# Patient Record
Sex: FEMALE | Race: ASIAN | Employment: FULL TIME
[De-identification: names, ages, dates, MRNs, and addresses within clinical notes are randomized per-mention and may not be internally consistent; named-entity substitution may affect disease eponyms.]

---

## 2017-06-01 ENCOUNTER — RECORDS - HEALTHEAST (OUTPATIENT)
Dept: ADMINISTRATIVE | Facility: OTHER | Age: 27
End: 2017-06-01

## 2017-06-03 ENCOUNTER — HOSPITAL ENCOUNTER (OUTPATIENT)
Dept: ULTRASOUND IMAGING | Facility: CLINIC | Age: 27
Discharge: HOME | End: 2017-06-03

## 2017-06-03 DIAGNOSIS — N97.0 FEMALE INFERTILITY ASSOCIATED WITH ANOVULATION: ICD-10-CM

## 2017-06-19 ENCOUNTER — AMBULATORY - HEALTHEAST (OUTPATIENT)
Dept: LAB | Facility: CLINIC | Age: 27
End: 2017-06-19

## 2017-06-19 DIAGNOSIS — O09.01 SUPERVISION OF PREGNANCY WITH HISTORY OF INFERTILITY IN FIRST TRIMESTER: ICD-10-CM

## 2017-06-21 ENCOUNTER — AMBULATORY - HEALTHEAST (OUTPATIENT)
Dept: LAB | Facility: CLINIC | Age: 27
End: 2017-06-21

## 2017-06-21 DIAGNOSIS — O09.01 SUPERVISION OF PREGNANCY WITH HISTORY OF INFERTILITY IN FIRST TRIMESTER: ICD-10-CM

## 2017-07-08 ENCOUNTER — HEALTH MAINTENANCE LETTER (OUTPATIENT)
Age: 27
End: 2017-07-08

## 2018-02-18 ENCOUNTER — HOSPITAL ENCOUNTER (OUTPATIENT)
Dept: MEDSURG UNIT | Facility: CLINIC | Age: 28
Discharge: HOME | End: 2018-02-18
Attending: OBSTETRICS & GYNECOLOGY | Admitting: OBSTETRICS & GYNECOLOGY

## 2018-02-18 ENCOUNTER — RECORDS - HEALTHEAST (OUTPATIENT)
Dept: ADMINISTRATIVE | Facility: OTHER | Age: 28
End: 2018-02-18

## 2018-02-18 ASSESSMENT — MIFFLIN-ST. JEOR: SCORE: 1305.56

## 2018-02-26 ENCOUNTER — ANESTHESIA - HEALTHEAST (OUTPATIENT)
Dept: OBGYN | Facility: CLINIC | Age: 28
End: 2018-02-26

## 2018-02-28 ENCOUNTER — HOME CARE/HOSPICE - HEALTHEAST (OUTPATIENT)
Dept: HOME HEALTH SERVICES | Facility: HOME HEALTH | Age: 28
End: 2018-02-28

## 2018-03-01 ENCOUNTER — COMMUNICATION - HEALTHEAST (OUTPATIENT)
Dept: OBGYN | Facility: CLINIC | Age: 28
End: 2018-03-01

## 2019-08-25 ENCOUNTER — COMMUNICATION - HEALTHEAST (OUTPATIENT)
Dept: SCHEDULING | Facility: CLINIC | Age: 29
End: 2019-08-25

## 2019-08-25 ENCOUNTER — OFFICE VISIT - HEALTHEAST (OUTPATIENT)
Dept: FAMILY MEDICINE | Facility: CLINIC | Age: 29
End: 2019-08-25

## 2019-08-25 DIAGNOSIS — R22.1 THROAT SWELLING: ICD-10-CM

## 2019-08-25 DIAGNOSIS — J06.9 VIRAL URI WITH COUGH: ICD-10-CM

## 2019-08-25 DIAGNOSIS — J02.9 SORE THROAT: ICD-10-CM

## 2019-08-25 LAB — DEPRECATED S PYO AG THROAT QL EIA: NORMAL

## 2019-08-26 LAB — GROUP A STREP BY PCR: NORMAL

## 2019-11-18 ENCOUNTER — RECORDS - HEALTHEAST (OUTPATIENT)
Dept: ADMINISTRATIVE | Facility: OTHER | Age: 29
End: 2019-11-18

## 2019-11-30 ENCOUNTER — HOSPITAL ENCOUNTER (OUTPATIENT)
Dept: ULTRASOUND IMAGING | Facility: HOSPITAL | Age: 29
Discharge: HOME | End: 2019-11-30

## 2019-11-30 DIAGNOSIS — Z31.41 ENCOUNTER FOR FERTILITY TESTING: ICD-10-CM

## 2019-12-06 ENCOUNTER — TRANSFERRED RECORDS (OUTPATIENT)
Dept: HEALTH INFORMATION MANAGEMENT | Facility: CLINIC | Age: 29
End: 2019-12-06

## 2020-02-27 ENCOUNTER — OFFICE VISIT - HEALTHEAST (OUTPATIENT)
Dept: FAMILY MEDICINE | Facility: CLINIC | Age: 30
End: 2020-02-27

## 2020-02-27 ENCOUNTER — COMMUNICATION - HEALTHEAST (OUTPATIENT)
Dept: FAMILY MEDICINE | Facility: CLINIC | Age: 30
End: 2020-02-27

## 2020-02-27 DIAGNOSIS — N89.8 VAGINAL ODOR: ICD-10-CM

## 2020-02-27 DIAGNOSIS — N89.8 VAGINAL DISCHARGE: ICD-10-CM

## 2020-02-27 DIAGNOSIS — N39.0 URINARY TRACT INFECTION WITHOUT HEMATURIA, SITE UNSPECIFIED: ICD-10-CM

## 2020-02-27 DIAGNOSIS — R30.0 DYSURIA: ICD-10-CM

## 2020-02-27 LAB
ALBUMIN UR-MCNC: ABNORMAL MG/DL
APPEARANCE UR: CLEAR
BACTERIA #/AREA URNS HPF: ABNORMAL HPF
BILIRUB UR QL STRIP: NEGATIVE
CLUE CELLS: NORMAL
COLOR UR AUTO: YELLOW
GLUCOSE UR STRIP-MCNC: ABNORMAL MG/DL
HGB UR QL STRIP: ABNORMAL
KETONES UR STRIP-MCNC: NEGATIVE MG/DL
LEUKOCYTE ESTERASE UR QL STRIP: NEGATIVE
NITRATE UR QL: NEGATIVE
PH UR STRIP: 5.5 [PH] (ref 5–8)
RBC #/AREA URNS AUTO: ABNORMAL HPF
SP GR UR STRIP: >=1.03 (ref 1–1.03)
SQUAMOUS #/AREA URNS AUTO: ABNORMAL LPF
TRICHOMONAS, WET PREP: NORMAL
UROBILINOGEN UR STRIP-ACNC: ABNORMAL
WBC #/AREA URNS AUTO: ABNORMAL HPF
YEAST, WET PREP: NORMAL

## 2020-02-28 LAB
C TRACH DNA SPEC QL PROBE+SIG AMP: NEGATIVE
N GONORRHOEA DNA SPEC QL NAA+PROBE: NEGATIVE

## 2020-02-29 LAB — BACTERIA SPEC CULT: ABNORMAL

## 2020-03-03 ENCOUNTER — COMMUNICATION - HEALTHEAST (OUTPATIENT)
Dept: FAMILY MEDICINE | Facility: CLINIC | Age: 30
End: 2020-03-03

## 2020-05-18 ENCOUNTER — OFFICE VISIT - HEALTHEAST (OUTPATIENT)
Dept: FAMILY MEDICINE | Facility: CLINIC | Age: 30
End: 2020-05-18

## 2020-05-18 DIAGNOSIS — N30.00 ACUTE CYSTITIS WITHOUT HEMATURIA: ICD-10-CM

## 2020-05-18 DIAGNOSIS — R35.0 URINARY FREQUENCY: ICD-10-CM

## 2020-05-18 DIAGNOSIS — Z87.440 HISTORY OF BLADDER INFECTIONS: ICD-10-CM

## 2020-05-18 LAB
ALBUMIN UR-MCNC: NEGATIVE MG/DL
APPEARANCE UR: CLEAR
BACTERIA #/AREA URNS HPF: ABNORMAL HPF
BILIRUB UR QL STRIP: NEGATIVE
COLOR UR AUTO: YELLOW
GLUCOSE UR STRIP-MCNC: NEGATIVE MG/DL
HGB UR QL STRIP: ABNORMAL
KETONES UR STRIP-MCNC: NEGATIVE MG/DL
LEUKOCYTE ESTERASE UR QL STRIP: NEGATIVE
NITRATE UR QL: NEGATIVE
PH UR STRIP: 5.5 [PH] (ref 5–8)
RBC #/AREA URNS AUTO: ABNORMAL HPF
SP GR UR STRIP: 1.02 (ref 1–1.03)
SQUAMOUS #/AREA URNS AUTO: ABNORMAL LPF
UROBILINOGEN UR STRIP-ACNC: ABNORMAL
WBC #/AREA URNS AUTO: ABNORMAL HPF

## 2020-05-19 LAB — BACTERIA SPEC CULT: NORMAL

## 2020-06-19 ENCOUNTER — OFFICE VISIT (OUTPATIENT)
Dept: FAMILY MEDICINE | Facility: CLINIC | Age: 30
End: 2020-06-19
Payer: COMMERCIAL

## 2020-06-19 VITALS
RESPIRATION RATE: 16 BRPM | HEIGHT: 58 IN | DIASTOLIC BLOOD PRESSURE: 89 MMHG | HEART RATE: 77 BPM | TEMPERATURE: 98.5 F | SYSTOLIC BLOOD PRESSURE: 128 MMHG | BODY MASS INDEX: 31.49 KG/M2 | WEIGHT: 150 LBS | OXYGEN SATURATION: 99 %

## 2020-06-19 DIAGNOSIS — E55.9 VITAMIN D DEFICIENCY: ICD-10-CM

## 2020-06-19 DIAGNOSIS — K21.9 GASTROESOPHAGEAL REFLUX DISEASE, ESOPHAGITIS PRESENCE NOT SPECIFIED: ICD-10-CM

## 2020-06-19 DIAGNOSIS — E03.9 HYPOTHYROIDISM, UNSPECIFIED TYPE: Primary | ICD-10-CM

## 2020-06-19 RX ORDER — ERGOCALCIFEROL 1.25 MG/1
CAPSULE, LIQUID FILLED ORAL
COMMUNITY
Start: 2020-06-11 | End: 2020-06-19

## 2020-06-19 RX ORDER — CHOLECALCIFEROL (VITAMIN D3) 50 MCG
1 TABLET ORAL DAILY
Qty: 90 TABLET | Refills: 3 | Status: SHIPPED | OUTPATIENT
Start: 2020-06-19 | End: 2021-07-02

## 2020-06-19 RX ORDER — LEVOTHYROXINE SODIUM 112 MCG
112 TABLET ORAL DAILY
Qty: 90 TABLET | Refills: 3 | Status: SHIPPED | OUTPATIENT
Start: 2020-06-19 | End: 2021-02-12

## 2020-06-19 RX ORDER — ONDANSETRON 4 MG/1
4 TABLET, ORALLY DISINTEGRATING ORAL EVERY 8 HOURS PRN
Qty: 20 TABLET | Refills: 0 | Status: SHIPPED | OUTPATIENT
Start: 2020-06-19 | End: 2020-06-29

## 2020-06-19 RX ORDER — LEVOTHYROXINE SODIUM 112 MCG
TABLET ORAL
COMMUNITY
Start: 2020-06-12 | End: 2020-06-19

## 2020-06-19 ASSESSMENT — MIFFLIN-ST. JEOR: SCORE: 1291.28

## 2020-06-19 NOTE — PROGRESS NOTES
HPI:       Laila Powlel is a 29 year old  female with past medical history significant for hypothyroidism, GERD who presents to establish care.     1. Hypothyroidism: Unsure what type of hypothyroidism she has. Has never had her thyroid ultrasounded or biopsied. Her OBGYN was managing her thyroid replacement medications. She states that her TSH was last checked in December 2019 and was normal.     2. Gastirc reflux: She has had issues with reflux symptoms for the last 7 years. She describes burning upper abdomen/mid-chest pain that is partially relieved with Tums. Notes that her symptoms started after her cholecystectomy in 2013. Negative H pylori test in 2015. She has tried diet modifications (cutting down on spicy foods) with some improvement. Currently taking omeprazole 20 mg once daily.     3. Planned plastic surgery: She is planning on having plastic surgery this fall (breast augmentation, liposuction, tummy tuck). Wants to make sure that she has her health issues controlled (hypothyroidism, hypovitaminosis D) prior to her surgery.     4. Contraception: not currently using birth control however states that she is completely done having children. Concerned about weight gain related to birth control. Not interested in committing to a method of contraception today.            History:     Patient Active Problem List   Diagnosis     Health Care Home     Microscopic hematuria     Hypothyroidism     Gastroesophageal reflux disease, esophagitis presence not specified     Vitamin D deficiency     Current Outpatient Medications   Medication Sig Dispense Refill     omeprazole (PRILOSEC) 20 MG DR capsule Take 1 capsule (20 mg) by mouth 2 times daily 180 capsule 3     ondansetron (ZOFRAN-ODT) 4 MG ODT tab Take 1 tablet (4 mg) by mouth every 8 hours as needed for nausea 20 tablet 0     SYNTHROID 112 MCG tablet Take 1 tablet (112 mcg) by mouth daily 90 tablet 3     vitamin D3 (CHOLECALCIFEROL) 50 mcg (2000 units)  "tablet Take 1 tablet (50 mcg) by mouth daily 90 tablet 3     oxyCODONE-acetaminophen (PERCOCET) 5-325 MG per tablet Take 1 tablet by mouth every 4 hours as needed       Social History     Social History Narrative    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       Allergies   Allergen Reactions     Levothyroxine Swelling     Morphine      Nausea          Review of Systems:     10 point ROS neg other than the symptoms noted above in the HPI.          Physical Exam:     Vitals:    06/19/20 0816   BP: 128/89   Pulse: 77   Resp: 16   Temp: 98.5  F (36.9  C)   TempSrc: Oral   SpO2: 99%   Weight: 68 kg (150 lb)   Height: 1.475 m (4' 10.07\")     Body mass index is 31.27 kg/m .    GENERAL APPEARANCE: healthy, alert and no distress,  EYES: Eyes grossly normal to inspection  HENT: nose and mouth without ulcers or lesions  NECK: no adenopathy, no asymmetry, masses, or scars and thyroid normal to palpation without enlargement or palpable nodules  RESP: lungs clear to auscultation - no rales, rhonchi or wheezes  CV: regular rate and rhythm,  and no murmur, click,  rub or gallop  ABDOMEN: soft, nontender, without hepatosplenomegaly or masses  MS: extremities normal- no gross deformities noted  SKIN: no suspicious lesions or rashes  NEURO: Normal strength and tone, sensory exam grossly normal, mentation appears intact and speech normal  PSYCH: mood and affect normal/bright         Assessment and Plan:     Patient is a 29 year old female seen for:    1. Hypothyroidism, unspecified type  Will refill her prescription today. Patient signed NATALIYA for her previous clinic -- I'd like to review the studies they have previously done related to her hypothyroidism and then have patient return for necessary monitoring labs.  - SYNTHROID 112 MCG tablet; Take 1 tablet (112 mcg) by mouth daily  Dispense: 90 tablet; Refill: 3    2. Gastroesophageal reflux disease, esophagitis presence not " specified  Will increase omeprazole to BID to see if that helps better manage her symptoms. Should patient not see improvement, would consider referral to GI for endoscopy. Refill of PRN Zofran requested.   - omeprazole (PRILOSEC) 20 MG DR capsule; Take 1 capsule (20 mg) by mouth 2 times daily  Dispense: 180 capsule; Refill: 3  - ondansetron (ZOFRAN-ODT) 4 MG ODT tab; Take 1 tablet (4 mg) by mouth every 8 hours as needed for nausea  Dispense: 20 tablet; Refill: 0    3. Vitamin D deficiency  Refill requested.   - vitamin D3 (CHOLECALCIFEROL) 50 mcg (2000 units) tablet; Take 1 tablet (50 mcg) by mouth daily  Dispense: 90 tablet; Refill: 3    Options for treatment and follow-up care were reviewed with the patient and/or guardian. Lailavladislav Powell and/or guardian engaged in the decision making process and verbalized understanding of the options discussed and agreed with the final plan.      Noemi Adorno MD  [facility] Resident      Precepted with: Rod Bui MD

## 2020-06-19 NOTE — NURSING NOTE
"Chief Complaint   Patient presents with     Establish Care     re establish care. No concerns today. Will be having surgery in October.      Medication Reconciliation     completed. Omeprazole 20 mg caspule daily-  would like a refill.        /89   Pulse 77   Temp 98.5  F (36.9  C) (Oral)   Resp 16   Ht 1.475 m (4' 10.07\")   Wt 68 kg (150 lb)   LMP 04/01/2020   SpO2 99%   Breastfeeding Unknown   BMI 31.27 kg/m        ~ Rush Monzon CMA ([place])  [facility]  Phone: [phone]    "

## 2020-06-22 NOTE — PROGRESS NOTES
Preceptor Attestation:   Patient seen, evaluated and discussed with the resident. I have verified the content of the note, which accurately reflects my assessment of the patient and the plan of care.    Supervising Physician:Rod Bui MD    [facility]

## 2020-06-23 ENCOUNTER — TELEPHONE (OUTPATIENT)
Dept: FAMILY MEDICINE | Facility: CLINIC | Age: 30
End: 2020-06-23

## 2020-06-23 NOTE — TELEPHONE ENCOUNTER
Prior Authorization needed on:  6/23/20    Medication:  OMEPRAZOLE Dose:  20MG CAPSULES    Pharmacy confirmed as   [id number] - [place], [place] - [address] [place] [address]  Phone: [phone] Fax: [phone]  : Yes    Insurance Name:  [employer]  Insurance Phone: [phone]  Insurance Patient ID: [id number]    Alternatives Suggested:  MAX OF 1 CAP DAILY    SUSAN IMX CMA June 23, 2020 at 9:11 AM

## 2020-06-24 NOTE — TELEPHONE ENCOUNTER
Prior Authorization Approval    Authorization Effective Date: 3/24/2020  Authorization Expiration Date: 10/22/2020  Medication: OMEPRAZOLE 20MG CAP- APPROVED   Approved Dose/Quantity:   Reference #:     Insurance Company: [employer] - Phone [phone] Fax [phone]  Expected CoPay:       CoPay Card Available:      Foundation Assistance Needed:    Which Pharmacy is filling the prescription (Not needed for infusion/clinic administered): [id number] - [place], [place] - [address] AT [facility]  Pharmacy Notified: Yes  Patient Notified: Comment:  **Instructed pharmacy to notify patient when script is ready to /ship.**

## 2020-06-24 NOTE — TELEPHONE ENCOUNTER
Patient with GERD that is refractory to once daily PPI. Part of the standard of care would be a two month trial of twice daily PPI. Will pursue prior authorization for twice daily PPI.    Noemi Adorno MD

## 2020-06-25 NOTE — PROGRESS NOTES
"       HPI:       Liala Powell is a 29 year old  female with a significant past medical history of GERD, hypothyroidism who presents for an administrative encounter to fill out [facility] paperwork. Patient is heading to [place] on 10/4/2020 for her \"mommy makeover\" (diastatsis recti repair, liposuction, tummy tuck, breast augmentation). The surgeon's name is Dr. Mcnair. She is planning on taking 6 weeks for recovery.          History:     Patient Active Problem List   Diagnosis     Health Care Home     Microscopic hematuria     Hypothyroidism     Gastroesophageal reflux disease, esophagitis presence not specified     Vitamin D deficiency     Current Outpatient Medications   Medication Sig Dispense Refill     omeprazole (PRILOSEC) 20 MG DR capsule Take 1 capsule (20 mg) by mouth 2 times daily 180 capsule 3     ondansetron (ZOFRAN-ODT) 4 MG ODT tab Take 1 tablet (4 mg) by mouth every 8 hours as needed for nausea 20 tablet 0     SYNTHROID 112 MCG tablet Take 1 tablet (112 mcg) by mouth daily 90 tablet 3     vitamin D3 (CHOLECALCIFEROL) 50 mcg (2000 units) tablet Take 1 tablet (50 mcg) by mouth daily 90 tablet 3     oxyCODONE-acetaminophen (PERCOCET) 5-325 MG per tablet Take 1 tablet by mouth every 4 hours as needed         Social History     Social History Narrative    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       Allergies   Allergen Reactions     Levothyroxine Swelling     Morphine      Nausea          Review of Systems:     Constitutional, Resp, CV, GI, Psych, Neuro ROS neg other than the symptoms noted above in the HPI.          Physical Exam:     Vitals:    06/26/20 1013   BP: 115/79   Pulse: 77   Resp: 20   Temp: 98.3  F (36.8  C)   TempSrc: Oral   SpO2: 98%   Weight: 68.2 kg (150 lb 6.4 oz)   Height: 1.48 m (4' 10.27\")     Body mass index is 31.15 kg/m .     GENERAL APPEARANCE: healthy, alert and no distress,  EYES: Eyes grossly normal to inspection  RESP: " normal work of breathing on room air, no use of accessory muscles  MS: extremities normal- no gross deformities noted  SKIN: no suspicious lesions or rashes on exposed skin  NEURO: mentation appears intact and speech normal  PSYCH: mood and affect normal/bright         Assessment and Plan:     Patient is a 29 year old female seen for:    1. Administrative encounter  FMLA paperwork completed and work note provided. Will scan a copy into the chart. Discussed that patient will need a pre-op visit within 30 days prior to her procedure and that her employer may require an additional visit to evaluate her workability before returning after leave.     Options for treatment and follow-up care were reviewed with the patient and/or guardian. Laila Powell and/or guardian engaged in the decision making process and verbalized understanding of the options discussed and agreed with the final plan.      Noemi Adorno MD  [facility] Medicine Resident      Precepted with: Brian Lundberg MD

## 2020-06-26 ENCOUNTER — OFFICE VISIT (OUTPATIENT)
Dept: FAMILY MEDICINE | Facility: CLINIC | Age: 30
End: 2020-06-26
Payer: COMMERCIAL

## 2020-06-26 VITALS
DIASTOLIC BLOOD PRESSURE: 79 MMHG | RESPIRATION RATE: 20 BRPM | OXYGEN SATURATION: 98 % | HEIGHT: 58 IN | SYSTOLIC BLOOD PRESSURE: 115 MMHG | TEMPERATURE: 98.3 F | WEIGHT: 150.4 LBS | HEART RATE: 77 BPM | BODY MASS INDEX: 31.57 KG/M2

## 2020-06-26 DIAGNOSIS — Z02.9 ADMINISTRATIVE ENCOUNTER: Primary | ICD-10-CM

## 2020-06-26 ASSESSMENT — MIFFLIN-ST. JEOR: SCORE: 1296.21

## 2020-06-26 NOTE — LETTER
6/26/2020    Re: Laila Powell  1990      To Whom It May Concern:     Laila Powell was seen in clinic today.  She has a planned elective surgery on 10/6/2020. She is requesting 6 weeks of time for post-surgical recovery. She would be able to return to work on 11/16/2020.             Noemi Adorno MD  6/26/2020 10:58 AM

## 2020-06-29 PROBLEM — E66.811 CLASS 1 OBESITY DUE TO EXCESS CALORIES WITHOUT SERIOUS COMORBIDITY WITH BODY MASS INDEX (BMI) OF 31.0 TO 31.9 IN ADULT: Status: ACTIVE | Noted: 2020-06-29

## 2020-06-29 PROBLEM — E66.09 CLASS 1 OBESITY DUE TO EXCESS CALORIES WITHOUT SERIOUS COMORBIDITY WITH BODY MASS INDEX (BMI) OF 31.0 TO 31.9 IN ADULT: Status: ACTIVE | Noted: 2020-06-29

## 2020-06-29 NOTE — PROGRESS NOTES
I have personally reviewed the history and examination as documented by Dr. Prescott.  I was present during key portions of the visit and agree with the assessment and plan as documented for 29 yr old female here for [facility] paperwork for time off after elective surgery. Form filled.    Brian Lunbderg MD  June 29, 2020  4:14 PM

## 2020-09-10 ENCOUNTER — OFFICE VISIT (OUTPATIENT)
Dept: FAMILY MEDICINE | Facility: CLINIC | Age: 30
End: 2020-09-10
Payer: COMMERCIAL

## 2020-09-10 ENCOUNTER — MYC REFILL (OUTPATIENT)
Dept: FAMILY MEDICINE | Facility: CLINIC | Age: 30
End: 2020-09-10

## 2020-09-10 VITALS
TEMPERATURE: 98.5 F | HEART RATE: 88 BPM | DIASTOLIC BLOOD PRESSURE: 77 MMHG | HEIGHT: 58 IN | SYSTOLIC BLOOD PRESSURE: 110 MMHG | WEIGHT: 147.8 LBS | BODY MASS INDEX: 31.02 KG/M2 | OXYGEN SATURATION: 99 %

## 2020-09-10 DIAGNOSIS — E03.9 HYPOTHYROIDISM, UNSPECIFIED TYPE: ICD-10-CM

## 2020-09-10 DIAGNOSIS — K21.9 GASTROESOPHAGEAL REFLUX DISEASE, ESOPHAGITIS PRESENCE NOT SPECIFIED: ICD-10-CM

## 2020-09-10 DIAGNOSIS — Z01.818 PREOP GENERAL PHYSICAL EXAM: Primary | ICD-10-CM

## 2020-09-10 LAB
% GRANULOCYTES: 58.7 %G (ref 40–75)
ALBUMIN SERPL BCP-MCNC: 4.3 G/DL (ref 3.5–5)
ALP SERPL-CCNC: 55 U/L (ref 45–120)
ALT SERPL W/O P-5'-P-CCNC: 44 U/L (ref 0–45)
ANION GAP SERPL CALCULATED.3IONS-SCNC: 11 MMOL/L (ref 5–18)
APTT PPP: 29 SECONDS (ref 24–37)
AST SERPL-CCNC: 40 U/L (ref 0–40)
B-HCG SERPL-ACNC: <2 MLU/ML (ref 0–4)
BILIRUB SERPL-MCNC: 0.4 MG/DL (ref 0–1)
BUN SERPL-MCNC: 13 MG/DL (ref 8–22)
CALCIUM SERPL-MCNC: 9.7 MG/DL (ref 8.5–10.5)
CHLORIDE SERPL-SCNC: 105 MMOL/L (ref 98–107)
CO2 SERPL-SCNC: 23 MMOL/L (ref 22–31)
CREAT SERPL-MCNC: 0.63 MG/DL (ref 0.6–1.1)
GLUCOSE SERPL-MCNC: 79 MG/DL (ref 70–125)
GRANULOCYTES #: 3 K/UL (ref 1.6–8.3)
HCT VFR BLD AUTO: 45.9 % (ref 35–47)
HEMOGLOBIN: 14 G/DL (ref 11.7–15.7)
INR PPP: 0.93 (ref 0.9–1.1)
LYMPHOCYTES # BLD AUTO: 1.8 K/UL (ref 0.8–5.3)
LYMPHOCYTES NFR BLD AUTO: 34.6 %L (ref 20–48)
MCH RBC QN AUTO: 29 PG (ref 26.5–35)
MCHC RBC AUTO-ENTMCNC: 30.5 G/DL (ref 32–36)
MCV RBC AUTO: 95 FL (ref 78–100)
MID #: 0.3 K/UL (ref 0–2.2)
MID %: 6.7 %M (ref 0–20)
PLATELET # BLD AUTO: 343 K/UL (ref 150–450)
POTASSIUM SERPL-SCNC: 4 MMOL/L (ref 3.5–5)
PROT SERPL-MCNC: 7.9 G/DL (ref 6–8)
RBC # BLD AUTO: 4.83 M/UL (ref 3.8–5.2)
SODIUM SERPL-SCNC: 139 MMOL/L (ref 136–145)
TSH SERPL DL<=0.05 MIU/L-ACNC: 0.64 UIU/ML (ref 0.3–5)
WBC # BLD AUTO: 5.1 K/UL (ref 4–11)

## 2020-09-10 RX ORDER — ONDANSETRON 4 MG/1
4 TABLET, ORALLY DISINTEGRATING ORAL EVERY 12 HOURS PRN
Qty: 30 TABLET | Refills: 0 | OUTPATIENT
Start: 2020-09-10

## 2020-09-10 RX ORDER — ONDANSETRON 4 MG/1
4 TABLET, ORALLY DISINTEGRATING ORAL EVERY 8 HOURS PRN
Qty: 30 TABLET | Refills: 3 | Status: SHIPPED | OUTPATIENT
Start: 2020-09-10

## 2020-09-10 RX ORDER — MEDROXYPROGESTERONE ACETATE 10 MG
TABLET ORAL
COMMUNITY
Start: 2020-06-25 | End: 2021-02-12

## 2020-09-10 ASSESSMENT — MIFFLIN-ST. JEOR: SCORE: 1284.42

## 2020-09-10 NOTE — TELEPHONE ENCOUNTER
Yes, noted at 1132am Dr Velez had filled rx. Will call [facility] to ensure they have received rx. Alla RN

## 2020-09-10 NOTE — PROGRESS NOTES
[facility] [address]  [place] [address]  Phone: [phone]  Fax: [phone]  Primary Provider: Noemi Adorno  Pre-op Performing Provider: TRINIDAD COOK    PREOPERATIVE EVALUATION:  Today's date: 9/10/2020    Laila Powell is a 29 year old female who presents for a preoperative evaluation.    Surgical Information:  Surgery Details 9/10/2020   Surgery/Procedure: Tummy tuck, breast   Surgery Location:  Cosmetic   Surgeon: Dr. Mcnair   Surgery Date: 10/6/20   Time of Surgery: unknown   Where patient plans to recover: At home with family   Additional recovery plan details: N/A     Fax number for surgical facility: [phone]  Type of Anesthesia Anticipated: General    Subjective     HPI related to upcoming procedure: 10/6/2020. Breast augmentation and tummy tuck. Plan to have a drain in place after the procedure.   Preop Questions 9/10/2020   1. Have you ever had a heart attack or stroke? No   2. Have you ever had surgery on your heart or blood vessels, such as a stent placement, a coronary artery bypass, or surgery on an artery in your head, neck, heart, or legs? No   3. Do you have chest pain with activity? No   4. Do you have a history of  heart failure? No   5. Do you currently have a cold, bronchitis or symptoms of other infection? No   6. Do you have a cough, shortness of breath, or wheezing? No   7. Do you or anyone in your family have previous history of blood clots? No   8. Do you or does anyone in your family have a serious bleeding problem such as prolonged bleeding following surgeries or cuts? No   9. Have you ever had problems with anemia or been told to take iron pills? YES - After pregnancy   10. Have you had any abnormal blood loss such as black, tarry or bloody stools, or abnormal vaginal bleeding? No   11. Have you ever had a blood transfusion? YES - After  in    11a. Have you ever had a transfusion reaction? No   Are you willing to have a blood transfusion  if it is medically needed before, during, or after your surgery? Yes   13. Have you or any of your relatives ever had problems with anesthesia? No   14. Do you have sleep apnea, excessive snoring or daytime drowsiness? No   15. Do you have any artifical heart valves or other implanted medical devices like a pacemaker, defibrillator, or continuous glucose monitor? No   16. Do you have artificial joints? No   17. Are you allergic to latex? No   18. Is there any chance that you may be pregnant? No     Patient does not have a Health Care Directive or Living Will: Discussed advance care planning with patient; however, patient declined at this time.        Review of Systems  CONSTITUTIONAL: NEGATIVE for fever, chills, change in weight  INTEGUMENTARY/SKIN: NEGATIVE for worrisome rashes, moles or lesions  EYES: NEGATIVE for vision changes or irritation  ENT/MOUTH: NEGATIVE for ear, mouth and throat problems  RESP: NEGATIVE for significant cough or SOB  BREAST: NEGATIVE for masses, tenderness or discharge  CV: NEGATIVE for chest pain, palpitations or peripheral edema  GI: NEGATIVE for nausea, abdominal pain, heartburn, or change in bowel habits  : NEGATIVE for frequency, dysuria, or hematuria  MUSCULOSKELETAL: NEGATIVE for significant arthralgias or myalgia  NEURO: NEGATIVE for weakness, dizziness or paresthesias  ENDOCRINE: NEGATIVE for temperature intolerance, skin/hair changes  HEME: NEGATIVE for bleeding problems  PSYCHIATRIC: NEGATIVE for changes in mood or affect    Patient Active Problem List    Diagnosis Date Noted     Class 1 obesity due to excess calories without serious comorbidity with body mass index (BMI) of 31.0 to 31.9 in adult 06/29/2020     Priority: Medium     Gastroesophageal reflux disease, esophagitis presence not specified 06/19/2020     Priority: Medium     Vitamin D deficiency 06/19/2020     Priority: Medium     Hypothyroidism 11/04/2013     Priority: Medium     Health Care Home 01/08/2013      "Priority: Medium     Tier Level: 0    DX V65.8 REPLACED WITH 06236 HEALTH CARE HOME (04/08/2013)       Microscopic hematuria 01/08/2013     Priority: Medium      Past Medical History:   Diagnosis Date     Anemia 11/4/2013     Headache      Hypothyroidism 11/4/2013     Reflux gastritis      Past Surgical History:   Procedure Laterality Date     CHOLECYSTECTOMY  2013    [facility]     Current Outpatient Medications   Medication Sig Dispense Refill     ondansetron (ZOFRAN-ODT) 4 MG ODT tab Take 1 tablet (4 mg) by mouth every 8 hours as needed for nausea 30 tablet 3     medroxyPROGESTERone (PROVERA) 10 MG tablet        omeprazole (PRILOSEC) 20 MG DR capsule Take 1 capsule (20 mg) by mouth 2 times daily 180 capsule 3     ondansetron (ZOFRAN-ODT) 4 MG ODT tab Take 1 tablet (4 mg) by mouth every 12 hours as needed for nausea 30 tablet 0     SYNTHROID 112 MCG tablet Take 1 tablet (112 mcg) by mouth daily 90 tablet 3     vitamin D3 (CHOLECALCIFEROL) 50 mcg (2000 units) tablet Take 1 tablet (50 mcg) by mouth daily 90 tablet 3       Allergies   Allergen Reactions     Levothyroxine Swelling     Morphine      Nausea        Social History     Tobacco Use     Smoking status: Passive Smoke Exposure - Never Smoker     Smokeless tobacco: Never Used     Tobacco comment:  smokes   Substance Use Topics     Alcohol use: No     Alcohol/week: 0.0 standard drinks     Family History   Problem Relation Age of Onset     Diabetes No family hx of      Coronary Artery Disease No family hx of      Hypertension No family hx of      Breast Cancer No family hx of      Colon Cancer No family hx of      Prostate Cancer No family hx of      Other Cancer No family hx of      History   Drug Use No            Objective   /77   Pulse 88   Temp 98.5  F (36.9  C) (Oral)   Ht 1.48 m (4' 10.27\")   Wt 67 kg (147 lb 12.8 oz)   SpO2 99%   BMI 30.61 kg/m    Physical Exam  GENERAL APPEARANCE: healthy, alert and no " "distress  HENT: ear canals and TM's normal and nose and mouth without ulcers or lesions  RESP: lungs clear to auscultation - no rales, rhonchi or wheezes  CV: regular rate and rhythm, normal S1 S2, no S3 or S4 and no murmur, click or rub   ABDOMEN: soft, nontender, no HSM or masses and bowel sounds normal  NEURO: Normal strength and tone, sensory exam grossly normal, mentation intact and speech normal      PRE-OP Diagnostics:  Labs pending at this time.  Results will be reviewed when available.  EKG required for surgeon request. and not completed in the last 90 days.        EKG Interpretation:      Interpreted by Lyndon Velez MD  Time reviewed:9/10/2020   Symptoms at time of EKG: None   Rhythm: Normal sinus   Rate: Normal  Axis: Normal  Ectopy: None  Conduction: Normal  ST Segments/ T Waves: Non-specific ST-T wave changes and T wave inversion III and V1  Q Waves: None  Comparison to prior: No old EKG available    Clinical Impression: normal EKG         Assessment & Plan   The proposed surgical procedure is considered INTERMEDIATE risk.    REVISED CARDIAC RISK INDEX  The patient has the following serious cardiovascular risks for perioperative complications:  No serious cardiac risks = 0 points    INTERPRETATION: 0 points: Class I (very low risk - 0.4% complication rate)       1. Preop general physical exam  Patient low risk for proposed procedure. No history of issues with anesthesia. History of anemia with her pregnancy that did require transfusion so will check hemoglobin today.   - CBC with Diff Plt ([facility])  - Thyroid Rice ([facility])  - Beta-HCG Quantitative ([facility])  - Comprehensive Metabolic Panel ([facility]) - results <1hr Protocol  - APTT ([facility])  - EKG 12-lead complete w/read - Clinics  - INR ([facility])    2. Hypothyroidism, unspecified type  Previously well controlled but patient states it has been \"awhile\" since TSH last check. Will check TSH and T4 if TSH abnormal.  -TSH    3. " Gastroesophageal reflux disease, esophagitis presence not specified  -Prilosec PRN  - ondansetron (ZOFRAN-ODT) 4 MG ODT tab; Take 1 tablet (4 mg) by mouth every 8 hours as needed for nausea  Dispense: 30 tablet; Refill: 3      The patient has the following additional risks and recommendations for perioperative complications:     - No identified additional risk factors other than previously addressed     MEDICATION INSTRUCTIONS:  Patient is to take all scheduled medications on the day of surgery    RECOMMENDATION:  APPROVAL GIVEN to proceed with proposed procedure, without further diagnostic evaluation.    No follow-ups on file.    Signed Electronically by: Lyndon Velez MD    Copy of this evaluation report is provided to requesting physician.

## 2020-09-10 NOTE — TELEPHONE ENCOUNTER
Giovanny has received rx and filled it for quantity insurance allow for coverage= 21 tablets in 26 days period. Pharmacy to make note and let patient know this information. Alla FRIAS

## 2020-09-10 NOTE — TELEPHONE ENCOUNTER
Reviewed refill request. Looks like this medication was refilled during an office encounter this morning by my partner, Dr. Velez. Please call and inform pharmacy.    Noemi Adorno MD

## 2020-09-10 NOTE — PROGRESS NOTES
[facility] [address]  [place] [address]  Phone: [phone]  Fax: [phone]  Primary Provider: Noemi Adorno  {FV AMB Performing Provider (Optional):792191}    PREOPERATIVE EVALUATION:  Today's date: 9/10/2020    Laila Powell is a 29 year old female who presents for a preoperative evaluation.    Surgical Information:  Surgery Details 9/10/2020   Surgery/Procedure: Tummy tuck, breast   Surgery Location:  Cosmetic   Surgeon: Dr. Mcnair   Surgery Date: 10/6/20   Time of Surgery: unknown   Where patient plans to recover: At home with family   Additional recovery plan details: N/A     Fax number for surgical facility: {SURGERY FAX NUMBER:[id number]}  Type of Anesthesia Anticipated: {ANESTHESIA:651588}    Subjective     HPI related to upcoming procedure: ***  Preop Questions 9/10/2020   1. Have you ever had a heart attack or stroke? No   2. Have you ever had surgery on your heart or blood vessels, such as a stent placement, a coronary artery bypass, or surgery on an artery in your head, neck, heart, or legs? No   3. Do you have chest pain with activity? No   4. Do you have a history of  heart failure? No   5. Do you currently have a cold, bronchitis or symptoms of other infection? No   6. Do you have a cough, shortness of breath, or wheezing? No   7. Do you or anyone in your family have previous history of blood clots? No   8. Do you or does anyone in your family have a serious bleeding problem such as prolonged bleeding following surgeries or cuts? No   9. Have you ever had problems with anemia or been told to take iron pills? YES - ***   10. Have you had any abnormal blood loss such as black, tarry or bloody stools, or abnormal vaginal bleeding? No   11. Have you ever had a blood transfusion? YES - ***   11a. Have you ever had a transfusion reaction? No   Are you willing to have a blood transfusion if it is medically needed before, during, or after your surgery? Yes   13. Have you or any  of your relatives ever had problems with anesthesia? No   14. Do you have sleep apnea, excessive snoring or daytime drowsiness? No   15. Do you have any artifical heart valves or other implanted medical devices like a pacemaker, defibrillator, or continuous glucose monitor? No   16. Do you have artificial joints? No   17. Are you allergic to latex? No   18. Is there any chance that you may be pregnant? No     Patient does not have a Health Care Directive or Living Will: {ADVANCE_DIRECTIVE_STATUS:316390}    RX monitoring program (MNPMP) reviewed: {Mnpmpreport:697164}  {Review MNPMP for all patients per [employer] [email or web address]}  {Chronic problem details (Optional):559105}    Review of Systems  {ROS Preop Choices:640753}    Patient Active Problem List    Diagnosis Date Noted     Class 1 obesity due to excess calories without serious comorbidity with body mass index (BMI) of 31.0 to 31.9 in adult 06/29/2020     Priority: Medium     Gastroesophageal reflux disease, esophagitis presence not specified 06/19/2020     Priority: Medium     Vitamin D deficiency 06/19/2020     Priority: Medium     Hypothyroidism 11/04/2013     Priority: Medium     Health Care Home 01/08/2013     Priority: Medium     Tier Level: 0    DX V65.8 REPLACED WITH 45264 HEALTH CARE HOME (04/08/2013)       Microscopic hematuria 01/08/2013     Priority: Medium      Past Medical History:   Diagnosis Date     Anemia 11/4/2013     Headache      Hypothyroidism 11/4/2013     Reflux gastritis      Past Surgical History:   Procedure Laterality Date     CHOLECYSTECTOMY  2013    On license of [facility]     Current Outpatient Medications   Medication Sig Dispense Refill     omeprazole (PRILOSEC) 20 MG DR capsule Take 1 capsule (20 mg) by mouth 2 times daily 180 capsule 3     ondansetron (ZOFRAN-ODT) 4 MG ODT tab Take 1 tablet (4 mg) by mouth every 12 hours as needed for nausea 30 tablet 0     oxyCODONE-acetaminophen (PERCOCET) 5-325  "MG per tablet Take 1 tablet by mouth every 4 hours as needed       SYNTHROID 112 MCG tablet Take 1 tablet (112 mcg) by mouth daily 90 tablet 3     vitamin D3 (CHOLECALCIFEROL) 50 mcg (2000 units) tablet Take 1 tablet (50 mcg) by mouth daily 90 tablet 3       Allergies   Allergen Reactions     Levothyroxine Swelling     Morphine      Nausea        Social History     Tobacco Use     Smoking status: Passive Smoke Exposure - Never Smoker     Smokeless tobacco: Never Used     Tobacco comment:  smokes   Substance Use Topics     Alcohol use: No     Alcohol/week: 0.0 standard drinks     {FAMILY HISTORY (Optional):266521307}  History   Drug Use No            Objective   /77   Pulse 88   Temp 98.5  F (36.9  C) (Oral)   Ht 1.48 m (4' 10.27\")   Wt 67 kg (147 lb 12.8 oz)   SpO2 99%   BMI 30.61 kg/m    Physical Exam  {EXAM Preop Choices:435626}    No results for input(s): HGB, PLT, INR, NA, POTASSIUM, CR, A1C in the last 59423 hours.     PRE-OP Diagnostics:  {LABS:159947}  {EK}    {Provider  Link to PREOP SmartSet :070226}     Assessment & Plan   The proposed surgical procedure is considered {HIGH=major cardiovascular or procedures requiring prolonged anesthesia >4 hours or large fluid shifts;    INTERMEDIATE=abdominal, most orthopedic and intrathoracic surgery; LOW= endoscopy, cataract and breast surgery:331765} risk.    REVISED CARDIAC RISK INDEX  The patient has the following serious cardiovascular risks for perioperative complications:  {PREOP REVISED CARDIAC RISK INDEX (RCRI):661960::\"No serious cardiac risks = 0 points\"}    INTERPRETATION: {REVISED CARDIAC RISK INTERPRETATION:805894}    {Diag Picklist :020819}    The patient has the following additional risks and recommendations for perioperative complications:    {IMPORTANT - Conditions - complete carefully!!:450896}     MEDICATION INSTRUCTIONS:  {IMPORTANT - Medications:521558}    RECOMMENDATION:  {IMPORTANT - Approval:697542::\"APPROVAL GIVEN " "to proceed with proposed procedure, without further diagnostic evaluation.\"}    No follow-ups on file.    Signed Electronically by: Lyndon Velez MD    Copy of this evaluation report is provided to requesting physician.    [facility] Guidelines    Revised Cardiac Risk Index  "

## 2020-09-10 NOTE — PATIENT INSTRUCTIONS

## 2020-09-15 ENCOUNTER — MYC MEDICAL ADVICE (OUTPATIENT)
Dept: FAMILY MEDICINE | Facility: CLINIC | Age: 30
End: 2020-09-15

## 2020-09-22 ENCOUNTER — OFFICE VISIT (OUTPATIENT)
Dept: FAMILY MEDICINE | Facility: CLINIC | Age: 30
End: 2020-09-22
Payer: COMMERCIAL

## 2020-09-22 VITALS
BODY MASS INDEX: 30.86 KG/M2 | DIASTOLIC BLOOD PRESSURE: 74 MMHG | TEMPERATURE: 98.5 F | WEIGHT: 147 LBS | OXYGEN SATURATION: 98 % | HEART RATE: 78 BPM | HEIGHT: 58 IN | SYSTOLIC BLOOD PRESSURE: 109 MMHG | RESPIRATION RATE: 20 BRPM

## 2020-09-22 DIAGNOSIS — Z01.818 PRE-OP EXAM: Primary | ICD-10-CM

## 2020-09-22 ASSESSMENT — MIFFLIN-ST. JEOR: SCORE: 1278.29

## 2020-09-22 NOTE — NURSING NOTE
"Chief Complaint   Patient presents with     Follow Up     EKG repeat per surgeon.      Medication Reconciliation     completed.        /74 (BP Location: Right arm, Patient Position: Sitting, Cuff Size: Adult Regular)   Pulse 78   Temp 98.5  F (36.9  C) (Oral)   Resp 20   Ht 1.476 m (4' 10.11\")   Wt 66.7 kg (147 lb)   SpO2 98%   BMI 30.61 kg/m      BP Recheck if applicable: NA      ~ Rush Monzon CMA ([place])  [facility]  Phone: [phone]    "

## 2020-09-22 NOTE — PROGRESS NOTES
Preceptor Attestation:   Patient seen, evaluated and discussed with the resident. I personally viewed the EKG and agree with the interpretation documented by the resident. I have verified the content of the note, which accurately reflects my assessment of the patient and the plan of care.  Supervising Physician:Alma Corcoran MD  [facility]

## 2020-09-22 NOTE — PROGRESS NOTES
"  CHIEF COMPLAINT                                                      Chief Complaint   Patient presents with     Follow Up     EKG repeat per surgeon.      Medication Reconciliation     completed.      SUBJECTIVE:                                                    Laila Powell is a 29 year old year old female who presents to clinic today for the following health issues:    Pre-op follow up:  -Was seen by myself on 9/10/2020 for pre-op  -EKG at that time had \"minor inferior depolarization disturbance, consider ischemia\" from computer read. Otherwise unremarkable. Had a wandering baseline so difficult to interpret. Surgeon is requesting a repeat  -Denies history of exertional chest pain  -No lightheadedness or dizziness with exertion  -No family history of early cardiac disease    Patient is an established patient of our clinic.   ----------------------------------------------------------------------------------------------------------------------  Patient Active Problem List   Diagnosis     Health Care Home     Microscopic hematuria     Hypothyroidism     Gastroesophageal reflux disease, esophagitis presence not specified     Vitamin D deficiency     Class 1 obesity due to excess calories without serious comorbidity with body mass index (BMI) of 31.0 to 31.9 in adult     Past Surgical History:   Procedure Laterality Date     CHOLECYSTECTOMY  2013    [facility]       Social History     Tobacco Use     Smoking status: Passive Smoke Exposure - Never Smoker     Smokeless tobacco: Never Used     Tobacco comment:  smokes   Substance Use Topics     Alcohol use: No     Alcohol/week: 0.0 standard drinks     Family History   Problem Relation Age of Onset     Diabetes No family hx of      Coronary Artery Disease No family hx of      Hypertension No family hx of      Breast Cancer No family hx of      Colon Cancer No family hx of      Prostate Cancer No family hx of      Other Cancer No family hx of " "         Problem list and past medical, surgical, social, and family histories reviewed & adjusted, as indicated.    Current Outpatient Medications   Medication Sig Dispense Refill     omeprazole (PRILOSEC) 20 MG DR capsule Take 1 capsule (20 mg) by mouth 2 times daily 180 capsule 3     ondansetron (ZOFRAN-ODT) 4 MG ODT tab Take 1 tablet (4 mg) by mouth every 8 hours as needed for nausea 30 tablet 3     SYNTHROID 112 MCG tablet Take 1 tablet (112 mcg) by mouth daily 90 tablet 3     vitamin D3 (CHOLECALCIFEROL) 50 mcg (2000 units) tablet Take 1 tablet (50 mcg) by mouth daily 90 tablet 3     medroxyPROGESTERone (PROVERA) 10 MG tablet        ondansetron (ZOFRAN-ODT) 4 MG ODT tab Take 1 tablet (4 mg) by mouth every 12 hours as needed for nausea (Patient not taking: Reported on 9/22/2020) 30 tablet 0     Medication list reviewed and updated as indicated.    Allergies   Allergen Reactions     Levothyroxine Swelling     Morphine      Nausea     Allergies reviewed and updated as indicated.  ----------------------------------------------------------------------------------------------------------------------  ROS:     ROS: 10 point ROS neg other than the symptoms noted above in the HPI.    OBJECTIVE:     /74 (BP Location: Right arm, Patient Position: Sitting, Cuff Size: Adult Regular)   Pulse 78   Temp 98.5  F (36.9  C) (Oral)   Resp 20   Ht 1.476 m (4' 10.11\")   Wt 66.7 kg (147 lb)   SpO2 98%   BMI 30.61 kg/m    Body mass index is 30.61 kg/m .  GENERAL: Appears well and in no acute distress.  CARDIOVASCULAR: Regular rate and rhythm, normal S1 and S2 without murmur. No extra heartsounds or friction rub.   RESPIRATORY: Lungs clear to auscultation bilaterally. No wheezing or crackles. No prolonged expiration. Symmetrical chest rise.  MSK: No gross extremity deformities. No peripheral edema. Normal muscle bulk.    Diagnostic Test Results:  EKG - unremarkable    ASSESSMENT/PLAN:     1. Pre-op exam  Repeat EKG today " per her surgeon's request. EKG is of better quality today and shows flipped T waves in V1 and III which given her lack of cardiac symptoms are a normal variant in a 29 year old healthy female. No concerning symptoms of chest pain, shortness of breath, lightheadedness with exertion. No family history of cardiac disease. EKG read as NSR. Cleared for surgery. EKG results will be faxed to surgeon.   - EKG 12-lead complete w/read - Clinics    Schedule follow-up appointment PRN      There are no discontinued medications.    Lyndon Velez MD  [facility]  Pager# [phone]    Precepted with: Alma Corcoran MD    Options for treatment and follow-up care were reviewed with the patient and/or guardian. Laila Powell and/or guardian engaged in the decision making process and verbalized understanding of the options discussed and agreed with the final plan

## 2020-10-28 ENCOUNTER — VIRTUAL VISIT (OUTPATIENT)
Dept: FAMILY MEDICINE | Facility: CLINIC | Age: 30
End: 2020-10-28
Payer: COMMERCIAL

## 2020-10-28 DIAGNOSIS — L50.9 URTICARIA: Primary | ICD-10-CM

## 2020-10-28 PROCEDURE — 99213 OFFICE O/P EST LOW 20 MIN: CPT | Mod: 95 | Performed by: FAMILY MEDICINE

## 2020-10-28 RX ORDER — HYDROXYZINE HYDROCHLORIDE 25 MG/1
25 TABLET, FILM COATED ORAL EVERY 6 HOURS PRN
Qty: 40 TABLET | Refills: 0 | Status: SHIPPED | OUTPATIENT
Start: 2020-10-28 | End: 2020-11-07

## 2020-10-28 RX ORDER — FAMOTIDINE 20 MG/1
20 TABLET, FILM COATED ORAL 2 TIMES DAILY
Qty: 20 TABLET | Refills: 0 | Status: SHIPPED | OUTPATIENT
Start: 2020-10-28 | End: 2020-11-19

## 2020-10-28 NOTE — PROGRESS NOTES
"Family Medicine Video Visit Note  Laila is being evaluated via a billable video visit.               Video Visit Consent     Patient was verbally read the following and verbal consent was obtained.  \"Video visits are billed at different rates depending on your insurance coverage. During this emergency period, for some insurers they may be billed the same as an in-person visit.  Please reach out to your insurance provider with any questions.  If during the course of the call the physician/provider feels a video visit is not appropriate, you will not be charged for this service.\"     (Name person giving consent:  Patient   Date verbal consent given:  10/28/2020  Time verbal consent given:  10:23 AM)    Patient would like the video invitation sent by: URVASHI       Chief Complaint   Patient presents with     Musculoskeletal Problem     Bilateral leg spots, had a procedure done 10/6/20 in [place] and noticed after. Legs inflammed and itchy benadryl is not helping     Medication Reconciliation     Completed                HPI       Video Start Time: 10:31 AM    Laila presents to clinic today for the following health issues:    Rash    Oct 6 - mommy makeover   Breast augmentation, tummy tuck  In [place] -> stayed 10 days including surgery  Prescribed 5 days of pain meds  Was taking Abx for 5-6 days (cephalexin)   Diazepam prn    2-3 days after surgery, noted drainage from tummy tuck   Then noted rash on legs  Not itchy, just spots  Treated w/ benadryl liquid   Improved but never resolved. Then awoke w/ recurrence of same rash x 5 days (maybe worse)  Now is warm, itchy and more diffuse    Location now on thighs, buttocks, and ankle  No fevers. ?chills - \"I'm always cold\"  No family member w/ rash  No nausea/vomiting  No shortness of breath  No dysphagia  Has sensitivity to morphine -> nausea/ vomiting        Current Outpatient Medications   Medication Sig Dispense Refill     famotidine (PEPCID) 20 MG tablet Take 1 tablet (20 " "mg) by mouth 2 times daily for 10 days 20 tablet 0     hydrOXYzine (ATARAX) 25 MG tablet Take 1 tablet (25 mg) by mouth every 6 hours as needed for itching 40 tablet 0     omeprazole (PRILOSEC) 20 MG DR capsule Take 1 capsule (20 mg) by mouth 2 times daily 180 capsule 3     ondansetron (ZOFRAN-ODT) 4 MG ODT tab Take 1 tablet (4 mg) by mouth every 8 hours as needed for nausea 30 tablet 3     SYNTHROID 112 MCG tablet Take 1 tablet (112 mcg) by mouth daily 90 tablet 3     vitamin D3 (CHOLECALCIFEROL) 50 mcg (2000 units) tablet Take 1 tablet (50 mcg) by mouth daily 90 tablet 3     medroxyPROGESTERone (PROVERA) 10 MG tablet        ondansetron (ZOFRAN-ODT) 4 MG ODT tab Take 1 tablet (4 mg) by mouth every 12 hours as needed for nausea (Patient not taking: Reported on 9/22/2020) 30 tablet 0     Allergies   Allergen Reactions     Levothyroxine Swelling     Morphine      Nausea              Review of Systems:     CONSTITUTIONAL: NEGATIVE for fever, chills, change in weight  ENT/MOUTH: NEGATIVE for ear, mouth and throat problems  RESP: NEGATIVE for significant cough or SOB  CV: NEGATIVE for chest pain, palpitations or peripheral edema         Physical Exam:     There were no vitals taken for this visit.  Estimated body mass index is 30.61 kg/m  as calculated from the following:    Height as of 9/22/20: 1.476 m (4' 10.11\").    Weight as of 9/22/20: 66.7 kg (147 lb).    GENERAL: Healthy, alert and no distress  EYES: Eyes grossly normal to inspection.  No discharge or erythema, or obvious scleral/conjunctival abnormalities.  RESP: No audible wheeze, cough, or visible cyanosis.  No visible retractions or increased work of breathing.    SKIN: +urticrial rash w/ erythema and areas of confluence on lower legs, thighs, and buttocks  NEURO: Cranial nerves grossly intact.  Mentation and speech appropriate for age.  PSYCH: Mentation appears normal, affect normal/bright, judgement and insight intact, normal speech and appearance " well-groomed.          Assessment and Plan     (L50.9) Urticaria  (primary encounter diagnosis)  Comment: trial of H1 and H2 blocker; f/u virtual visit in 1 week; will change to in-office visit if no better  Plan: famotidine (PEPCID) 20 MG tablet, hydrOXYzine (ATARAX) 25 MG tablet          Refilled medications that would be required in the next 3 months.     After Visit Information:  Patient chose to view AVS via [employer] in about 1 week (around 11/4/2020) for using a video visit.      Video-Visit Details    Type of service:  Video Visit    Video End Time (time video stopped): 10:47 AM    Originating Location (pt. Location): Home    Distant Location (provider location):  [facility]     Platform used for Video Visit: Joseph Lundberg MD  October 28, 2020  10:47 AM

## 2020-11-08 ENCOUNTER — HEALTH MAINTENANCE LETTER (OUTPATIENT)
Age: 30
End: 2020-11-08

## 2020-11-18 ENCOUNTER — TELEPHONE (OUTPATIENT)
Dept: FAMILY MEDICINE | Facility: CLINIC | Age: 30
End: 2020-11-18

## 2020-11-18 DIAGNOSIS — L50.9 URTICARIA: ICD-10-CM

## 2020-11-19 RX ORDER — FAMOTIDINE 20 MG/1
20 TABLET, FILM COATED ORAL 2 TIMES DAILY
Qty: 28 TABLET | Refills: 0 | Status: SHIPPED | OUTPATIENT
Start: 2020-11-19 | End: 2020-12-08

## 2020-11-24 NOTE — PROGRESS NOTES
"Family Medicine Video Visit Note  Laila is being evaluated via a billable video visit.             Video Visit Consent     Patient was verbally read the following and verbal consent was obtained.  \"Video visits are billed at different rates depending on your insurance coverage. During this emergency period, for some insurers they may be billed the same as an in-person visit.  Please reach out to your insurance provider with any questions.  If during the course of the call the physician/provider feels a video visit is not appropriate, you will not be charged for this service.\"     (Name person giving consent:  Patient   Date verbal consent given:  11/25/2020  Time verbal consent given:  10:48 AM)    Patient would like the video invitation sent by: URVASHI       Chief Complaint   Patient presents with     RECHECK     F/U: Rash on legs pt. reported she took a Rx from what was givem a while ago     Medication Reconciliation     Completed                  HPI       Video Start Time: 11:01 AM    Laila presents to clinic today for the following health issues:    Pt is a 29 year old female last seen on 10/28/20 via virtual visit evaluated for follow-up via virtual visit for:     Rash - treated for urticarial rash  Initially treated w/ H1 and H2 blockers but rash worsened  Then treated w/ 15-day taper of pred   Improved but is still present     Took Nitrofurantoin out of concern for UTI -> noted that her rash improved while on the Abx?   Overall has improved w/ steroids   Rash is not itchy   Now not swollen or itchy but is still present; now looks like dry patches        Current Outpatient Medications   Medication Sig Dispense Refill     omeprazole (PRILOSEC) 20 MG DR capsule Take 1 capsule (20 mg) by mouth 2 times daily 180 capsule 3     ondansetron (ZOFRAN-ODT) 4 MG ODT tab Take 1 tablet (4 mg) by mouth every 8 hours as needed for nausea 30 tablet 3     ondansetron (ZOFRAN-ODT) 4 MG ODT tab Take 1 tablet (4 mg) by mouth every " "12 hours as needed for nausea 30 tablet 0     SYNTHROID 112 MCG tablet Take 1 tablet (112 mcg) by mouth daily 90 tablet 3     vitamin D3 (CHOLECALCIFEROL) 50 mcg (2000 units) tablet Take 1 tablet (50 mcg) by mouth daily 90 tablet 3     famotidine (PEPCID) 20 MG tablet Take 1 tablet (20 mg) by mouth 2 times daily (Patient not taking: Reported on 11/25/2020) 28 tablet 0     medroxyPROGESTERone (PROVERA) 10 MG tablet        Allergies   Allergen Reactions     Levothyroxine Swelling     Morphine      Nausea              Review of Systems:     Constitutional, HEENT, cardiovascular, pulmonary, gi and gu systems are negative, except as otherwise noted.         Physical Exam:     LMP 10/08/2020   Estimated body mass index is 30.61 kg/m  as calculated from the following:    Height as of 9/22/20: 1.476 m (4' 10.11\").    Weight as of 9/22/20: 66.7 kg (147 lb).    GENERAL: Healthy, alert and no distress  EYES: Eyes grossly normal to inspection.  No discharge or erythema, or obvious scleral/conjunctival abnormalities.  RESP: No audible wheeze, cough, or visible cyanosis.  No visible retractions or increased work of breathing.    SKIN:faint erythematous patches on bilateral legs -> improved from last video visit  NEURO: Cranial nerves grossly intact.  Mentation and speech appropriate for age.  PSYCH: Mentation appears normal, affect normal/bright, judgement and insight intact, normal speech and appearance well-groomed.              Assessment and Plan     (L50.9) Urticaria  (primary encounter diagnosis)  Comment:   Plan: I am still unclear if this is the correct diagnosis; I recommended that we do an in-office visit given this has now been a month and I cannot tell over video if the rash has evolved into something else; will see her next week; she will try triamcinolone crm bid to the dry skin patches in the mean time      Refilled medications that would be required in the next 3 months.     After Visit Information:  Patient chose " to view AVS via [employer]      Return in about 8 days (around 12/3/2020) for Follow up, with me, in person.      Video-Visit Details    Type of service:  Video Visit    Video End Time (time video stopped): 11:11 AM    Originating Location (pt. Location): Home    Distant Location (provider location):  [facility]     Platform used for Video Visit: [employer]

## 2020-11-25 ENCOUNTER — VIRTUAL VISIT (OUTPATIENT)
Dept: FAMILY MEDICINE | Facility: CLINIC | Age: 30
End: 2020-11-25
Payer: COMMERCIAL

## 2020-11-25 DIAGNOSIS — L50.9 URTICARIA: Primary | ICD-10-CM

## 2020-11-25 PROCEDURE — 99213 OFFICE O/P EST LOW 20 MIN: CPT | Mod: 95 | Performed by: FAMILY MEDICINE

## 2020-11-25 RX ORDER — FEXOFENADINE HCL 180 MG/1
180 TABLET ORAL DAILY
Qty: 30 TABLET | Refills: 1 | Status: SHIPPED | OUTPATIENT
Start: 2020-11-25 | End: 2021-02-12

## 2020-12-08 DIAGNOSIS — L50.9 URTICARIA: ICD-10-CM

## 2020-12-08 RX ORDER — FAMOTIDINE 20 MG/1
20 TABLET, FILM COATED ORAL 2 TIMES DAILY
Qty: 60 TABLET | Refills: 3 | Status: SHIPPED | OUTPATIENT
Start: 2020-12-08 | End: 2021-02-12

## 2021-01-21 ENCOUNTER — RECORDS - HEALTHEAST (OUTPATIENT)
Dept: ADMINISTRATIVE | Facility: OTHER | Age: 31
End: 2021-01-21

## 2021-02-12 ENCOUNTER — RECORDS - HEALTHEAST (OUTPATIENT)
Dept: ADMINISTRATIVE | Facility: OTHER | Age: 31
End: 2021-02-12

## 2021-02-12 ENCOUNTER — OFFICE VISIT (OUTPATIENT)
Dept: FAMILY MEDICINE | Facility: CLINIC | Age: 31
End: 2021-02-12
Payer: COMMERCIAL

## 2021-02-12 VITALS
OXYGEN SATURATION: 99 % | RESPIRATION RATE: 18 BRPM | SYSTOLIC BLOOD PRESSURE: 112 MMHG | WEIGHT: 144.4 LBS | DIASTOLIC BLOOD PRESSURE: 89 MMHG | BODY MASS INDEX: 30.31 KG/M2 | HEIGHT: 58 IN | HEART RATE: 85 BPM

## 2021-02-12 DIAGNOSIS — R11.0 NAUSEA: ICD-10-CM

## 2021-02-12 DIAGNOSIS — E03.9 HYPOTHYROIDISM, UNSPECIFIED TYPE: ICD-10-CM

## 2021-02-12 DIAGNOSIS — E28.2 PCOS (POLYCYSTIC OVARIAN SYNDROME): ICD-10-CM

## 2021-02-12 DIAGNOSIS — S30.1XXA ABDOMINAL WALL SEROMA, INITIAL ENCOUNTER: Primary | ICD-10-CM

## 2021-02-12 PROCEDURE — 99214 OFFICE O/P EST MOD 30 MIN: CPT | Mod: GC | Performed by: STUDENT IN AN ORGANIZED HEALTH CARE EDUCATION/TRAINING PROGRAM

## 2021-02-12 RX ORDER — LEVOTHYROXINE SODIUM 112 MCG
112 TABLET ORAL DAILY
Qty: 90 TABLET | Refills: 3 | Status: SHIPPED | OUTPATIENT
Start: 2021-02-12 | End: 2021-09-03

## 2021-02-12 RX ORDER — ONDANSETRON 4 MG/1
4 TABLET, ORALLY DISINTEGRATING ORAL EVERY 12 HOURS PRN
Qty: 30 TABLET | Refills: 0 | Status: SHIPPED | OUTPATIENT
Start: 2021-02-12 | End: 2021-06-08

## 2021-02-12 RX ORDER — NORGESTIMATE AND ETHINYL ESTRADIOL 7DAYSX3 28
1 KIT ORAL DAILY
Qty: 28 TABLET | Refills: 3 | Status: SHIPPED | OUTPATIENT
Start: 2021-02-12 | End: 2021-06-08

## 2021-02-12 ASSESSMENT — MIFFLIN-ST. JEOR: SCORE: 1257.74

## 2021-02-12 NOTE — PATIENT INSTRUCTIONS
1. Referral for ultrasound today. Follow up 1 month after your drainage.   2. Refills for zofran and synthroid sent  3. Start taking the Ortho-Tri-Cyclin (oral contraceptive). Let us know if you experience any side effects with this.

## 2021-02-12 NOTE — PROGRESS NOTES
CHIEF COMPLAINT                                                      Chief Complaint   Patient presents with     Refill Request     synthroid, zofran     Referral     referral for procedure injections: [facility] - interventional radiology - [facility] Cazares fax [phone]     SUBJECTIVE:                                                    Laila Powell is a 30 year old year old female who presents to clinic today for the following health issues:    Needs referral:  -Patient had breast augmentation 10/6/2020  -Noticed a bulge in anterior abdomen after the surgery  -Had US done that demonstrated a seroma. Was told she needs to have IR referral to have this drained.   -Not erythematous  -Sometimes painful and will get firm    -Needs refill of synthroid and zofran  -History of PCOS managed with Provera, interested in restarting this or another OCP  -interested in something for contraception  -No unprotected sex since LMP    Patient is an established patient of this clinic.    ----------------------------------------------------------------------------------------------------------------------  Patient Active Problem List   Diagnosis     Health Care Home     Microscopic hematuria     Hypothyroidism     Gastroesophageal reflux disease, esophagitis presence not specified     Vitamin D deficiency     Class 1 obesity due to excess calories without serious comorbidity with body mass index (BMI) of 31.0 to 31.9 in adult     Past Surgical History:   Procedure Laterality Date     ------------OTHER-------------  10/06/2020    BBL - Stomach reconstruction breast augmenatation      CHOLECYSTECTOMY  2013    [facility]       Social History     Tobacco Use     Smoking status: Passive Smoke Exposure - Never Smoker     Smokeless tobacco: Never Used     Tobacco comment:  smokes   Substance Use Topics     Alcohol use: No     Alcohol/week: 0.0 standard drinks     Family History   Problem Relation Age of  "Onset     Diabetes No family hx of      Coronary Artery Disease No family hx of      Hypertension No family hx of      Breast Cancer No family hx of      Colon Cancer No family hx of      Prostate Cancer No family hx of      Other Cancer No family hx of          Problem list and past medical, surgical, social, and family histories reviewed & adjusted, as indicated.    Current Outpatient Medications   Medication Sig Dispense Refill     omeprazole (PRILOSEC) 20 MG DR capsule Take 1 capsule (20 mg) by mouth 2 times daily 180 capsule 3     ondansetron (ZOFRAN-ODT) 4 MG ODT tab Take 1 tablet (4 mg) by mouth every 8 hours as needed for nausea 30 tablet 3     ondansetron (ZOFRAN-ODT) 4 MG ODT tab Take 1 tablet (4 mg) by mouth every 12 hours as needed for nausea 30 tablet 0     SYNTHROID 112 MCG tablet Take 1 tablet (112 mcg) by mouth daily 90 tablet 3     famotidine (PEPCID) 20 MG tablet Take 1 tablet (20 mg) by mouth 2 times daily 60 tablet 3     fexofenadine (ALLEGRA) 180 MG tablet Take 1 tablet (180 mg) by mouth daily 30 tablet 1     medroxyPROGESTERone (PROVERA) 10 MG tablet        vitamin D3 (CHOLECALCIFEROL) 50 mcg (2000 units) tablet Take 1 tablet (50 mcg) by mouth daily 90 tablet 3     Medication list reviewed and updated as indicated.    Allergies   Allergen Reactions     Levothyroxine Swelling     Morphine      Nausea     Allergies reviewed and updated as indicated.  ----------------------------------------------------------------------------------------------------------------------  ROS:     ROS: 10 point ROS neg other than the symptoms noted above in the HPI.    OBJECTIVE:     /89   Pulse 85   Resp 18   Ht 1.47 m (4' 9.87\")   Wt 65.5 kg (144 lb 6.4 oz)   SpO2 99%   BMI 30.31 kg/m    Body mass index is 30.31 kg/m .  GENERAL: Appears well and in no acute distress.  CARDIOVASCULAR: Regular rate and rhythm, normal S1 and S2 without murmur. No extra heartsounds or friction rub.   RESPIRATORY Lungs clear " to auscultation bilaterally. No wheezing or crackles. No prolonged expiration. Symmetrical chest rise.  MSK:  No gross extremity deformities. No peripheral edema. Normal muscle bulk.  ABD: Small soft tissue bulge anterior abdomen, no overlying erythema    Diagnostic Test Results:  none     ASSESSMENT/PLAN:     1. Abdominal wall seroma, initial encounter  Patient with post-operative seroma from tummy tuck procedure. Diagnosed via US. Needs IR referral for drainage. Order for this placed. Will follow up in 1 month after drainage to make sure not recurring.   - US Drainage Seroma/Hematoma Abscess/Cyst; Future    2. Hypothyroidism, unspecified type  Last TSH wnl 9/2020  - SYNTHROID 112 MCG tablet; Take 1 tablet (112 mcg) by mouth daily  Dispense: 90 tablet; Refill: 3    3. PCOS (polycystic ovarian syndrome)  History of PCOS previously managed with provera. Periods are irregular. Looking for something for contraception and weight neutral. Will try ortho-tri-cyclin.  - norgestim-eth estrad triphasic (ORTHO TRI-CYCLEN) 0.18/0.215/0.25 MG-35 MCG tablet; Take 1 tablet by mouth daily  Dispense: 28 tablet; Refill: 3    4. Nausea  Needs refill of zofran.   - ondansetron (ZOFRAN-ODT) 4 MG ODT tab; Take 1 tablet (4 mg) by mouth every 12 hours as needed for nausea  Dispense: 30 tablet; Refill: 0      Schedule follow-up appointment in 1 month(s).      There are no discontinued medications.    Lyndon Velez MD  [facility] Medicine Resident  Pager# [phone]    Precepted with: Dr. Lundberg.     Options for treatment and follow-up care were reviewed with the patient and/or guardian. Laila Poewll and/or guardian engaged in the decision making process and verbalized understanding of the options discussed and agreed with the final plan

## 2021-02-12 NOTE — PROGRESS NOTES
I have personally reviewed the history and examination as documented by Dr. Velez.  I was present during key portions of the visit and agree with the assessment and plan as documented for 30 yr old female w/ abdominal seroma here for follow-up.  Will order for U/S drainage. Contraceptive mgmt reviewed as well. Precautions given. Anticipatory guidance given.     Brian Lundberg MD  February 12, 2021  1:36 PM

## 2021-02-23 ENCOUNTER — AMBULATORY - HEALTHEAST (OUTPATIENT)
Dept: INTERVENTIONAL RADIOLOGY/VASCULAR | Facility: HOSPITAL | Age: 31
End: 2021-02-23

## 2021-02-23 DIAGNOSIS — Z11.59 ENCOUNTER FOR SCREENING FOR OTHER VIRAL DISEASES: ICD-10-CM

## 2021-02-27 ENCOUNTER — AMBULATORY - HEALTHEAST (OUTPATIENT)
Dept: FAMILY MEDICINE | Facility: CLINIC | Age: 31
End: 2021-02-27

## 2021-02-27 DIAGNOSIS — Z11.59 ENCOUNTER FOR SCREENING FOR OTHER VIRAL DISEASES: ICD-10-CM

## 2021-02-28 LAB
SARS-COV-2 PCR COMMENT: NORMAL
SARS-COV-2 RNA SPEC QL NAA+PROBE: NEGATIVE
SARS-COV-2 VIRUS SPECIMEN SOURCE: NORMAL

## 2021-03-01 ENCOUNTER — HOSPITAL ENCOUNTER (OUTPATIENT)
Dept: INTERVENTIONAL RADIOLOGY/VASCULAR | Facility: HOSPITAL | Age: 31
Discharge: HOME | End: 2021-03-01
Admitting: RADIOLOGY

## 2021-03-01 ENCOUNTER — COMMUNICATION - HEALTHEAST (OUTPATIENT)
Dept: SCHEDULING | Facility: CLINIC | Age: 31
End: 2021-03-01

## 2021-03-01 DIAGNOSIS — S30.1XXA ABDOMINAL WALL SEROMA: ICD-10-CM

## 2021-03-01 LAB — HCG UR QL: NEGATIVE

## 2021-03-01 ASSESSMENT — MIFFLIN-ST. JEOR: SCORE: 1255.67

## 2021-03-04 LAB
BACTERIA SPEC CULT: NO GROWTH
GRAM STAIN RESULT: NORMAL
GRAM STAIN RESULT: NORMAL

## 2021-03-05 ENCOUNTER — AMBULATORY - HEALTHEAST (OUTPATIENT)
Dept: INTERVENTIONAL RADIOLOGY/VASCULAR | Facility: HOSPITAL | Age: 31
End: 2021-03-05

## 2021-03-05 ENCOUNTER — HOSPITAL ENCOUNTER (OUTPATIENT)
Dept: INTERVENTIONAL RADIOLOGY/VASCULAR | Facility: HOSPITAL | Age: 31
Discharge: HOME | End: 2021-03-05
Admitting: RADIOLOGY

## 2021-03-05 DIAGNOSIS — L02.91 ABSCESS: ICD-10-CM

## 2021-03-05 DIAGNOSIS — K65.1 ABSCESS OF PERITONEUM (H): ICD-10-CM

## 2021-03-09 ENCOUNTER — HOSPITAL ENCOUNTER (OUTPATIENT)
Dept: INTERVENTIONAL RADIOLOGY/VASCULAR | Facility: HOSPITAL | Age: 31
Discharge: HOME | End: 2021-03-09
Attending: RADIOLOGY

## 2021-03-09 ENCOUNTER — HOSPITAL ENCOUNTER (OUTPATIENT)
Dept: INTERVENTIONAL RADIOLOGY/VASCULAR | Facility: HOSPITAL | Age: 31
Discharge: HOME | End: 2021-03-09

## 2021-03-09 ENCOUNTER — MYC MEDICAL ADVICE (OUTPATIENT)
Dept: FAMILY MEDICINE | Facility: CLINIC | Age: 31
End: 2021-03-09

## 2021-03-09 DIAGNOSIS — E28.2 PCOS (POLYCYSTIC OVARIAN SYNDROME): Primary | ICD-10-CM

## 2021-03-09 DIAGNOSIS — O34.219 PREVIOUS CESAREAN DELIVERY AFFECTING PREGNANCY: ICD-10-CM

## 2021-03-09 DIAGNOSIS — K65.1 ABSCESS OF PERITONEUM (H): ICD-10-CM

## 2021-03-09 LAB — HCG UR QL: NEGATIVE

## 2021-03-09 RX ORDER — MEDROXYPROGESTERONE ACETATE 10 MG
10 TABLET ORAL DAILY
Qty: 28 TABLET | Refills: 3 | Status: SHIPPED | OUTPATIENT
Start: 2021-03-09 | End: 2021-04-30

## 2021-03-09 ASSESSMENT — MIFFLIN-ST. JEOR: SCORE: 1255.67

## 2021-03-09 NOTE — TELEPHONE ENCOUNTER
Sharath Ulloa,   Yes we can reschedule the appointment for this coming Friday since you are still have the drain in place. We can follow this up in a few weeks after you get the drain removed. Sorry you were experiencing side effects with the Ortho-Tri-Cyclin and we can definitely switch back. With switching the contraception and since you stopped taking the other one we do recommend that if you have had unprotected intercourse since your last menstrual period that you check a pregnancy test and then if negative it is OK to start the new medication. If you have not had unprotected intercourse then we just recommend that you use a back up method like a condom for the next week after you start the new medication. I have sent the Provera to the [facility] on [place]. Let us know if you have any side effects with this or if you have any other questions.     Thanks!  Lyndon Velez MD

## 2021-03-11 ENCOUNTER — HOSPITAL ENCOUNTER (OUTPATIENT)
Dept: INTERVENTIONAL RADIOLOGY/VASCULAR | Facility: HOSPITAL | Age: 31
Discharge: HOME | End: 2021-03-11
Attending: RADIOLOGY | Admitting: RADIOLOGY

## 2021-03-11 DIAGNOSIS — O34.219 PREVIOUS CESAREAN DELIVERY AFFECTING PREGNANCY: ICD-10-CM

## 2021-03-11 ASSESSMENT — MIFFLIN-ST. JEOR: SCORE: 1255.67

## 2021-03-19 ENCOUNTER — VIRTUAL VISIT (OUTPATIENT)
Dept: FAMILY MEDICINE | Facility: CLINIC | Age: 31
End: 2021-03-19
Payer: COMMERCIAL

## 2021-03-19 ENCOUNTER — RECORDS - HEALTHEAST (OUTPATIENT)
Dept: ADMINISTRATIVE | Facility: OTHER | Age: 31
End: 2021-03-19

## 2021-03-19 DIAGNOSIS — S30.1XXA ABDOMINAL WALL SEROMA, INITIAL ENCOUNTER: Primary | ICD-10-CM

## 2021-03-19 PROCEDURE — 99213 OFFICE O/P EST LOW 20 MIN: CPT | Mod: 95 | Performed by: STUDENT IN AN ORGANIZED HEALTH CARE EDUCATION/TRAINING PROGRAM

## 2021-03-19 NOTE — PROGRESS NOTES
"Family Medicine Video Visit Note  Laila is being evaluated via a billable video visit.               Video Visit Consent     Patient was verbally read the following and verbal consent was obtained.  \"Video visits are billed at different rates depending on your insurance coverage. During this emergency period, for some insurers they may be billed the same as an in-person visit.  Please reach out to your insurance provider with any questions.  If during the course of the call the physician/provider feels a video visit is not appropriate, you will not be charged for this service.\"     (Name person giving consent:  Patient   Date verbal consent given:  3/19/2021  Time verbal consent given:  11:58 AM)    Patient would like the video invitation sent by: Josee             Chief Complaint   Patient presents with     Throat Problem     Referral                 HPI        Video Start Time: 11:56 AM    Laila presents to clinic today for the following health issues:    Follow Up Seroma:  -patient here for follow up abdominal wall seroma, had drain placed by IR and this was removed 3/11/2021.  -Has since felt like the fluid has returned. Called IR and they wanted repeat imaging.   -Patient states she is OK to go back to surgeon to revise if needed  -Seroma is tense and slightly warm. No increased redness.   -No fevers or chills.         Current Outpatient Medications   Medication Sig Dispense Refill     medroxyPROGESTERone (PROVERA) 10 MG tablet Take 1 tablet (10 mg) by mouth daily 28 tablet 3     norgestim-eth estrad triphasic (ORTHO TRI-CYCLEN) 0.18/0.215/0.25 MG-35 MCG tablet Take 1 tablet by mouth daily 28 tablet 3     omeprazole (PRILOSEC) 20 MG DR capsule Take 1 capsule (20 mg) by mouth 2 times daily 180 capsule 3     ondansetron (ZOFRAN-ODT) 4 MG ODT tab Take 1 tablet (4 mg) by mouth every 12 hours as needed for nausea 30 tablet 0     ondansetron (ZOFRAN-ODT) 4 MG ODT tab Take 1 tablet (4 mg) by mouth every 8 hours as " "needed for nausea 30 tablet 3     SYNTHROID 112 MCG tablet Take 1 tablet (112 mcg) by mouth daily 90 tablet 3     vitamin D3 (CHOLECALCIFEROL) 50 mcg (2000 units) tablet Take 1 tablet (50 mcg) by mouth daily 90 tablet 3     Allergies   Allergen Reactions     Levothyroxine Swelling              Review of Systems:     Constitutional, HEENT, cardiovascular, pulmonary, gi and gu systems are negative, except as otherwise noted.         Physical Exam:     [facility] 03/05/2021   Estimated body mass index is 30.31 kg/m  as calculated from the following:    Height as of 2/12/21: 1.47 m (4' 9.87\").    Weight as of 2/12/21: 65.5 kg (144 lb 6.4 oz).    GENERAL: Healthy, alert and no distress  EYES: Eyes grossly normal to inspection.  No discharge or erythema, or obvious scleral/conjunctival abnormalities.  RESP: No audible wheeze, cough, or visible cyanosis.  No visible retractions or increased work of breathing.    SKIN: Visible skin clear. No significant rash, abnormal pigmentation or lesions.  NEURO: Cranial nerves grossly intact.  Mentation and speech appropriate for age.  PSYCH: Mentation appears normal, affect normal/bright, judgement and insight intact, normal speech and appearance well-groomed.       Results from the last 24 hoursNo results found for this or any previous visit (from the past 24 hour(s)).          Assessment and Plan   1. Abdominal wall seroma, initial encounter  Follow up of abdominal wall seroma. Patient feels like fluid has returned since having the drain removed. Some mild warmth but no redness, fevers, chills to suggest abscess/cellulitis. Called IR and was told needed repeat imaging before getting another drain placed. Will repeat the US and refer to IR if abnormal. Patient has also been in contact with her surgeon from [place]. Patient is willing to go back down to [place] to have a repeat procedure if needed. Will reach out to surgeon after we get the US results.   - US ABDOMEN LIMITED; " Future      Refilled medications that would be required in the next 3 months.     After Visit Information:  Patient declined AVS       No follow-ups on file.      Video-Visit Details    Type of service:  Video Visit    Video End Time (time video stopped): 12:15 PM    Originating Location (pt. Location): Home    Distant Location (provider location):  [facility]     Platform used for Video Visit: Joseph Velez MD  I precepted today with Dr. Bui

## 2021-03-25 ENCOUNTER — RECORDS - HEALTHEAST (OUTPATIENT)
Dept: ADMINISTRATIVE | Facility: OTHER | Age: 31
End: 2021-03-25

## 2021-03-25 ENCOUNTER — TELEPHONE (OUTPATIENT)
Dept: FAMILY MEDICINE | Facility: CLINIC | Age: 31
End: 2021-03-25

## 2021-03-25 ENCOUNTER — RECORDS - HEALTHEAST (OUTPATIENT)
Dept: SCHEDULING | Facility: CLINIC | Age: 31
End: 2021-03-25

## 2021-03-25 ENCOUNTER — ANCILLARY PROCEDURE (OUTPATIENT)
Dept: ULTRASOUND IMAGING | Facility: CLINIC | Age: 31
End: 2021-03-25
Payer: COMMERCIAL

## 2021-03-25 DIAGNOSIS — S30.1XXA ABDOMINAL WALL SEROMA, INITIAL ENCOUNTER: ICD-10-CM

## 2021-03-25 DIAGNOSIS — S30.1XXA ABDOMINAL WALL SEROMA, INITIAL ENCOUNTER: Primary | ICD-10-CM

## 2021-03-25 NOTE — TELEPHONE ENCOUNTER
Called patient to discuss seroma US results. I discussed the case with patient's surgeon from [place] and he would like drain placed to remove fluid again. I fluid < 20 ml does not need to leave drain in. Per his recommendations patient is to have abdominal binder on most of the day. Order sent. Patient OK with IR guided drainage of seroma again, order placed.     1. Abdominal wall seroma, initial encounter  - Elastic Bandages & Supports (ACE ABDOMEN SURGICAL BINDER) MISC; 1 Units daily  Dispense: 1 each; Refill: 0  - US Drainage Seroma/Hematoma Abscess/Cyst; Future      Lyndon Velez MD

## 2021-03-25 NOTE — TELEPHONE ENCOUNTER
Called Debora and needed order updated with surgeon's instruction.     Alexandra can you Fax this telephone encounter and new order over to this number ?    Thanks,  Lyndon Velez MD

## 2021-03-25 NOTE — TELEPHONE ENCOUNTER
Calling to get a more description detail for order that was faxed over, they need to know the aspiration. She states that Dr. Velez should know about this because she spoke to him earlier on the phone. Fax new order to  [phone] and the  is Debora, phone number .

## 2021-03-26 ENCOUNTER — TELEPHONE (OUTPATIENT)
Dept: FAMILY MEDICINE | Facility: CLINIC | Age: 31
End: 2021-03-26

## 2021-03-26 ENCOUNTER — MYC MEDICAL ADVICE (OUTPATIENT)
Dept: FAMILY MEDICINE | Facility: CLINIC | Age: 31
End: 2021-03-26

## 2021-03-26 ENCOUNTER — AMBULATORY - HEALTHEAST (OUTPATIENT)
Dept: INTERVENTIONAL RADIOLOGY/VASCULAR | Facility: HOSPITAL | Age: 31
End: 2021-03-26

## 2021-03-26 DIAGNOSIS — Z11.59 ENCOUNTER FOR SCREENING FOR OTHER VIRAL DISEASES: ICD-10-CM

## 2021-03-26 NOTE — LETTER
3/26/2021         RE: Laila Powell  [address] [place] [address]    To whom it may concern,     Patient was recently seen in clinic. She was seen for a post-operative issue and will continue to have appointments and treatments for this. This will occur once every 2-4 weeks and will likely be ongoing for the next 3 months.       Lyndon Velez MD

## 2021-03-28 ENCOUNTER — AMBULATORY - HEALTHEAST (OUTPATIENT)
Dept: FAMILY MEDICINE | Facility: CLINIC | Age: 31
End: 2021-03-28

## 2021-03-28 DIAGNOSIS — Z11.59 ENCOUNTER FOR SCREENING FOR OTHER VIRAL DISEASES: ICD-10-CM

## 2021-03-30 ENCOUNTER — COMMUNICATION - HEALTHEAST (OUTPATIENT)
Dept: SCHEDULING | Facility: CLINIC | Age: 31
End: 2021-03-30

## 2021-03-30 ENCOUNTER — RECORDS - HEALTHEAST (OUTPATIENT)
Dept: ADMINISTRATIVE | Facility: OTHER | Age: 31
End: 2021-03-30

## 2021-03-31 ENCOUNTER — HOSPITAL ENCOUNTER (OUTPATIENT)
Dept: INTERVENTIONAL RADIOLOGY/VASCULAR | Facility: HOSPITAL | Age: 31
Discharge: HOME | End: 2021-03-31
Admitting: RADIOLOGY

## 2021-03-31 DIAGNOSIS — S30.1XXA ABDOMINAL WALL SEROMA: ICD-10-CM

## 2021-03-31 ASSESSMENT — MIFFLIN-ST. JEOR: SCORE: 1255.67

## 2021-04-05 ENCOUNTER — TELEPHONE (OUTPATIENT)
Dept: FAMILY MEDICINE | Facility: CLINIC | Age: 31
End: 2021-04-05

## 2021-04-05 ENCOUNTER — MYC MEDICAL ADVICE (OUTPATIENT)
Dept: FAMILY MEDICINE | Facility: CLINIC | Age: 31
End: 2021-04-05

## 2021-04-05 DIAGNOSIS — S30.1XXA ABDOMINAL WALL SEROMA, INITIAL ENCOUNTER: Primary | ICD-10-CM

## 2021-04-05 NOTE — TELEPHONE ENCOUNTER
Patient called to schedule an appt but Dr. Velez has nothing soon til 4/26. She would like to leave a message for Dr. Velez as she can't wait for 3 weeks. She states it's the same thing that Dr. Velez had put in order for at her last visit. She states it's for aspiration. Please call and advise.

## 2021-04-05 NOTE — TELEPHONE ENCOUNTER
[facility] Clinic phone call message- general phone call:    Reason for call: Patient called back stating she just spoke with , Patient wants to let  know that her surgeon  is wanting to speak with  about possible treatment plan for patient.  direct number is [phone], ok to text and discuss treatment plan for patient.     Return call needed: Yes    OK to leave a message on voice mail? Yes    Primary language: English      needed? No    Call taken on April 5, 2021 at 4:19 PM by Verónica Powell

## 2021-04-05 NOTE — TELEPHONE ENCOUNTER
Called patient and discussed her symptoms. Initially seen by myself on 2/12/2021 where Seroma first identified. Had IR placed drain on 3/1. Drain removed 3/9. Patient then noticed recurrence of her swelling and repeat US on 3/25 showed recurrence of the seroma. Messaged patient's surgeon and he recommended aspiration if < 20 ml's. Patient then had 8 ml's removed. Patient feels like swelling has recurred. Noticing a hard lump there and some pain. No redness, warmth or fevers to suggest infection. Given the recurrence of the seroma x2 despite drain placement and aspiration will refer patient to local plastic surgeon (her surgeon is located in [place]) for further management.     Discussed with Dr. Reed.     Lyndon Velez MD

## 2021-04-09 NOTE — TELEPHONE ENCOUNTER
Patient contacted through [employer]. See encounter from 4/5/2021.      Lyndon Velez MD

## 2021-04-13 ENCOUNTER — TELEPHONE (OUTPATIENT)
Dept: FAMILY MEDICINE | Facility: CLINIC | Age: 31
End: 2021-04-13

## 2021-04-13 NOTE — TELEPHONE ENCOUNTER
Referral for :     Surgery    LOCATION/PLACE/Provider :    [facility] [place]   DATE & TIME :    April 20th at 10:15 am   PHONE :     [phone]  FAX :    [phone]  Appointment made by clinic staff/:    Aleaxndra    Called and left message for patient, also sent Mychart message in regards to this.

## 2021-04-14 ENCOUNTER — AMBULATORY - HEALTHEAST (OUTPATIENT)
Dept: ADMINISTRATIVE | Facility: CLINIC | Age: 31
End: 2021-04-14

## 2021-04-14 DIAGNOSIS — S30.1XXA ABDOMINAL WALL SEROMA: ICD-10-CM

## 2021-04-20 ENCOUNTER — OFFICE VISIT - HEALTHEAST (OUTPATIENT)
Dept: SURGERY | Facility: CLINIC | Age: 31
End: 2021-04-20

## 2021-04-20 DIAGNOSIS — E66.09 CLASS 1 OBESITY DUE TO EXCESS CALORIES WITHOUT SERIOUS COMORBIDITY WITH BODY MASS INDEX (BMI) OF 31.0 TO 31.9 IN ADULT: ICD-10-CM

## 2021-04-20 DIAGNOSIS — S30.1XXA ABDOMINAL WALL SEROMA, INITIAL ENCOUNTER: ICD-10-CM

## 2021-04-20 DIAGNOSIS — E66.811 CLASS 1 OBESITY DUE TO EXCESS CALORIES WITHOUT SERIOUS COMORBIDITY WITH BODY MASS INDEX (BMI) OF 31.0 TO 31.9 IN ADULT: ICD-10-CM

## 2021-04-20 ASSESSMENT — MIFFLIN-ST. JEOR: SCORE: 1287.42

## 2021-04-21 ENCOUNTER — TELEPHONE (OUTPATIENT)
Dept: FAMILY MEDICINE | Facility: CLINIC | Age: 31
End: 2021-04-21

## 2021-04-21 NOTE — TELEPHONE ENCOUNTER
Acute Sinusitis    Acute sinusitis is irritation and swelling of the sinuses. It is usually caused by a viral infection after a common cold. Your doctor can help you find relief. What is acute sinusitis?   Sinuses are air-filled spaces in the skull behin [facility] phone call message- general phone call:    Reason for call: Patient is to speak to Dr. Velez, she states she went in for her consult and is wondering what the next step is? Patient did schedule an appt with Dr. Velez on 4/30 virtual appt. Told her it could take up to two business days for a response. Please call and advise.     Return call needed: Yes    OK to leave a message on voice mail?     Primary language: English      needed? No    Call taken on April 21, 2021 at 12:23 PM by Maximo Powell   © 7433-1401 [employer]. [address] [facility] [place], [address] [place]. All rights reserved. This information is not intended as a substitute for professional medical care. Always follow your healthcare professional's instructions.         Malcom Alejandre · Stay home until your fever has been gone for at least 24 hours without using medicine to reduce fever. Follow-up care  Follow up with your healthcare provider, or as advised, if you are not getting better over the next week.   If you are age 72 or older,

## 2021-04-22 NOTE — TELEPHONE ENCOUNTER
Called patient and discussed her questions. Patient wondering next steps for seroma and if repeat aspirations would provide benefit. Discussed that since seroma is small and previous aspirations have not provided significant benefit future aspirations likely will not have much benefit. Patient had general surgery consultation for the seroma and they are not recommending surgical intervention at this time and their plan is to give seroma time to heal. Patient interested in starting medication for weight loss and was referred to bariatric program. Would prefer to follow with PCP for this and we will follow this up at her next appointment.    Lyndon Velez MD

## 2021-04-27 ENCOUNTER — VIRTUAL VISIT (OUTPATIENT)
Dept: FAMILY MEDICINE | Facility: CLINIC | Age: 31
End: 2021-04-27
Payer: COMMERCIAL

## 2021-04-27 DIAGNOSIS — N39.0 FREQUENT UTI: ICD-10-CM

## 2021-04-27 DIAGNOSIS — S30.1XXA ABDOMINAL WALL SEROMA, INITIAL ENCOUNTER: ICD-10-CM

## 2021-04-27 DIAGNOSIS — E66.811 CLASS 1 OBESITY WITHOUT SERIOUS COMORBIDITY WITH BODY MASS INDEX (BMI) OF 30.0 TO 30.9 IN ADULT, UNSPECIFIED OBESITY TYPE: ICD-10-CM

## 2021-04-27 DIAGNOSIS — E66.09 CLASS 1 OBESITY DUE TO EXCESS CALORIES WITHOUT SERIOUS COMORBIDITY WITH BODY MASS INDEX (BMI) OF 31.0 TO 31.9 IN ADULT: ICD-10-CM

## 2021-04-27 DIAGNOSIS — E66.811 CLASS 1 OBESITY DUE TO EXCESS CALORIES WITHOUT SERIOUS COMORBIDITY WITH BODY MASS INDEX (BMI) OF 31.0 TO 31.9 IN ADULT: ICD-10-CM

## 2021-04-27 DIAGNOSIS — E28.2 PCOS (POLYCYSTIC OVARIAN SYNDROME): Primary | ICD-10-CM

## 2021-04-27 PROCEDURE — 99214 OFFICE O/P EST MOD 30 MIN: CPT | Mod: 95 | Performed by: STUDENT IN AN ORGANIZED HEALTH CARE EDUCATION/TRAINING PROGRAM

## 2021-04-27 NOTE — PROGRESS NOTES
"Family Medicine Video Visit Note  Laila is being evaluated via a billable video visit.               Video Visit Consent     Patient was verbally read the following and verbal consent was obtained.  \"Video visits are billed at different rates depending on your insurance coverage. During this emergency period, for some insurers they may be billed the same as an in-person visit.  Please reach out to your insurance provider with any questions.  If during the course of the call the physician/provider feels a video visit is not appropriate, you will not be charged for this service.\"     (Name person giving consent:  Patient   Date verbal consent given:  4/27/2021  Time verbal consent given:  4:29 PM)    Patient would like the video invitation sent by: Text to cell phone: [phone]             Chief Complaint   Patient presents with     RECHECK     Medication Reconciliation              HPI       Video Start Time: 4:40 PM    Laila presents to clinic today for the following health issues:    Follow up seroma:  Feels like the seroma is about the same. Some swelling and tenderness persist. Saw the surgeon and recommended watchful waiting.     Weight management:  -interested in losing weight and starting phentermine  -Last BMI in our system 30.31.  -No issues with high blood pressure  -Previously diagnosed with PCOS, unsure if labs were checked  -Eats healthy but feels like she is hungry all the time  -Has decreased the amount of carbs and sweets  -On her feet most of the day.     Frequent UTIs:  Typically will get a UTI whenever she has her period, occurs 4 times per year, no history of bacteria resistance.   -Previously referred to urology for hematuria, they wanted to do cystoscopy but patient declined.     Current Outpatient Medications   Medication Sig Dispense Refill     Elastic Bandages & Supports (ACE ABDOMEN SURGICAL BINDER) MISC 1 Units daily 1 each 0     medroxyPROGESTERone (PROVERA) 10 MG tablet Take 1 tablet (10 mg) " "by mouth daily 28 tablet 3     norgestim-eth estrad triphasic (ORTHO TRI-CYCLEN) 0.18/0.215/0.25 MG-35 MCG tablet Take 1 tablet by mouth daily 28 tablet 3     omeprazole (PRILOSEC) 20 MG DR capsule Take 1 capsule (20 mg) by mouth 2 times daily 180 capsule 3     ondansetron (ZOFRAN-ODT) 4 MG ODT tab Take 1 tablet (4 mg) by mouth every 12 hours as needed for nausea 30 tablet 0     ondansetron (ZOFRAN-ODT) 4 MG ODT tab Take 1 tablet (4 mg) by mouth every 8 hours as needed for nausea 30 tablet 3     SYNTHROID 112 MCG tablet Take 1 tablet (112 mcg) by mouth daily 90 tablet 3     vitamin D3 (CHOLECALCIFEROL) 50 mcg (2000 units) tablet Take 1 tablet (50 mcg) by mouth daily 90 tablet 3     Allergies   Allergen Reactions     Levothyroxine Swelling              Review of Systems:     Constitutional, HEENT, cardiovascular, pulmonary, gi and gu systems are negative, except as otherwise noted.         Physical Exam:     There were no vitals taken for this visit.  Estimated body mass index is 30.31 kg/m  as calculated from the following:    Height as of 2/12/21: 1.47 m (4' 9.87\").    Weight as of 2/12/21: 65.5 kg (144 lb 6.4 oz).    GENERAL: Healthy, alert and no distress  EYES: Eyes grossly normal to inspection.  No discharge or erythema, or obvious scleral/conjunctival abnormalities.  RESP: No audible wheeze, cough, or visible cyanosis.  No visible retractions or increased work of breathing.    SKIN: Visible skin clear. No significant rash, abnormal pigmentation or lesions.  NEURO: Cranial nerves grossly intact.  Mentation and speech appropriate for age.  PSYCH: Mentation appears normal, affect normal/bright, judgement and insight intact, normal speech and appearance well-groomed.      Results from last visit:  Office Visit on 09/10/2020   Component Date Value Ref Range Status     WBC 09/10/2020 5.1  4.0 - 11.0 K/uL Final     Lymphocytes # 09/10/2020 1.8  0.8 - 5.3 K/uL Final     % Lymphocytes 09/10/2020 34.6  20.0 - 48.0 %L " Final     Mid # 09/10/2020 0.3  0.0 - 2.2 K/uL Final     Mid % 09/10/2020 6.7  0.0 - 20.0 %M Final     GRANULOCYTES # 09/10/2020 3.0  1.6 - 8.3 K/uL Final     % Granulocytes 09/10/2020 58.7  40.0 - 75.0 %G Final     RBC 09/10/2020 4.83  3.80 - 5.20 M/uL Final     Hemoglobin 09/10/2020 14.0  11.7 - 15.7 g/dL Final     Hematocrit 09/10/2020 45.9  35.0 - 47.0 % Final     MCV 09/10/2020 95.0  78.0 - 100.0 fL Final     MCH 09/10/2020 29.0  26.5 - 35.0 pg Final     MCHC 09/10/2020 30.5* 32.0 - 36.0 g/dL Final     Platelets 09/10/2020 343.0  150.0 - 450.0 K/uL Final     TSH 09/10/2020 0.64  0.30 - 5.00 uIU/mL Final     Beta hCG, Quantitative 09/10/2020 <2  0 - 4 mlU/mL Final     PTT 09/10/2020 29  24 - 37 seconds Final     INR 09/10/2020 0.93  0.90 - 1.10 Final     Sodium 09/10/2020 139  136 - 145 mmol/L Final     Potassium 09/10/2020 4.0  3.5 - 5.0 mmol/L Final     Chloride 09/10/2020 105  98 - 107 mmol/L Final     CO2, Total 09/10/2020 23  22 - 31 mmol/L Final     Anion Gap 09/10/2020 11  5 - 18 mmol/L Final     Glucose 09/10/2020 79  70 - 125 mg/dL Final     Urea Nitrogen 09/10/2020 13  8 - 22 mg/dL Final     Creatinine 09/10/2020 0.63  0.60 - 1.10 mg/dL Final     GFR Estimate If Black 09/10/2020 >60  >60 mL/min/1.73m2 Final     GFR Estimate 09/10/2020 >60  >60 mL/min/1.73m2 Final     Bilirubin Total 09/10/2020 0.4  0.0 - 1.0 mg/dL Final     Calcium 09/10/2020 9.7  8.5 - 10.5 mg/dL Final     Protein Total 09/10/2020 7.9  6.0 - 8.0 g/dL Final     Albumin 09/10/2020 4.3  3.5 - 5.0 g/dL Final     Alkaline Phosphatase 09/10/2020 55  45 - 120 U/L Final     AST (SGOT) 09/10/2020 40  0 - 40 U/L Final     ALT (SGPT) 09/10/2020 44  0 - 45 U/L Final             Assessment and Plan   1. PCOS (polycystic ovarian syndrome)  History of PCOS and cannot find prior lab work. Will check A1C and lipid panel as patient interested in weight reduction. Recommended metformin today however patient would prefer to start phentermine to induce  weight loss and then maintain this weight loss with metformin.   - Hemoglobin A1c ([facility]); Future  - Lipid Panel ([facility]) - Results < 1 hr; Future    2. Class 1 obesity due to excess calories without serious comorbidity with body mass index (BMI) of 31.0 to 31.9 in adult  Meets criteria for starting phentermine with BMI >30 (BMI last 30.31 here) also with complications of obesity including elevated cholesterol and PCOS. No contraindications including HTN, pregnancy, drug use. We discussed starting this medication as a short term option to induce weight loss and to continue with healthy diet and exercise to maintain this. Will continue this medication for 3 months then switch to metformin or orlistat. Currently on her period so pregnancy unlikely was on provera but patient would like to switch back to ortho-tri cyclen. Has refills at the pharmacy. She will use back up contraception while making the transition to this. Counseled her to take contraception regularly while on phentermine due to risks to fetus.   - phentermine (ADIPEX-P) 15 MG capsule; Take 1 capsule (15 mg) by mouth every morning  Dispense: 30 capsule; Refill: 0  -Follow up in 1 month for weight check.     3. Frequent UTI  Patient with frequent UTI whenever she gets her period. No symptoms currently. Requesting antibiotic to take when she gets symptoms. No culture data in our system and has history of hematuria. Will plan on having patient return for visit if she develops symptoms and treat based on culture data given the recurrence and risk for resistance.     Abdominal wall seroma, initial encounter  Needs paper prescription for surgical binder.   - Elastic Bandages & Supports (ACE ABDOMEN SURGICAL BINDER) MISC; 1 Units daily  Dispense: 1 each; Refill: 0      Refilled medications that would be required in the next 3 months.     After Visit Information:  Patient declined AVS       No follow-ups on file.      Video-Visit Details    Type of service:   Video Visit    Video End Time (time video stopped): 5:10 PM    Originating Location (pt. Location): Home    Distant Location (provider location):  [facility]     Platform used for Video Visit: Joseph Velez MD  I precepted today with Dr. Hernández

## 2021-04-29 DIAGNOSIS — E28.2 PCOS (POLYCYSTIC OVARIAN SYNDROME): ICD-10-CM

## 2021-04-29 DIAGNOSIS — E66.09 CLASS 1 OBESITY DUE TO EXCESS CALORIES WITHOUT SERIOUS COMORBIDITY WITH BODY MASS INDEX (BMI) OF 31.0 TO 31.9 IN ADULT: ICD-10-CM

## 2021-04-29 DIAGNOSIS — E66.811 CLASS 1 OBESITY DUE TO EXCESS CALORIES WITHOUT SERIOUS COMORBIDITY WITH BODY MASS INDEX (BMI) OF 31.0 TO 31.9 IN ADULT: ICD-10-CM

## 2021-04-29 LAB
CHOLEST SERPL-MCNC: 184 MG/DL
CHOLEST/HDLC SERPL: 4.5 RATIO
HBA1C MFR BLD: 5.4 % (ref 4.1–5.7)
HDLC SERPL-MCNC: 41 MG/DL
LDLC SERPL CALC-MCNC: 104 MG/DL (ref 0–99)
TRIGL SERPL-MCNC: 195 MG/DL
TSH SERPL DL<=0.05 MIU/L-ACNC: 1.31 UIU/ML (ref 0.3–5)
VLDL-CHOLESTEROL: 39 MG/DL (ref 7–32)

## 2021-04-29 PROCEDURE — 36415 COLL VENOUS BLD VENIPUNCTURE: CPT

## 2021-04-29 PROCEDURE — 80061 LIPID PANEL: CPT

## 2021-04-29 PROCEDURE — 83036 HEMOGLOBIN GLYCOSYLATED A1C: CPT

## 2021-04-30 RX ORDER — PHENTERMINE HYDROCHLORIDE 15 MG/1
15 CAPSULE ORAL EVERY MORNING
Qty: 30 CAPSULE | Refills: 0 | Status: SHIPPED | OUTPATIENT
Start: 2021-04-30 | End: 2021-05-24

## 2021-04-30 NOTE — RESULT ENCOUNTER NOTE
Called patient and updated on the results. Thyroid level normal. A1C normal. LDL slightly elevated at 104. Discussed initiation of phentermine and prescription for 15 mg sent to pharmacy. Will have patient follow up in 1 month for weight check.     Lyndon Velez MD

## 2021-05-03 NOTE — PROGRESS NOTES
Preceptor Attestation:  Patient's case reviewed and discussed with the resident, Lyndon Velez MD, and I personally evaluated the patient. I agree with written assessment and plan of care.    Supervising Physician:  Joycelyn Hernández MD   [facility]

## 2021-05-24 ENCOUNTER — OFFICE VISIT (OUTPATIENT)
Dept: FAMILY MEDICINE | Facility: CLINIC | Age: 31
End: 2021-05-24
Payer: COMMERCIAL

## 2021-05-24 VITALS
OXYGEN SATURATION: 98 % | RESPIRATION RATE: 20 BRPM | WEIGHT: 143 LBS | SYSTOLIC BLOOD PRESSURE: 130 MMHG | DIASTOLIC BLOOD PRESSURE: 80 MMHG | BODY MASS INDEX: 30.02 KG/M2

## 2021-05-24 DIAGNOSIS — E66.09 CLASS 1 OBESITY DUE TO EXCESS CALORIES WITHOUT SERIOUS COMORBIDITY WITH BODY MASS INDEX (BMI) OF 31.0 TO 31.9 IN ADULT: ICD-10-CM

## 2021-05-24 DIAGNOSIS — E66.811 CLASS 1 OBESITY DUE TO EXCESS CALORIES WITHOUT SERIOUS COMORBIDITY WITH BODY MASS INDEX (BMI) OF 31.0 TO 31.9 IN ADULT: ICD-10-CM

## 2021-05-24 PROCEDURE — 99213 OFFICE O/P EST LOW 20 MIN: CPT | Mod: GC | Performed by: STUDENT IN AN ORGANIZED HEALTH CARE EDUCATION/TRAINING PROGRAM

## 2021-05-24 RX ORDER — PHENTERMINE HYDROCHLORIDE 15 MG/1
15 CAPSULE ORAL EVERY MORNING
Qty: 30 CAPSULE | Refills: 0 | Status: SHIPPED | OUTPATIENT
Start: 2021-05-24 | End: 2021-07-30

## 2021-05-24 RX ORDER — TOPIRAMATE 50 MG/1
50 TABLET, FILM COATED ORAL DAILY
Qty: 30 TABLET | Refills: 0 | Status: SHIPPED | OUTPATIENT
Start: 2021-05-24 | End: 2021-06-08

## 2021-05-24 NOTE — PROGRESS NOTES
ASSESSMENT/PLAN:     Class 1 obesity due to excess calories without serious comorbidity with body mass index (BMI) of 31.0 to 31.9 in adult  Last weight in our system was 144 and today 143. Given the lack of improvement in weight loss will add on Topamax today to help with satiety. Will start low and titrate up at future visits. Discussed dietary changes including intermittent fasting today.   - topiramate (TOPAMAX) 50 MG tablet  Dispense: 30 tablet; Refill: 0  - phentermine (ADIPEX-P) 15 MG capsule  Dispense: 30 capsule; Refill: 0       There are no Patient Instructions on file for this visit.       Schedule follow-up appointment in 1 month(s) or earlier for preop      Medications Discontinued During This Encounter   Medication Reason     phentermine (ADIPEX-P) 15 MG capsule Reorder       Lyndon Velez MD  [facility] Resident  Pager# [phone]    Precepted with: Tuan Reed MD    Options for treatment and follow-up care were reviewed with the patient and/or guardian. Laila Powell and/or guardian engaged in the decision making process and verbalized understanding of the options discussed and agreed with the final plan    CHIEF COMPLAINT                                                      Chief Complaint   Patient presents with     RECHECK     Pre-Op Exam     Tummy tuck      Refill Request     Vitamin D     Medication Reconciliation     Vitamin D once a week      SUBJECTIVE:                                                    Laila Powell is a 30 year old year old female who presents to clinic today for the following health issues:    Weight recheck  -Started on phentermine for weight loss at last visit  -Noticed some decrease in her appetite after breakfast but not around lunch.   -tolerating the medication well, no side effects including : headaches, palpitations  -Will be having a revision of her prior surgery in the coming weeks and will need a preop    Patient is an established patient of this  clinic.    ----------------------------------------------------------------------------------------------------------------------  Patient Active Problem List   Diagnosis     Health Care Home     Microscopic hematuria     Hypothyroidism     Gastroesophageal reflux disease, esophagitis presence not specified     Vitamin D deficiency     Class 1 obesity due to excess calories without serious comorbidity with body mass index (BMI) of 31.0 to 31.9 in adult     Past Surgical History:   Procedure Laterality Date     ------------OTHER-------------  10/06/2020    BBL - Stomach reconstruction breast augmenatation      CHOLECYSTECTOMY  2013    [facility]       Social History     Tobacco Use     Smoking status: Passive Smoke Exposure - Never Smoker     Smokeless tobacco: Never Used     Tobacco comment:  smokes   Substance Use Topics     Alcohol use: No     Alcohol/week: 0.0 standard drinks     Family History   Problem Relation Age of Onset     Diabetes No family hx of      Coronary Artery Disease No family hx of      Hypertension No family hx of      Breast Cancer No family hx of      Colon Cancer No family hx of      Prostate Cancer No family hx of      Other Cancer No family hx of          Problem list and past medical, surgical, social, and family histories reviewed & adjusted, as indicated.    Current Outpatient Medications   Medication Sig Dispense Refill     phentermine (ADIPEX-P) 15 MG capsule Take 1 capsule (15 mg) by mouth every morning 30 capsule 0     SYNTHROID 112 MCG tablet Take 1 tablet (112 mcg) by mouth daily 90 tablet 3     topiramate (TOPAMAX) 50 MG tablet Take 1 tablet (50 mg) by mouth daily 30 tablet 0     vitamin D3 (CHOLECALCIFEROL) 50 mcg (2000 units) tablet Take 1 tablet (50 mcg) by mouth daily 90 tablet 3     Elastic Bandages & Supports (ACE ABDOMEN SURGICAL BINDER) MISC 1 Units daily 1 each 0     norgestim-eth estrad triphasic (ORTHO TRI-CYCLEN) 0.18/0.215/0.25 MG-35 MCG  tablet Take 1 tablet by mouth daily 28 tablet 3     omeprazole (PRILOSEC) 20 MG DR capsule Take 1 capsule (20 mg) by mouth 2 times daily 180 capsule 3     ondansetron (ZOFRAN-ODT) 4 MG ODT tab Take 1 tablet (4 mg) by mouth every 12 hours as needed for nausea 30 tablet 0     ondansetron (ZOFRAN-ODT) 4 MG ODT tab Take 1 tablet (4 mg) by mouth every 8 hours as needed for nausea 30 tablet 3     Medication list reviewed and updated as indicated.    Allergies   Allergen Reactions     Levothyroxine Swelling     Allergies reviewed and updated as indicated.  ----------------------------------------------------------------------------------------------------------------------  ROS:     ROS: 10 point ROS neg other than the symptoms noted above in the HPI.    OBJECTIVE:     /80   Resp 20   Wt 64.9 kg (143 lb)   LMP 04/05/2021   SpO2 98%   BMI 30.02 kg/m    Body mass index is 30.02 kg/m .  GENERAL: Appears well and in no acute distress.  CARDIOVASCULAR: Regular rate and rhythm, normal S1 and S2 without murmur. No extra heartsounds or friction rub.   RESPIRATORY: Lungs clear to auscultation bilaterally. No wheezing or crackles. No prolonged expiration. Symmetrical chest rise.  MSK: No gross extremity deformities. No peripheral edema. Normal muscle bulk.    Diagnostic Test Results:  none     .  Wt Readings from Last 3 Encounters:   05/24/21 64.9 kg (143 lb)   02/12/21 65.5 kg (144 lb 6.4 oz)   09/22/20 66.7 kg (147 lb)

## 2021-05-31 VITALS — BODY MASS INDEX: 30.44 KG/M2 | WEIGHT: 151 LBS | HEIGHT: 59 IN

## 2021-05-31 NOTE — TELEPHONE ENCOUNTER
"\"I have a sore scratchy throat and my neck looks swollen.\"  This started 3 days ago.    Wondering if she has strep throat.    Has a runny nose and that started 3 days ago    Hurts to swallow    Is able to put her chin to her chest and when she does this there feels like there is a ball pushing from the front of the throat pushing into her esophagus     Breathing is fine    Is able to drink to fluids    Has not checked her temp but she states \"I feel a warmness in my head\"    It hurts to cough    Cannot tell if there is any puss on the tonsils    There is a headache present only when she is coughing.    No rash    Is able to open mouth completely    Rates her pain at an 8 out of 10.    Advised that she be seen today.    Options for care given to the patient.    Angélica Donnelly RN   [facility] Medication Refill and Triage Nurse  3:28 PM  8/25/2019      Reason for Disposition    SEVERE (e.g., excruciating) throat pain    Protocols used: SORE THROAT-A-AH      "

## 2021-05-31 NOTE — PATIENT INSTRUCTIONS - HE
Drinks lots of fluids  Alternate tylenol and ibuprofen   Cepacol throat lozenges  Mucinex for congestion

## 2021-05-31 NOTE — PROGRESS NOTES
Subjective:   Laila Powell is a 28 y.o. year old female who presents to clinic today for the following health issues:    Chief Complaint   Patient presents with     swollen glands     headaches, runny nose, coughing, soreness in neck and swollen glands, headaches x 3 days      Patient presents with 3-day history of sore throat, nasal congestion, headaches, and swollen glands.  She is concerned because it is painful and difficult to swallow. She feels as though her throat is swollen. She denies fevers, chills, nausea, vomiting, rash, or difficulty breathing.  She does have history of hypothyroidism and gets her levels checked every 6 months.         PMHX:   PAST MEDICAL HISTORY:  Patient Active Problem List   Diagnosis     Previous  delivery affecting pregnancy     Polyhydramnios in third trimester, antepartum     , delivered     Pregnant       CURRENT MEDICATIONS:  Current Outpatient Medications   Medication Sig Dispense Refill     acetaminophen (TYLENOL) 500 MG tablet Take 500 mg by mouth every 6 (six) hours as needed for pain.       ferrous sulfate 325 (65 FE) MG tablet Take 1 tablet (325 mg total) by mouth 2 (two) times a day with meals.  0     ibuprofen (ADVIL,MOTRIN) 800 MG tablet Take 1 tablet (800 mg total) by mouth every 8 (eight) hours. 45 tablet 0     ranitidine (ZANTAC) 150 MG tablet Take 150 mg by mouth as needed for heartburn.       SYNTHROID 88 mcg tablet Take 80 mcg by mouth once daily.  0     No current facility-administered medications for this visit.        ALLERGIES:  Allergies   Allergen Reactions     Levothyroxine               Objective:     Vitals:    19 1548   BP: 101/68   Pulse: 87   Resp: 14   Temp: 98.8  F (37.1  C)   TempSrc: Oral   SpO2: 99%   Weight: 147 lb 8 oz (66.9 kg)     Body mass index is 29.79 kg/m .    General: No acute distress  HEENT: moist mucous membranes, pupils equal, round, reactive to light and accomodation, patent oropharynx without swelling,  erythematous posterior pharynx without exudates, , tympanic membranes are pearly grey bilaterally, mild erythematous external ear canals  Lymph: Cervical lymphadenopathy, thyroid diffusely enlarged but nontender  Cardiovascular: regular rate and rhythm with no murmur  Pulmonary: clear to auscultation bilaterally with no wheeze  Extremities: warm and well perfused  Skin: warm and dry with no rash  Psych: Appropriate affect, memory intact    Recent Results (from the past 24 hour(s))   Rapid Strep A Screen-Throat   Result Value Ref Range    Rapid Strep A Antigen No Group A Strep detected, presumptive negative No Group A Strep detected, presumptive negative       Assessment and Plan:   1. Viral URI with cough  2. Throat swelling  3 day history of progressive URI symptoms, along with the feeling of pain and difficulty swallowing. Patient was well appearing on exam, breathing comfortably with patent airway but we did get a neck soft tissue xray given her sensation which was unremarkable. Rapid strep was also negative. We discussed symptomatic management including increasing hydration, alternating tylenol and ibuprofen, cepacol throat lozenges and mucinex for congestion. Return precautions were advised.   - Rapid Strep A Screen-Throat  - Group A Strep, RNA Direct Detection, Throat  - XR Neck Soft Tissue       Karissa Quiñones DO

## 2021-06-02 ENCOUNTER — RECORDS - HEALTHEAST (OUTPATIENT)
Dept: ADMINISTRATIVE | Facility: CLINIC | Age: 31
End: 2021-06-02

## 2021-06-02 ENCOUNTER — OFFICE VISIT (OUTPATIENT)
Dept: FAMILY MEDICINE | Facility: CLINIC | Age: 31
End: 2021-06-02
Payer: COMMERCIAL

## 2021-06-02 VITALS
DIASTOLIC BLOOD PRESSURE: 81 MMHG | BODY MASS INDEX: 30.65 KG/M2 | RESPIRATION RATE: 20 BRPM | WEIGHT: 146 LBS | SYSTOLIC BLOOD PRESSURE: 120 MMHG | HEART RATE: 88 BPM | OXYGEN SATURATION: 98 %

## 2021-06-02 DIAGNOSIS — Z01.818 PREOP GENERAL PHYSICAL EXAM: Primary | ICD-10-CM

## 2021-06-02 LAB
ERYTHROCYTE [DISTWIDTH] IN BLOOD BY AUTOMATED COUNT: 11.6 % (ref 10–15)
HCT VFR BLD AUTO: 39.9 % (ref 35–47)
HEMOGLOBIN: 13.7 G/DL (ref 11.7–15.7)
MCH RBC QN AUTO: 29.4 PG (ref 26.5–35)
MCHC RBC AUTO-ENTMCNC: 34.3 G/DL (ref 32–36)
MCV RBC AUTO: 85.6 FL (ref 78–100)
PLATELET # BLD AUTO: 329 10E9/L (ref 150–450)
RBC # BLD AUTO: 4.66 10E12/L (ref 3.8–5.2)
WBC # BLD AUTO: 5.7 10E9/L (ref 4–11)

## 2021-06-02 PROCEDURE — 93000 ELECTROCARDIOGRAM COMPLETE: CPT | Mod: GC | Performed by: STUDENT IN AN ORGANIZED HEALTH CARE EDUCATION/TRAINING PROGRAM

## 2021-06-02 PROCEDURE — 99214 OFFICE O/P EST MOD 30 MIN: CPT | Mod: GC | Performed by: STUDENT IN AN ORGANIZED HEALTH CARE EDUCATION/TRAINING PROGRAM

## 2021-06-02 PROCEDURE — 36415 COLL VENOUS BLD VENIPUNCTURE: CPT | Performed by: STUDENT IN AN ORGANIZED HEALTH CARE EDUCATION/TRAINING PROGRAM

## 2021-06-02 PROCEDURE — 85027 COMPLETE CBC AUTOMATED: CPT | Performed by: STUDENT IN AN ORGANIZED HEALTH CARE EDUCATION/TRAINING PROGRAM

## 2021-06-02 NOTE — Clinical Note
Sharath Burnham,   Can you fax this and CBC/EKG to [facility]. Fax # [phone]

## 2021-06-02 NOTE — PATIENT INSTRUCTIONS

## 2021-06-02 NOTE — PROGRESS NOTES
Preceptor Attestation:   Patient seen, evaluated and discussed with the resident. I personally viewed the EKG and agree with the interpretation documented by the resident. I have verified the content of the note, which accurately reflects my assessment of the patient and the plan of care.  Supervising Physician:Rod Bui MD  [facility]

## 2021-06-03 VITALS — BODY MASS INDEX: 29.79 KG/M2 | WEIGHT: 147.5 LBS

## 2021-06-04 VITALS
OXYGEN SATURATION: 98 % | DIASTOLIC BLOOD PRESSURE: 74 MMHG | RESPIRATION RATE: 16 BRPM | BODY MASS INDEX: 30.32 KG/M2 | HEART RATE: 86 BPM | SYSTOLIC BLOOD PRESSURE: 113 MMHG | TEMPERATURE: 97.9 F | WEIGHT: 150.13 LBS

## 2021-06-04 VITALS
WEIGHT: 151.1 LBS | DIASTOLIC BLOOD PRESSURE: 77 MMHG | TEMPERATURE: 98.3 F | BODY MASS INDEX: 30.52 KG/M2 | HEART RATE: 94 BPM | RESPIRATION RATE: 16 BRPM | SYSTOLIC BLOOD PRESSURE: 118 MMHG | OXYGEN SATURATION: 97 %

## 2021-06-05 VITALS — WEIGHT: 140 LBS | HEIGHT: 59 IN | BODY MASS INDEX: 28.22 KG/M2

## 2021-06-05 VITALS — BODY MASS INDEX: 28.22 KG/M2 | HEIGHT: 59 IN | WEIGHT: 140 LBS

## 2021-06-05 VITALS
WEIGHT: 147 LBS | BODY MASS INDEX: 29.64 KG/M2 | SYSTOLIC BLOOD PRESSURE: 128 MMHG | HEIGHT: 59 IN | DIASTOLIC BLOOD PRESSURE: 70 MMHG

## 2021-06-06 NOTE — TELEPHONE ENCOUNTER
----- Message from Natalie Monique MD sent at 2/28/2020  7:49 PM CST -----  Patient's urine culture positive for E. coli.  Patient was not started on antibiotics at the time of her visit.  I sent over a prescription to [facility] pharmacy for nitrofurantoin.  Please notify patient that this prescription is waiting for her.     Negative for gonorrhea and chlamydia.        Natalie Monique M.D. 2/28/2020 7:48 PM

## 2021-06-06 NOTE — TELEPHONE ENCOUNTER
Call ad left message for pt to return call regarding result. Callback number provided.    BRIDGER Chapman  3/3/2020  11:17 AM

## 2021-06-06 NOTE — TELEPHONE ENCOUNTER
Call and spoke with patient and message was given. All questions answered. Pt will wait to hear back regarding GCC and urine culture.    BRIDGER Chapman  2/27/2020  4:00 PM

## 2021-06-06 NOTE — TELEPHONE ENCOUNTER
Call and spoke with patient and message was given. No further questions.    BRIDGER Chapman  3/5/2020  9:09 AM

## 2021-06-06 NOTE — TELEPHONE ENCOUNTER
----- Message from Virgil Diaz MD sent at 2/27/2020  3:53 PM CST -----  Support staff to please contact patient and relay the following:  -You have tested negative for yeast infection, bacterial vaginosis, and trichomonas.

## 2021-06-06 NOTE — PROGRESS NOTES
[facility] Note                                                        Date of Visit: 2/27/2020     Chief Complaint   Laila Powell is a(n) 29 y.o.  female who presents to [facility] with the following complaint(s):  Dysuria (x1 week, intermittent, patient states she feels this way normally after her menses ends, worse this time.)       Assessment and Plan   1. Dysuria  - Urinalysis-UC if Indicated  - Culture, Urine  - Wet Prep, Vaginal  - Chlamydia trachomatis & Neisseria gonorrhoeae, Amplified Detection    2. Vaginal discharge  - Wet Prep, Vaginal  - Chlamydia trachomatis & Neisseria gonorrhoeae, Amplified Detection    3. Vaginal odor  - Wet Prep, Vaginal  - Chlamydia trachomatis & Neisseria gonorrhoeae, Amplified Detection      Patient presenting with 1-week history of urinary symptoms. Urinalysis is not indicative of a UTI. Self-collected Wet Prep is negative for bacterial vaginosis, trichomonas, and yeast. Gonorrhea / Chlamydia testing and Urine Culture are in process. Appropriate antibiotic therapy will be provided if indicated.     Counseled patient regarding assessment and plan for evaluation and treatment. Questions were answered. See AVS for the specific written instructions and educational handout(s) regarding dysuria that were provided at the conclusion of the visit.     Discussed signs / symptoms that warrant urgent / emergent medical attention.     Follow up with Primary Care in 5 days if symptoms persist.      History of Present Illness   Primary symptom: Urinary symptoms  Onset: 1 week ago  Progression: Worsening  Dysuria: Yes  Polyuria: Yes  Nocturia: Yes  Urgency: Yes, with decreased urine volume  Hematuria: Yes  Suprapubic pain: No  Back / flank pain: No  Vaginal symptoms: Has noted vaginal discharge and odor.   Fevers: No  Chills: No  History of urinary tract infection: Yes, treated by her OB/Gyn at [facility]. Last treated with nitrofurantoin several months ago. Has  been told that she should be seen by Urology due to hematuria and recurrent UTIs.   History of pyelonephritis: No  Menstrual status: First day of LMP was 1/28/2020. Had a negative pregnancy test yesterday. Is using vaginal progesterone for fertility treatments.      Review of Systems   Review of Systems   All other systems reviewed and are negative.       Physical Exam   Vitals:    02/27/20 1355   BP: 118/77   Pulse: 94   Resp: 16   Temp: 98.3  F (36.8  C)   TempSrc: Oral   SpO2: 97%   Weight: 151 lb 1.6 oz (68.5 kg)     Physical Exam  Vitals signs and nursing note reviewed.   Constitutional:       General: She is not in acute distress.     Appearance: She is well-developed and normal weight. She is not ill-appearing or toxic-appearing.   Cardiovascular:      Rate and Rhythm: Normal rate and regular rhythm.      Heart sounds: S1 normal and S2 normal. No murmur. No friction rub. No gallop.    Pulmonary:      Effort: Pulmonary effort is normal.      Breath sounds: Normal breath sounds. No stridor. No wheezing, rhonchi or rales.   Abdominal:      General: Bowel sounds are normal. There is no distension.      Palpations: Abdomen is soft. There is no mass.      Tenderness: There is no abdominal tenderness. There is no right CVA tenderness or left CVA tenderness.   Skin:     General: Skin is warm and dry.      Coloration: Skin is not pale.      Findings: No rash.   Neurological:      General: No focal deficit present.      Mental Status: She is alert and oriented to person, place, and time.          Diagnostic Studies   Laboratory:  Results for orders placed or performed in visit on 02/27/20   Wet Prep, Vaginal    Specimen: Genital   Result Value Ref Range    Yeast Result No yeast seen No yeast seen    Trichomonas No Trichomonas seen No Trichomonas seen    Clue Cells, Wet Prep No Clue cells seen No Clue cells seen   Urinalysis-UC if Indicated   Result Value Ref Range    Color, UA Yellow Colorless, Yellow, Straw, Light  Yellow    Clarity, UA Clear Clear    Glucose,  mg/dL (!) Negative    Bilirubin, UA Negative Negative    Ketones, UA Negative Negative    Specific Gravity, UA >=1.030 1.005 - 1.030    Blood, UA Moderate (!) Negative    pH, UA 5.5 5.0 - 8.0    Protein, UA 30 mg/dL (!) Negative mg/dL    Urobilinogen, UA 0.2 E.U./dL 0.2 E.U./dL, 1.0 E.U./dL    Nitrite, UA Negative Negative    Leukocytes, UA Negative Negative    Bacteria, UA None Seen None Seen hpf    RBC, UA 3-5 (!) None Seen, 0-2 hpf    WBC, UA None Seen None Seen, 0-5 hpf    Squam Epithel, UA None Seen None Seen, 0-5 lpf     Radiology:  N/A  Electrocardiogram:  N/A     Procedure Note   N/A     Pertinent History   The following portions of the patient's history were reviewed and updated as appropriate: allergies, current medications, past family history, past medical history, past social history, past surgical history and problem list.    Patient has Previous  delivery affecting pregnancy; Polyhydramnios in third trimester, antepartum; , delivered; and Pregnant on their problem list.    Patient has a past medical history of Acute cystitis, Anemia, Candidiasis of vulva and vagina, Disease of thyroid gland, Dizziness, Hypothyroidism, Microscopic hematuria, Pharyngitis, and UTI (urinary tract infection).    Patient has a past surgical history that includes Cholecystectomy and  section, low transverse.    Patient's family history includes No Medical Problems in her father and mother.    Patient reports that she has never smoked. She has never used smokeless tobacco. She reports that she does not drink alcohol or use drugs.     Portions of this note have been dictated using voice recognition software. Any grammatical or contextual distortions are unintentional and inherent to the software.    Virgil Diaz MD  [facility], [place]

## 2021-06-08 ENCOUNTER — VIRTUAL VISIT (OUTPATIENT)
Dept: FAMILY MEDICINE | Facility: CLINIC | Age: 31
End: 2021-06-08
Payer: COMMERCIAL

## 2021-06-08 DIAGNOSIS — Z02.89 ENCOUNTER FOR COMPLETION OF FORM WITH PATIENT: Primary | ICD-10-CM

## 2021-06-08 PROCEDURE — 99213 OFFICE O/P EST LOW 20 MIN: CPT | Mod: 95 | Performed by: STUDENT IN AN ORGANIZED HEALTH CARE EDUCATION/TRAINING PROGRAM

## 2021-06-08 NOTE — LETTER
RETURN TO WORK/SCHOOL FORM    6/8/2021    Re: Laila Powell  1990      To Whom It May Concern:     Laila Powell was seen in clinic today.  Due to her medical condition, I recommend the following upcoming work accommodations:  Laila should be excused from work 6/16/2021 - 6/27/2021.  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  Laila may return to normal work hours and normal work duties with no restrictions on 8/16/2021.    Please call our clinic if you have any concerns.    Latrice Grier MD  6/8/2021 11:21 AM

## 2021-06-08 NOTE — Clinical Note
Kim, please make sure the note I have written in the chart is viewable for the patient.  Patient said she will get the letter from [employer].  Thanks!

## 2021-06-08 NOTE — PROGRESS NOTES
"Family Medicine Video Visit Note  Laila is being evaluated via a billable video visit.           Video Visit Consent     Patient was verbally read the following and verbal consent was obtained.  \"Video visits are billed at different rates depending on your insurance coverage. During this emergency period, for some insurers they may be billed the same as an in-person visit.  Please reach out to your insurance provider with any questions.  If during the course of the call the physician/provider feels a video visit is not appropriate, you will not be charged for this service.\"     (Name person giving consent:  Patient   Date verbal consent given:  6/8/2021  Time verbal consent given:  8:44 AM)    Patient would like the video invitation sent by: Send to e-mail at: [email or web address]       Chief Complaint   Patient presents with     Letter for School/Work     restrictions for work- other was not wrriten well- is having surgery letter to say off from 6/16/21 surgery is on 6/17/21 and a week off and there after light office duty work for 5 hours a day from home     Medication Reconciliation     completed               HPI     Video Start Time: 9:02 AM    Laila presents to clinic today for the following health issues:    Requests work note  6/16/21 flying for surgery in [place]  6/17/21 surgery: Revision of tummy tuck for seroma (original surgery in [place])  Previously work letter said requesting 8 weeks off  Requests new work letter    Requests: time off 6/16-6/27, then light 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 4 days a week for duration of 8 weeks  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  Work place has agreed to allow work from home during post-op recovery  In agreement with return to work without restrictions Aug 16th      Current Outpatient Medications   Medication Sig Dispense Refill     omeprazole (PRILOSEC) 20 MG DR capsule Take 1 capsule (20 mg) by mouth 2 times daily " "180 capsule 3     ondansetron (ZOFRAN-ODT) 4 MG ODT tab Take 1 tablet (4 mg) by mouth every 8 hours as needed for nausea 30 tablet 3     phentermine (ADIPEX-P) 15 MG capsule Take 1 capsule (15 mg) by mouth every morning 30 capsule 0     SYNTHROID 112 MCG tablet Take 1 tablet (112 mcg) by mouth daily 90 tablet 3     vitamin D3 (CHOLECALCIFEROL) 50 mcg (2000 units) tablet Take 1 tablet (50 mcg) by mouth daily 90 tablet 3     Elastic Bandages & Supports (ACE ABDOMEN SURGICAL BINDER) MISC 1 Units daily 1 each 0     Allergies   Allergen Reactions     Ciprofloxacin      Levothyroxine Swelling              Review of Systems:     Brief 6-point ROS negative         Physical Exam:     [facility] 05/31/2021   Estimated body mass index is 29.49 kg/m  as calculated from the following:    Height as of 4/20/21: 1.499 m (4' 11\").    Weight as of 6/2/21: 66.2 kg (146 lb).    GENERAL: Healthy, alert and no distress  EYES: Eyes grossly normal to inspection.  No discharge or erythema, or obvious scleral/conjunctival abnormalities.  RESP: No audible wheeze, cough, or visible cyanosis.  No visible retractions or increased work of breathing.    SKIN: Visible skin clear. No significant rash, abnormal pigmentation or lesions.  NEURO: Cranial nerves grossly intact.  Mentation and speech appropriate for age.  PSYCH: Mentation appears normal, affect normal/bright, judgement and insight intact, normal speech and appearance well-groomed.        Assessment and Plan     Laila was seen today for letter for school/work and medication reconciliation.    Diagnoses and all orders for this visit:    Encounter for completion of form with patient    Work note written, in chart; patient will obtain work note through [employer].    Video-Visit Details    Type of service:  Video Visit    Video End Time (time video stopped): 9:12 AM    Originating Location (pt. Location): Home    Distant Location (provider location):  [facility] Platform " used for Video Visit: Joseph Grier MD  I precepted today with He Herman MD    Preceptor Attestation:   Patient seen, evaluated and discussed with the resident. I have verified the content of the note, which accurately reflects my assessment of the patient and the plan of care.  Supervising Physician:He Herman MD  [facility]

## 2021-06-08 NOTE — PROGRESS NOTES
Provider wore a mask and face shield during this visit.     Subjective:   Laila Powell is a 29 y.o. female  Roomed by: Angela SIDHU CMA    Refills needed? No    Do you have any forms that need to be filled out? No      Chief Complaint   Patient presents with     Urinary Frequency     x1wk, urine feels warmer than usual   Admits to 1 week of urinary frequency, urgency, voiding in small amounts, lower pelvic pressure and dysuria, but denies fever, chills or flank pain.  States that in February her initial urinalysis noted that a urine culture was not indicated but a UC was sent anyway. The UC grew > 100,000 of E. Coli and was pansensitive.  Says that she only has a OB/GYN and not a primary care provider.  Patient said that she was sent to see a urologist a couple of years ago but that she chickened out of the visit when she was told that they needed to put a camera into her bladder.  Review of Systems  See HPI for ROS, otherwise balance of other systems negative    Allergies   Allergen Reactions     Levothyroxine        Current Outpatient Medications:      SYNTHROID 88 mcg tablet, Take 80 mcg by mouth once daily., Disp: , Rfl: 0     acetaminophen (TYLENOL) 500 MG tablet, Take 500 mg by mouth every 6 (six) hours as needed for pain., Disp: , Rfl:      ibuprofen (ADVIL,MOTRIN) 800 MG tablet, Take 1 tablet (800 mg total) by mouth every 8 (eight) hours., Disp: 45 tablet, Rfl: 0  Patient Active Problem List   Diagnosis     Previous  delivery affecting pregnancy     Polyhydramnios in third trimester, antepartum     , delivered     Pregnant     Past Medical History:   Diagnosis Date     Acute cystitis      Anemia      Candidiasis of vulva and vagina      Disease of thyroid gland      Dizziness      Hypothyroidism      Microscopic hematuria      Pharyngitis      UTI (urinary tract infection)     - if none on file, see Problem List    Objective:     Vitals:    20 0814   BP: 113/74   Patient Site: Right Arm   Patient  Position: Sitting   Cuff Size: Adult Regular   Pulse: 86   Resp: 16   Temp: 97.9  F (36.6  C)   TempSrc: Oral   SpO2: 98%   Weight: 150 lb 2 oz (68.1 kg)   Gen -Patient in no apparent distress  MS - Negative CVA tenderness bilaterally    Results for orders placed or performed in visit on 05/18/20   Urinalysis-UC if Indicated   Result Value Ref Range    Color, UA Yellow Colorless, Yellow, Straw, Light Yellow    Clarity, UA Clear Clear    Glucose, UA Negative Negative    Bilirubin, UA Negative Negative    Ketones, UA Negative Negative    Specific Gravity, UA 1.025 1.005 - 1.030    Blood, UA Small (!) Negative    pH, UA 5.5 5.0 - 8.0    Protein, UA Negative Negative mg/dL    Urobilinogen, UA 0.2 E.U./dL 0.2 E.U./dL, 1.0 E.U./dL    Nitrite, UA Negative Negative    Leukocytes, UA Negative Negative    Bacteria, UA Few (!) None Seen hpf    RBC, UA 0-2 None Seen, 0-2 hpf    WBC, UA 0-5 None Seen, 0-5 hpf    Squam Epithel, UA 5-10 (!) None Seen, 0-5 lpf   UC not indicated   Lab result discussed on day of visit.     Assessment - Plan   Medical Decision Making -29-year-old woman with a recent history of UTI in February presents with 1 week of urinary frequency, urgency, voiding small amounts, suprapubic pressure and dysuria without fever, chills or flank pain.  In February patient's initial urinalysis did not indicate did not reflex to a urine culture.  Urine culture was sent anyway and it grew greater than 100,000 of E. coli which was pansensitive.  On today's exam she is afebrile and vital signs are stable.  She has no CVA tenderness.   Because of her lack of systemic symptoms acute pyelonephritis is highly unlikely. Will order a urine culture today.  Patient will be treated with nitrofurantoin x 5 days.  Recommended the patient follow-up with the urology group that she was supposed to see a few years ago.  Patient was told that a provider would call her only if an antibiotic change was needed.  See patient instructions.      1. Acute cystitis without hematuria  - nitrofurantoin, macrocrystal-monohydrate, (MACROBID) 100 MG capsule; Take 1 capsule (100 mg total) by mouth 2 (two) times a day for 5 days.  Dispense: 10 capsule; Refill: 0    2. Urinary frequency  - Urinalysis-UC if Indicated    3. History of bladder infections  - Culture, Urine      At the conclusion of the encounter, assessment and plan were discussed.   All questions were answered.   The patient or guardian acknowledged understanding and was involved in the decision making regarding the overall care plan.    There are no Patient Instructions on file for this visit.

## 2021-06-09 ENCOUNTER — TELEPHONE (OUTPATIENT)
Dept: FAMILY MEDICINE | Facility: CLINIC | Age: 31
End: 2021-06-09

## 2021-06-09 ENCOUNTER — MYC MEDICAL ADVICE (OUTPATIENT)
Dept: FAMILY MEDICINE | Facility: CLINIC | Age: 31
End: 2021-06-09

## 2021-06-09 NOTE — TELEPHONE ENCOUNTER
[facility] phone call message- general phone call:    Reason for call: Patient states new work letter is needed. She states her employer is making it more complicated then what it needs to be. She states if letter can states that she needs to be off of work until 8/16/21 because they are expecting her to be back to work sooner than this date. Please call and advise.     Return call needed: Yes    OK to leave a message on voice mail? Yes    Primary language: English      needed? No    Call taken on June 9, 2021 at 11:54 AM by Alba Mccloud

## 2021-06-09 NOTE — LETTER
RETURN TO WORK/SCHOOL FORM    6/9/2021    Re: Laila Powell  1990      To Whom It May Concern:     Laila Powell was seen in clinic 6/8/2021.  Due to her medical condition, she should remain off of work 6/16/2021 - 8/15/2021.  She may return to work without restrictions 8/16/2021.    Latrice Grier MD  6/9/2021 6:17 PM

## 2021-06-09 NOTE — TELEPHONE ENCOUNTER
Called patient to advise of two business days for a response per clinic policy. Patient verbalized understanding and stated she is now requesting to be off work until 08/16/21. Patient asked if a different physician could provide the letter, advised the letter should be from her PCP or  who saw her most recently for this concern. Patient stated her workplace denied previous letter due to it saying she was able to return to regular work duties rather than specify work from home. Patient would now like the letter to only state she is to remain out of work until 08/16/2021. Michael FRIAS

## 2021-06-10 NOTE — TELEPHONE ENCOUNTER
Called left detailed message for Laila, letter is ready for . Will place up front for . Alla FRIAS

## 2021-06-10 NOTE — TELEPHONE ENCOUNTER
Printed and faxed letter to pt provided number. Called pt and informed it was faxed to the number.

## 2021-06-10 NOTE — TELEPHONE ENCOUNTER
Patient called, asked to read what the letter said, read the letter to her, she would like letter to be fax to her work at . She asked when and how long it will take so she can notify her work told her that I don't have an answer for that, we take a message and someone else faxes it so won't be able to give her a time frame of when it will be fax. Please call and advise.

## 2021-06-11 NOTE — PROGRESS NOTES
Called patient to see if she was able to get the letter via [employer]. Patient stated no but someone had faxed it for her already to her work as she called back after her video visit.

## 2021-06-15 NOTE — PROCEDURES
IR Procedure Note    Physician: Bashir Magana MD    Procedure: Abscessogram and sclerotherapy treatment.    Findings/Plan:  Abscessogram shows small residual fluid cavity in the abdominal wall.  Total of 5 ml of ethanal alcohol was instilled for sclerotherapy of the pocket.     OK to discharge in 1 hour.  Patient is to follow up early  next week with possible additional sclerotherapy treatment.

## 2021-06-15 NOTE — PROCEDURES
[facility]    Procedure: Abdominal drain placement    Date/Time: 3/1/2021 10:51 AM  Performed by: Sigifredo Kay MD  Authorized by: Sigifredo Kay MD       Universal Protocol    Site marked: NA    Prior images obtained and reviewed: Yes    Required items: required blood products, implants, devices, and special equipment available    Patient identity confirmed: verbally with patient, arm band, provided demographic data and hospital-assigned identification number    Reevaluation: NA - No sedation, light sedation, or local anesthesia    Confirmation checklist: patient's identity using two indicators, relevant allergies, procedure was appropriate and matched the consent or emergent situation and correct equipment/implants were available    Time out: Immediately prior to procedure a time out was called to verify the correct patient, procedure, equipment, support staff and site/side marked as required    Universal Protocol: Joint Commission Universal Protocol was followed    Preparation: Patient was prepped and draped in the usual sterile fashion    ESBL (mL): 5    Anesthesia    Local anesthesia used?: Yes    Anesthesia: local infiltration    Local anesthetic: lidocaine 1% without epinephrine    Anesthetic total (mL): 5    Sedation  Patient sedation: No    Post-procedure    Description of procedure: Thin, pancake shaped collection in the supraumbicial region.  No fluid deep to the lower incision.  Serosanginous fluid aspirated (approx 15 mL total).  Placement of a 8 Fr pigtail drain.    Patient tolerance: patient tolerated the procedure well with no immediate complications   Length of time physician present for 1:1 monitoring during sedation: 0

## 2021-06-15 NOTE — PRE-PROCEDURE
Procedure Name: abscess drain check  Date/Time: 3/11/2021 8:50 AM    Verbal consent obtained?: Yes  Written consent obtained?: Yes  Risks and benefits: Risks, benefits and alternatives were discussed  Consent given by: patient  ASA Class: Class 1- healthy patient  Mallampati: Grade 2- soft palate, base of uvula, tonsillar pillars, and portion of posterior pharyngeal wall visible  Patient states understanding of procedure being performed: Yes  Patient's understanding of procedure matches consent: Yes  Procedure consent matches procedure scheduled: Yes  Appropriately NPO: yes  Lungs: lungs clear with good breath sounds bilaterally  Heart: normal heart sounds and rate  History & Physical reviewed: History and physical reviewed and no updates needed  Statement of review: I have reviewed the lab findings, diagnostic data, medications, and the plan for sedation

## 2021-06-15 NOTE — PROCEDURES
Interventional Radiology Post-Procedure Note   [facility]  ?   Brief Procedure Note:   Patient name: Laila Powell  Pt MRN:[id number]   Date of procedure: 3/9/2021     Procedure(s): Abscessogram and sclerotherapy  Sedation method: Moderate sedation was employed. The patient was monitored by an interventional radiology nurse at all times throughout the procedure under my direct guidance.  Pre Procedure Diagnosis: Abdominal seroma  Post Procedure Diagnosis: Same  Indications: Symptomatic seroma, follow up drain placement   ?   Attending: Marlon Garcia M.D.  Specimen(s) removed: None   Additional studies ordered: None  Drains: 8 Fr pigtail drain  Estimated Blood Loss: Minimal  Complications: None  Vascular closure method: N/A    Findings/Notes/Comments: Residual seroma still seen, volume of 6 ml. Sclerotherapy performed with 6cc of dehydrated ethanol. No flushing. Return to IR department on 3/11/21 for follow up.   ?   Please see dictation in PACS or under the Imaging tab in [employer] for detailed procedure note.     Marlon Garcia M.D.   Vascular and Interventional Radiology   Pager: [phone]   After Hours / Scheduling: [phone]     3/9/2021  8:26 AM

## 2021-06-15 NOTE — PROCEDURES
[facility]    Procedure: IR Procedure Note    Date/Time: 3/9/2021 8:28 AM  Performed by: Marlon Garcia MD  Authorized by: Marlon Garcia MD       Universal Protocol    Site marked: NA    Prior images obtained and reviewed: Yes    Required items: required blood products, implants, devices, and special equipment available    Patient identity confirmed: verbally with patient, arm band, provided demographic data and hospital-assigned identification number    Reevaluation: NA - No sedation, light sedation, or local anesthesia    Confirmation checklist: patient's identity using two indicators, relevant allergies, procedure was appropriate and matched the consent or emergent situation and correct equipment/implants were available    Time out: Immediately prior to procedure a time out was called to verify the correct patient, procedure, equipment, support staff and site/side marked as required    Universal Protocol: Joint Commission Universal Protocol was followed    Preparation: Patient was prepped and draped in the usual sterile fashion    Anesthesia  Local anesthesia used?: No    Sedation  Patient sedation: No  Specimens: none  Complications: None  Condition: Stable    Post-procedure   Length of time physician present for 1:1 monitoring during sedation: 0

## 2021-06-15 NOTE — PRE-PROCEDURE
Procedure Name: drain placement   Date/Time: 3/1/2021 9:45 AM  Written consent obtained?: Yes  Risks and benefits: Risks, benefits and alternatives were discussed  Consent given by: patient  Expected level of sedation: moderate  ASA Class: Class 2- mild systemic disease, no acute problems, no functional limitations  Mallampati: Grade 3- soft palate visible, posterior pharyngeal wall not visible  Patient states understanding of procedure being performed: Yes  Patient's understanding of procedure matches consent: Yes  Procedure consent matches procedure scheduled: Yes  Appropriately NPO: yes  Lungs: lungs clear with good breath sounds bilaterally  Heart: normal heart sounds and rate  History & Physical reviewed: Abbreviated history and physical done prior to moderate sedation  Statement of review: I have reviewed the lab findings, diagnostic data, medications, and the plan for sedation

## 2021-06-16 NOTE — PROGRESS NOTES
"Patient [place] [facility] [place] and \"loss of 2 mucous plugs.\" Patient brought to triage room, placed FHT monitor and [place]. Vital signs obtained. Patient stated contractions started around 1500 at home and are 5 minutes apart.  "

## 2021-06-16 NOTE — ANESTHESIA PROCEDURE NOTES
Epidural Block    Patient location during procedure: OB  Time Called: 2/26/2018 11:16 AM  Reason for Block:labor epidural  Staffing:  Performing  Anesthesiologist: JAYDEN CHAVEZ  Preanesthetic Checklist  Completed: patient identified, risks, benefits, and alternatives discussed, timeout performed, consent obtained, at patient's request, airway assessed, oxygen available, suction available, emergency drugs available and hand hygiene performed  Procedure  Patient position: sitting  Prep: ChloraPrep and site prepped and draped  Patient monitoring: continuous pulse oximetry, heart rate and blood pressure  Approach: midline  Location: L3-L4  Injection technique: ROSA ELENA saline  Number of Attempts:1  Needle  Needle type: Oracio   Needle gauge: 18 G     Catheter in Space: 5  Assessment  Sensory level:  No complications

## 2021-06-16 NOTE — PROGRESS NOTES
Dr. Cardenas returned call. Discussed findings to her and she gave orders to watch the patient for 1 hour, have her walk and recheck her cervix after 1 hour. Discussed plan with on-call resident, Dr. Mendoza, and he is agreeable. Will discuss plan with patient. Judy Machado 02/18/18 6:30 PM

## 2021-06-16 NOTE — ANESTHESIA POSTPROCEDURE EVALUATION
Patient: Laila Powell  * No procedures listed *  Anesthesia type: No value filed.    Patient location: Labor and Delivery  Last vitals:   Vitals:    02/26/18 1915   BP: 97/54   Pulse: (!) 105   Resp:    Temp:    SpO2:      Post vital signs: stable  Level of consciousness: awake and responds to simple questions  Post-anesthesia pain: pain controlled  Post-anesthesia nausea and vomiting: no  Pulmonary: unassisted, return to baseline  Cardiovascular: stable and blood pressure at baseline  Hydration: adequate  Anesthetic events: no    QCDR Measures:  ASA# 11 - [place]-op Cardiac Arrest: ASA11B - Patient did NOT experience unanticipated cardiac arrest  ASA# 12 - [place]anna-op Mortality Rate: ASA12B - Patient did NOT die  ASA# 13 - PACU Re-Intubation Rate: ASA13B - Patient did NOT require a new airway mgmt  ASA# 10 - Composite Anes Safety: ASA10A - No serious adverse event    Additional Notes:

## 2021-06-16 NOTE — PROGRESS NOTES
Call returned for Dr. Cradenas. Discussed patients wishes and she is agreeable to to send the patient home. Will discuss with patient.

## 2021-06-16 NOTE — PRE-PROCEDURE
Date/Time: 3/31/2021 8:17 AM  Written consent obtained?: Yes  Risks and benefits: Risks, benefits and alternatives were discussed  Consent given by: patient  Expected level of sedation: moderate  ASA Class: Class 2- mild systemic disease, no acute problems, no functional limitations  Mallampati: Grade 1- soft palate, uvula, tonsillar pillars, and posterior pharyngeal wall visible and Grade 2- soft palate, base of uvula, tonsillar pillars, and portion of posterior pharyngeal wall visible  Patient states understanding of procedure being performed: Yes  Patient's understanding of procedure matches consent: Yes  Appropriately NPO: yes  Lungs: lungs clear with good breath sounds bilaterally  Heart: normal heart sounds and rate  History & Physical reviewed: History and physical reviewed and no updates needed  Statement of review: I have reviewed the lab findings, diagnostic data, medications, and the plan for sedation

## 2021-06-16 NOTE — PROGRESS NOTES
Patient reported that she would rather go home and not be rechecked. Paged Dr. Cardenas to discuss patients requests.

## 2021-06-16 NOTE — PROGRESS NOTES
Gave patient AVS. Discussed early labor instructions, patient verbalized understanding. Patient ambulated to elevator without difficulty. Discharge complete. Judy Machado 02/18/18 7:06 PM

## 2021-06-16 NOTE — PROGRESS NOTES
HPI: Laila Powell is a 30 y.o. female referred to see me by Jessica Welch MD for evaluation of a postoperative seroma.  She underwent a panniculectomy/abdominoplasty in October of last year.  Following that procedure, she reports developing a hematoma or seroma just above her umbilicus in the midline.  This was initially treated with drain placement and sclerotherapy starting in March, with multiple aspirations and injection of ethanol to help collapse the pocket; she notes however that during there most recent effort 3 weeks ago they only got 6 to 8 cc out and ultimately ended up aspirating but not placing a drain.  She is still having some discomfort at this area and there is still a lump in her soft tissue, though she notes it has gotten flatter and somewhat softer.          Allergies:Ciprofloxacin and Levothyroxine    Past Medical History:   Diagnosis Date     Acute cystitis      Anemia      Candidiasis of vulva and vagina      Disease of thyroid gland      Dizziness      Hypothyroidism      Microscopic hematuria      Pharyngitis      UTI (urinary tract infection)        Past Surgical History:   Procedure Laterality Date      SECTION, LOW TRANSVERSE       CHOLECYSTECTOMY       COMBINED AUGMENTATION MAMMAPLASTY AND ABDOMINOPLASTY       IR ABSCESS DRAIN EXCHANGE  3/9/2021     IR ABSCESS DRAIN INSERTION  3/1/2021     IR ABSCESS DRAIN INSERTION  3/31/2021     tummy tuck         CURRENT MEDS:    Current Outpatient Medications:      acetaminophen (TYLENOL) 500 MG tablet, Take 500 mg by mouth every 6 (six) hours as needed for pain., Disp: , Rfl:      cephalexin (KEFLEX) 500 MG capsule, TAKE 1 CAPSULE BY MOUTH FOUR TIMES A DAY, Disp: , Rfl:      cholecalciferol, vitamin D3, 50 mcg (2,000 unit) Tab, Take 50 mcg by mouth., Disp: , Rfl:      ergocalciferol (ERGOCALCIFEROL) 1,250 mcg (50,000 unit) capsule, , Disp: , Rfl:      famotidine (PEPCID) 20 MG tablet, , Disp: , Rfl:      ibuprofen (ADVIL,MOTRIN) 800 MG  "tablet, Take 1 tablet (800 mg total) by mouth every 8 (eight) hours., Disp: 45 tablet, Rfl: 0     medroxyPROGESTERone (PROVERA) 10 MG tablet, , Disp: , Rfl:      omeprazole (PRILOSEC) 20 MG capsule, Take 20 mg by mouth daily before breakfast., Disp: , Rfl:      SYNTHROID 88 mcg tablet, Take 80 mcg by mouth once daily., Disp: , Rfl: 0     diazePAM (VALIUM) 5 MG tablet, TAKE 1 TABLET BY MOUTH EVERY 6 HOURS AS NEEDED FOR INSOMNI OR DISCOMFORT, Disp: , Rfl:      hydrOXYzine HCL (ATARAX) 25 MG tablet, , Disp: , Rfl:      norgestimate-ethinyl estradioL (ORTHO TRI-CYCLEN) 0.18/0.215/0.25 mg-35 mcg (28) tablet, Take 1 tablet by mouth., Disp: , Rfl:      ondansetron (ZOFRAN) 4 MG tablet, Take 4 mg by mouth every 8 (eight) hours as needed for nausea., Disp: , Rfl:      ondansetron (ZOFRAN-ODT) 4 MG disintegrating tablet, Take 4 mg by mouth., Disp: , Rfl:      oxyCODONE-acetaminophen (PERCOCET/ENDOCET) 5-325 mg per tablet, Take 1 tablet by mouth every 6 (six) hours as needed., Disp: , Rfl:      predniSONE (DELTASONE) 20 MG tablet, , Disp: , Rfl:     Family History   Problem Relation Age of Onset     No Medical Problems Mother      No Medical Problems Father         reports that she has never smoked. She has never used smokeless tobacco. She reports that she does not drink alcohol or use drugs.    Review of Systems:  The 10 point review of systems  is within normal limits except for as mentioned above in the HPI    /70 (Patient Site: Right Arm, Patient Position: Sitting, Cuff Size: Adult Regular)   Ht 4' 11\" (1.499 m)   Wt 147 lb (66.7 kg)   BMI 29.69 kg/m    Body mass index is 29.69 kg/m .    EXAM:  General : Alert, cooperative, appears stated age   Abdomen: Area of firm subcutaneous tissue superior to the umbilicus measures approximately 10 cm in diameter.  No palpable underlying fluid collection.    LABS:  Lab Results   Component Value Date    WBC 8.0 12/18/2018    HGB 14.2 12/18/2018    HCT 41.8 12/18/2018    MCV " 86 12/18/2018     12/18/2018     INR/Prothrombin Time      Lab Results   Component Value Date    ALT 44 09/10/2020    AST 40 09/10/2020    ALKPHOS 55 09/10/2020    BILITOT 0.4 09/10/2020           Assessment/Plan:   1. Class 1 obesity due to excess calories without serious comorbidity with body mass index (BMI) of 31.0 to 31.9 in adult        Laila Powell is a 30 y.o. female with signs and symptoms consistent with a small resolving seroma.  I have explained the pathophysiology of seroma formation, treatment and resorption in detail as well as the surgical versus non-operative management strategies.      Given the small size of this seroma, I think the majority of what she is feeling is irritated soft tissues surrounding the fluid collections and not the actual seromas themselves.  With the amount of fluid they are able to aspirate each time, this would correspond to only a 2 to 3 cm seroma, which is far smaller than the area she is complaining about.  I do not think any type of surgical intervention is warranted at this point given the small size,    The limited benefit she would gain from it, and the potential to create additional seromas or inflamed tissue.  My best advice to her at this point is to give it time, and be aware that she may always have some firmness in this area due to her previous surgery, resolved seroma, and scar tissue formation.    She also expressed during our discussion concern regarding weight loss and wished to have referral made for weight management program.    15 minutes spent on the date of the encounter doing chart review, review of test results, patient visit and documentation      Jose J Worthington MD, FACS  Office: [phone]  [facility]   General and Bariatric Surgery

## 2021-06-16 NOTE — ANESTHESIA PREPROCEDURE EVALUATION
Anesthesia Evaluation      Patient summary reviewed   No history of anesthetic complications     Airway    Pulmonary    (-) not a smoker                         Cardiovascular - negative ROS  (-) hypertension   Neuro/Psych - negative ROS     Endo/Other    (+) hypothyroidism, pregnant     GI/Hepatic/Renal    (+) GERD,             Dental                         Anesthesia Plan  Planned anesthetic: epidural    ASA 2   Induction: intravenous   Anesthetic plan and risks discussed with: patient  Anesthesia plan special considerations: rapid sequence induction, increased risk of difficult airway, antiemetics,   Post-op plan: routine recovery

## 2021-06-18 NOTE — PATIENT INSTRUCTIONS - HE
"Patient Instructions by Shirley Ahn MD at 5/18/2020  8:30 AM     Author: Shirley Ahn MD Service: -- Author Type: Physician    Filed: 5/18/2020  9:32 AM Encounter Date: 5/18/2020 Status: Addendum    : Shirley Ahn MD (Physician)    Related Notes: Original Note by Shirley Ahn MD (Physician) filed at 5/18/2020  9:31 AM       1. Drink plenty of liquids  2. Urinate frequently - try not to hold your urine if you have to go  3. May alternate tylenol every 6 hours with ibuprofen every 6 hours as needed for fever or pain  4.  After you have finished with your antibiotics, follow-up at the urology group that he was supposed to go to several years ago  5. If your symptoms are getting worse after 48 hours of antibiotics or you have any questions, call the clinic number - it's answered 24/7               Patient Education     Bladder Infection, Female (Adult)    Urine is normally doesn't have any bacteria in it. But bacteria can get into the urinary tract from the skin around the rectum. Or they can travel in the blood from elsewhere in the body. Once they are in your urinary tract, they can cause infection in the urethra (urethritis), the bladder (cystitis), or the kidneys (pyelonephritis).  The most common place for an infection is in the bladder. This is called a bladder infection. This is one of the most common infections in women. Most bladder infections are easily treated. They are not serious unless the infection spreads to the kidney.  The phrases \"bladder infection,\" \"UTI,\" and \"cystitis\" are often used to describe the same thing. But they are not always the same. Cystitis is an inflammation of the bladder. The most common cause of cystitis is an infection.  Symptoms  The infection causes inflammation in the urethra and bladder. This causes many of the symptoms. The most common symptoms of a bladder infection are:    Pain or burning when urinating    Having to urinate more often than " usual    Urgent need to urinate    Only a small amount of urine comes out    Blood in urine    Abdominal discomfort. This is usually in the lower abdomen above the pubic bone.    Cloudy urine    Strong- or bad-smelling urine    Unable to urinate (urinary retention)    Unable to hold urine in (urinary incontinence)    Fever    Loss of appetite    Confusion (in older adults)  Causes  Bladder infections are not contagious. You can't get one from someone else, from a toilet seat, or from sharing a bath.  The most common cause of bladder infections is bacteria from the bowels. The bacteria get onto the skin around the opening of the urethra. From there, they can get into the urine and travel up to the bladder, causing inflammation and infection. This usually happens because of:    Wiping improperly after urinating. Always wipe from front to back.    Bowel incontinence    Pregnancy    Procedures such as having a catheter inserted    Older age    Not emptying your bladder. This can allow bacteria a chance to grow in your urine.    Dehydration    Constipation    Sex    Use of a diaphragm for birth control   Treatment  Bladder infections are diagnosed by a urine test. They are treated with antibiotics and usually clear up quickly without complications. Treatment helps prevent a more serious kidney infection.  Medicines  Medicines can help in the treatment of a bladder infection:    Take antibiotics until they are used up, even if you feel better. It is important to finish them to make sure the infection has cleared.    You can use acetaminophen or ibuprofen for pain, fever, or discomfort, unless another medicine was prescribed. If you have chronic liver or kidney disease, talk with your healthcare provider before using these medicines. Also talk with your provider if you've ever had a stomach ulcer or gastrointestinal bleeding, or are taking blood-thinner medicines.    If you are given phenazopydridine to reduce burning with  urination, it will cause your urine to become a bright orange color. This can stain clothing.  Care and prevention  These self-care steps can help prevent future infections:    Drink plenty of fluids to prevent dehydration and flush out your bladder. Do this unless you must restrict fluids for other health reasons, or your doctor told you not to.    Proper cleaning after going to the bathroom is important. Wipe from front to back after using the toilet to prevent the spread of bacteria.    Urinate more often. Don't try to hold urine in for a long time.    Wear loose-fitting clothes and cotton underwear. Avoid tight-fitting pants.    Improve your diet and prevent constipation. Eat more fresh fruit and vegetables, and fiber, and less junk and fatty foods.    Avoid sex until your symptoms are gone.    Avoid caffeine, alcohol, and spicy foods. These can irritate your bladder.    Urinate right after intercourse to flush out your bladder.    If you use birth control pills and have frequent bladder infections, discuss it with your doctor.  Follow-up care  Call your healthcare provider if all symptoms are not gone after 3 days of treatment. This is especially important if you have repeat infections.  If a culture was done, you will be told if your treatment needs to be changed. If directed, you can call to find out the results.  If X-rays were done, you will be told if the results will affect your treatment.  Call 911  Call 911 if any of the following occur:    Trouble breathing    Hard to wake up or confusion    Fainting or loss of consciousness    Rapid heart rate  When to seek medical advice  Call your healthcare provider right away if any of these occur:    Fever of 100.4 F (38.0 C) or higher, or as directed by your healthcare provider    Symptoms are not better by the third day of treatment    Back or belly (abdominal) pain that gets worse    Repeated vomiting, or unable to keep medicine down    Weakness or  dizziness    Vaginal discharge    Pain, redness, or swelling in the outer vaginal area (labia)  Date Last Reviewed: 10/1/2016    3687-8028 [employer]. [address] [place] [address], [place] [address]. All rights reserved. This information is not intended as a substitute for professional medical care. Always follow your healthcare professional's instructions.           Patient Education     Urinary Tract Infections in Women    Urinary tract infections (UTIs) are most often caused by bacteria. These bacteria enter the urinary tract. The bacteria may come from inside the body. Or they may travel from the skin outside the rectum or vagina into the urethra. Female anatomy makes it easy for bacteria from the bowel to enter a womans urinary tract, which is the most common source of UTI. This means women develop UTIs more often than men. Pain in or around the urinary tract is a common UTI symptom. But the only way to know for sure if you have a UTI for the healthcare provider to test your urine. The two tests that may be done are the urinalysis and urine culture.  Types of UTIs    Cystitis. A bladder infection (cystitis) is the most common UTI in women. You may have urgent or frequent need to pee. You may also have pain, burning when you pee, and bloody urine.    Urethritis. This is an inflamed urethra, which is the tube that carries urine from the bladder to outside the body. You may have lower stomach or back pain. You may also have urgent or frequent need to pee.    Pyelonephritis. This is a kidney infection. If not treated, it can be serious and damage your kidneys. In severe cases, you may need to stay in the hospital. You may have a fever and lower back pain.    Medicines to treat a UTI  Most UTIs are treated with antibiotics. These kill the bacteria. The length of time you need to take them depends on the type of infection. It may be as short as 3 days. If you have repeated UTIs, you may need a low-dose  antibiotic for several months. Take antibiotics exactly as directed. Dont stop taking them until all of the medicine is gone. If you stop taking the antibiotic too soon, the infection may not go away. You may also develop a resistance to the antibiotic. This can make it much harder to treat.  Lifestyle changes to treat and prevent UTIs  The lifestyle changes below will help get rid of your UTI. They may also help prevent future UTIs.    Drink plenty of fluids. This includes water, juice, or other caffeine-free drinks. Fluids help flush bacteria out of your body.    Empty your bladder. Always empty your bladder when you feel the urge to pee. And always pee before going to sleep. Urine that stays in your bladder can lead to infection. Try to pee before and after sex as well.    Practice good personal hygiene. Wipe yourself from front to back after using the toilet. This helps keep bacteria from getting into the urethra.    Use condoms during sex. These help prevent UTIs caused by sexually transmitted bacteria. Also don't use spermicides during sex. These can increase the risk for UTIs. Choose other forms of birth control instead. For women who tend to get UTIs after sex, a low-dose of a preventive antibiotic may be used. Be sure to discuss this option with your healthcare provider.    Follow up with your healthcare provider as directed. He or she may test to make sure the infection has cleared. If needed, more treatment may be started.  Date Last Reviewed: 7/1/2019 2000-2019 [employer]. [address] [place] [address], [place] [address]. All rights reserved. This information is not intended as a substitute for professional medical care. Always follow your healthcare professional's instructions.

## 2021-06-18 NOTE — PATIENT INSTRUCTIONS - HE
Patient Instructions by Virgil Diaz MD at 2/27/2020  1:50 PM     Author: Virgil Diaz MD Service: -- Author Type: Physician    Filed: 2/27/2020  2:38 PM Encounter Date: 2/27/2020 Status: Addendum    : Virgil Diaz MD (Physician)    Related Notes: Original Note by Virgil Diaz MD (Physician) filed at 2/27/2020  2:38 PM       -Your urinalysis shows no signs of bladder infection at this time.  -A urine culture is in process to definitively rule out a bladder infection.  Appropriate antibiotic therapy will be started if this is positive.  -Testing for yeast infection, bacterial vaginosis, trichomonas, Gonorrhea, and Chlamydia is in process.  Appropriate therapy will be prescribed if any of these tests are positive.  -Recommend reevaluation by your OB/GYN or Primary Care Provider if your symptoms persist.  Patient Education     Dysuria with Uncertain Cause (Adult)    The urethra is the tube that allows urine to pass out of the body. In a woman, the urethra is the opening above the vagina. In men, the urethra is the opening on the tip of the penis. Dysuria is the feeling of pain or burning in the urethra when passing urine.  Dysuria can be caused by anything that irritates or inflames the urethra. An infection or chemical irritation can cause this reaction. A bladder infection is the most common cause of dysuria in adults. A urine test can diagnose this. A bladder infection needs antibiotic treatment.  Soaps, lotions, colognes and feminine hygiene products can cause dysuria. So can birth control jellies, creams, and foams. It will go away 1 to 3 days after using these irritants.  Sexually transmitted diseases (STDs) such as chlamydia or gonorrhea can cause dysuria. Your healthcare provider may take a culture sample. Your provider may start you on antibiotic medicine before the culture test returns.  In women who have gone through menopause, dysuria can be from dryness in the lining of  the urethra. This can be treated with hormones. Dysuria becomes long-term (chronic) when it lasts for weeks or months. You may need to see a specialist (urologist) to diagnose and treat chronic dysuria.  Home care  These home care tips may help:    Don't use any chemicals or products that you think may be causing your symptoms.    If you were given a prescription medicine, take as directed. Be sure to take it until it is all used up.    If a culture was taken, don't have sex until you have been told that it is negative. This means you don't have an infection. Then follow your healthcare provider's advice to treat your condition.  If a culture was done and it is positive:    Both you and your sexual partner may need to be treated. This is true even if your partner has no symptoms.    Contact your healthcare provider or go to an urgent care clinic or the public health department to be looked at and treated.    Don't have sex until both you and your partner(s) have finished all antibiotics and your healthcare provider says you are no longer contagious.    Learn about and use safe sex practices. The safest sex is with a partner who has tested negative and only has sex with you. Condoms can prevent STDs from spreading, but they aren't a guarantee.  Follow-up care  Follow up with your healthcare provider, or as advised. If a culture was taken, you may call as directed for the results. If you have an STD, follow up with your provider or the public health department for a complete STD screening, including HIV testing. For more information, contact CDC-INFO at 267-612-5736.  When to seek medical advice  Call your healthcare provider right away if any of these occur:    You aren't better after 3 days of treatment    Fever of 100.4 F (38 C) or higher, or as directed by your healthcare provider    Back or belly pain that gets worse    You can't urinate because of pain    New discharge from the urethra, vagina, or  penis    Painful sores on the penis    Rash or joint pain    Painful lumps (lymph nodes) in the groin    Testicle pain or swelling of the scrotum  Date Last Reviewed: 11/1/2016 2000-2017 [employer]. [address] [place] [address], [place] [address]. All rights reserved. This information is not intended as a substitute for professional medical care. Always follow your healthcare professional's instructions.

## 2021-07-01 NOTE — CONSULTS
Consults by Lane Ronquillo MD at 3/31/2021  8:00 AM     Author: Lane Ronquillo MD Service: Interventional Radiology Author Type: Physician    Filed: 3/31/2021  8:21 AM Date of Service: 3/31/2021  8:00 AM Status: Signed    : Lane Ronquillo MD (Physician)         Interventional Radiology - Consultation Note:  Inpatient - [facility]: Interventional Radiology   [phone]  3/31/2021    Reason for Consult:  Recurrent abdominal wall seroma.     HPI:  Laila Powell is a 30 y.o. old female with a recurrent abdominal wall seroma s/p a surgical procedure in [place].  The patient is quite knowledgeable about this and has been in contact with the surgeon.  Fluid aspiration is requested with drain placement if the overall volume is 120 mL. Currently nothing to suggest that the fluid is infected.      REVIEW OF SYSTEMS:  A comprehensive 10-point review of systems was performed. All systems were reviewed and negative with exception to those reported in the HPI.     PAST MEDICAL HISTORY:  Past Medical History:   Diagnosis Date   ? Acute cystitis    ? Anemia    ? Candidiasis of vulva and vagina    ? Disease of thyroid gland    ? Dizziness    ? Hypothyroidism    ? Microscopic hematuria    ? Pharyngitis    ? UTI (urinary tract infection)        PAST SURGICAL HISTORY:  Past Surgical History:   Procedure Laterality Date   ?  SECTION, LOW TRANSVERSE     ? CHOLECYSTECTOMY     ? COMBINED AUGMENTATION MAMMAPLASTY AND ABDOMINOPLASTY     ? IR ABSCESS DRAIN EXCHANGE  3/9/2021   ? IR ABSCESS DRAIN INSERTION  3/1/2021   ? tummy tuck         ALLERGIES  Ciprofloxacin and Levothyroxine    MEDICATIONS:  Current Outpatient Medications on File Prior to Encounter   Medication Sig Dispense Refill   ? acetaminophen (TYLENOL) 500 MG tablet Take 500 mg by mouth every 6 (six) hours as needed for pain.     ? ibuprofen (ADVIL,MOTRIN) 800 MG tablet Take 1 tablet (800 mg total) by mouth every 8 (eight) hours. 45 tablet 0  "  ? omeprazole (PRILOSEC) 20 MG capsule Take 20 mg by mouth daily before breakfast.     ? ondansetron (ZOFRAN) 4 MG tablet Take 4 mg by mouth every 8 (eight) hours as needed for nausea.     ? SYNTHROID 88 mcg tablet Take 80 mcg by mouth once daily.  0     No current facility-administered medications on file prior to encounter.         LABS:  INR (no units)   Date Value   09/10/2020 0.93     Hemoglobin (g/dL)   Date Value   12/18/2018 14.2     Platelets (thou/uL)   Date Value   12/18/2018 308     Creatinine (mg/dL)   Date Value   09/10/2020 0.63     Potassium (mmol/L)   Date Value   09/10/2020 4.0          EXAM:  /68   Pulse 77   Temp 97.6  F (36.4  C) (Oral)   Resp 12   Ht 4' 11\" (1.499 m)   Wt 140 lb (63.5 kg)   SpO2 99%   BMI 28.28 kg/m        ASSESSMENT:  Recurrent seroma.    PLAN:    Fluid aspiration with drain placement if volume >120mL or if fluid appears infected.    Thank you for kindly for this consultation.     Total time spent on the date of the encounter is 15 minutes, including time spent counseling the patient, performing a medically appropriate evaluation, reviewing prior medical history, ordering medications and tests, documenting clinical information in the medical record, and communication of results.    Lane Ronquillo  Interventional Radiology  [phone]           "

## 2021-07-02 DIAGNOSIS — E55.9 VITAMIN D DEFICIENCY: ICD-10-CM

## 2021-07-02 RX ORDER — CHOLECALCIFEROL (VITAMIN D3) 50 MCG
1 TABLET ORAL DAILY
Qty: 90 TABLET | Refills: 3 | Status: SHIPPED | OUTPATIENT
Start: 2021-07-02

## 2021-07-02 NOTE — H&P
H&P by Irene Ervin CNP at 3/1/2021 10:00 AM     Author: Irene Ervin CNP Service: Interventional Radiology Author Type: Nurse Practitioner    Filed: 3/1/2021  9:45 AM Date of Service: 3/1/2021 10:00 AM Status: Signed    : Irene Ervin CNP (Nurse Practitioner)         Interventional Radiology - History and Physical  3/1/2021    Procedure Requested: image guided drain placement abdominal fluid collection   Requesting Provider: Lyndon Velez MD    HPI: Laila Powell is a 30 y.o. old female who reports H/O breast augmentation and tummy tuck in 2020 with post abdominal bloating and imaging in 2021 concerning for abdominal fluid collection and presents for drain placement. Denies fevers/chills/pain.    NPO Status: Mn  Anticoagulation/Antiplatelets/Bleeding tendencies: NONE   Antibiotics: NONE     Review of Systems: A comprehensive 10-point review of systems was performed. All systems were reviewed and negative with exception to those reported in the HPI.    PMH:  Past Medical History:   Diagnosis Date   ? Acute cystitis    ? Anemia    ? Candidiasis of vulva and vagina    ? Disease of thyroid gland    ? Dizziness    ? Hypothyroidism    ? Microscopic hematuria    ? Pharyngitis    ? UTI (urinary tract infection)        PSH:  Past Surgical History:   Procedure Laterality Date   ?  SECTION, LOW TRANSVERSE     ? CHOLECYSTECTOMY         ALLERGIES  Levothyroxine    MEDICATIONS:  Current Outpatient Medications on File Prior to Encounter   Medication Sig Dispense Refill   ? acetaminophen (TYLENOL) 500 MG tablet Take 500 mg by mouth every 6 (six) hours as needed for pain.     ? ibuprofen (ADVIL,MOTRIN) 800 MG tablet Take 1 tablet (800 mg total) by mouth every 8 (eight) hours. 45 tablet 0   ? SYNTHROID 88 mcg tablet Take 80 mcg by mouth once daily.  0     No current facility-administered medications on file prior to encounter.        LABS:  Urine test today negative      EXAM:  /75  "  Pulse 79   Temp 98.5  F (36.9  C) (Oral)   Resp 16   Ht 4' 11\" (1.499 m)   Wt 140 lb (63.5 kg)   SpO2 97%   BMI 28.28 kg/m      General: Stable. In no acute distress  Neuro: A&O x3.  Moves all extremities equally.  Resp: Lungs CTA bilaterally.  Cardio: S1S2 and reg, without murmur, clicks or rubs.  Abdomen: +BS. Soft, mild distended upper abdomen and non-tender.      Pre-Sedation Assessment:  Mallampati Airway Classification: Class 3: soft and hard palates clearly visible  Previous reaction to anesthesia/sedation: no  Sedation plan based on assessment: Moderate  Sleep Apnea: no  Dentures: no  COPD: no  ASA Classification: ASA 2 - Patient with mild systemic disease with no functional limitations  Code Status: continue  patient's current code status (full code) intraprocedure, per discussion with patient       ASSESSMENT/PLAN:    Case reviewed with Dr. Escobar    Image guided drain placement in abdominal fluid collection   The procedure, risks/benefits and sedation as needed were discussed with patient, all questions answered and patient agrees to proceed with the procedure.      Irene Ervin, CNP  Interventional Radiology  [phone]       "

## 2021-07-30 ENCOUNTER — OFFICE VISIT (OUTPATIENT)
Dept: FAMILY MEDICINE | Facility: CLINIC | Age: 31
End: 2021-07-30
Payer: COMMERCIAL

## 2021-07-30 VITALS
WEIGHT: 138 LBS | DIASTOLIC BLOOD PRESSURE: 78 MMHG | RESPIRATION RATE: 22 BRPM | HEART RATE: 82 BPM | OXYGEN SATURATION: 99 % | BODY MASS INDEX: 29.77 KG/M2 | HEIGHT: 57 IN | TEMPERATURE: 98.3 F | SYSTOLIC BLOOD PRESSURE: 112 MMHG

## 2021-07-30 DIAGNOSIS — E66.09 CLASS 1 OBESITY DUE TO EXCESS CALORIES WITHOUT SERIOUS COMORBIDITY WITH BODY MASS INDEX (BMI) OF 31.0 TO 31.9 IN ADULT: ICD-10-CM

## 2021-07-30 DIAGNOSIS — E66.811 CLASS 1 OBESITY DUE TO EXCESS CALORIES WITHOUT SERIOUS COMORBIDITY WITH BODY MASS INDEX (BMI) OF 31.0 TO 31.9 IN ADULT: ICD-10-CM

## 2021-07-30 DIAGNOSIS — Z98.890 POST-OPERATIVE STATE: Primary | ICD-10-CM

## 2021-07-30 DIAGNOSIS — S30.1XXA ABDOMINAL WALL SEROMA, INITIAL ENCOUNTER: ICD-10-CM

## 2021-07-30 PROCEDURE — 99214 OFFICE O/P EST MOD 30 MIN: CPT | Mod: GC | Performed by: STUDENT IN AN ORGANIZED HEALTH CARE EDUCATION/TRAINING PROGRAM

## 2021-07-30 RX ORDER — PHENTERMINE HYDROCHLORIDE 15 MG/1
15 CAPSULE ORAL EVERY MORNING
Qty: 30 CAPSULE | Refills: 0 | Status: SHIPPED | OUTPATIENT
Start: 2021-07-30 | End: 2021-08-29

## 2021-07-30 ASSESSMENT — MIFFLIN-ST. JEOR: SCORE: 1214.84

## 2021-07-30 NOTE — PROGRESS NOTES
ASSESSMENT/PLAN:     Post-operative state  Post op check after tummy tuck procedure. Doing well. Has been in contact with surgeon regarding stitch reaction. No erythema or fluctuation to suggest infection. Requesting paper copy of ACE abdomen binder.   - Elastic Bandages & Supports (ACE ABDOMEN SURGICAL BINDER) MISC  Dispense: 1 each; Refill: 0    Class 1 obesity due to excess calories without serious comorbidity with body mass index (BMI) of 31.0 to 31.9 in adult  Has completed 2/3 months of phentermine. Interested in 1 more month of this. Down 8 pounds in last month. No previous complications with this medication.   - phentermine (ADIPEX-P) 15 MG capsule  Dispense: 30 capsule; Refill: 0       Patient Instructions   1. Surgical site looks good today. Call if you notice increase in redness, swelling, odor or pain.   2. Refill of the phentermine sent today. Follow up for a weight check in 1 month  3. Letter sent today.         Schedule follow-up appointment in 1 month(s).      Medications Discontinued During This Encounter   Medication Reason     phentermine (ADIPEX-P) 15 MG capsule      Elastic Bandages & Supports (ACE ABDOMEN SURGICAL BINDER) [facility]       Lyndon Velez MD  [facility] Resident  Pager# [phone]    Precepted with: Dr. Hernández    Options for treatment and follow-up care were reviewed with the patient and/or guardian. Laila Powell and/or guardian engaged in the decision making process and verbalized understanding of the options discussed and agreed with the final plan    CHIEF COMPLAINT                                                      Chief Complaint   Patient presents with     RECHECK     tummy tuck, suture check, needs a note for work      Medication Reconciliation     Not complete     SUBJECTIVE:                                                    Laila Powell is a 30 year old year old female who presents to clinic today for the following health issues:    Surgery last month  in [place] with no complications. The sutures used were dissolvable, but has been pushing out or sticking out of her scar the past few weeks. Patient has been pulling out sutures from different areas of the scar that she notices throughout the day particularly when showering. Surgeon suggested trimming sutures as they come out of the incision which patient has been doing. First surgery had the same complications with sutures coming out of the incision. No pain or irritation in the area, some pressure and pain when removing sutures. Only utilizes scar cream for belly button, where patient has some keloid scarring.     Wearing short wrap on scar throughout the day except when showering or massaging her scar. Can't wear faja shaper because of the suture issue.     Also has sharp pain down left side of the stomach described as scratching down her stomach and very painful. Occurs everyday, pain is constant with intermitted exacerbations. Would like work restrictions for this reason. Did not happen after the first surgery. Takes 2 every 6 hours of Tylenol. Took Percocet, Zofran, Augmentin, Zanaflex for about 2 weeks after surgery as prescribed by surgery team.    Stopped taking phenterine, interested about discussing new medication plan for weight loss.      Patient is an established patient of this clinic.    ----------------------------------------------------------------------------------------------------------------------  Patient Active Problem List   Diagnosis     Health Care Home     Microscopic hematuria     Hypothyroidism     Gastroesophageal reflux disease, esophagitis presence not specified     Vitamin D deficiency     Class 1 obesity due to excess calories without serious comorbidity with body mass index (BMI) of 31.0 to 31.9 in adult     Past Surgical History:   Procedure Laterality Date     ------------OTHER-------------  10/06/2020    BBL - Stomach reconstruction breast augmenatation      C/SECTION, LOW  TRANSVERSE       CHOLECYSTECTOMY  2013    [facility]     CHOLECYSTECTOMY       COMBINED AUGMENTATION MAMMAPLASTY AND ABDOMINOPLASTY       IR ABSCESS DRAIN EXCHANGE  3/9/2021     IR ABSCESS DRAIN INSERTION  3/1/2021     IR ABSCESS DRAIN INSERTION  3/31/2021     IR ABSCESS DRAIN INSERTION  3/1/2021     IR ABSCESS DRAIN INSERTION  3/31/2021     IR ABSCESS TUBE CHANGE  3/9/2021     OTHER SURGICAL HISTORY      tummy tuck       Social History     Tobacco Use     Smoking status: Passive Smoke Exposure - Never Smoker     Smokeless tobacco: Never Used     Tobacco comment:  smokes   Substance Use Topics     Alcohol use: No     Alcohol/week: 0.0 standard drinks     Family History   Problem Relation Age of Onset     Diabetes No family hx of      Coronary Artery Disease No family hx of      Hypertension No family hx of      Breast Cancer No family hx of      Colon Cancer No family hx of      Prostate Cancer No family hx of      Other Cancer No family hx of      No Known Problems Mother      No Known Problems Father          Problem list and past medical, surgical, social, and family histories reviewed & adjusted, as indicated.    Current Outpatient Medications   Medication Sig Dispense Refill     Elastic Bandages & Supports (ACE ABDOMEN SURGICAL BINDER) MISC 1 Units daily 1 each 0     phentermine (ADIPEX-P) 15 MG capsule Take 1 capsule (15 mg) by mouth every morning 30 capsule 0     omeprazole (PRILOSEC) 20 MG DR capsule Take 1 capsule (20 mg) by mouth 2 times daily 180 capsule 3     ondansetron (ZOFRAN-ODT) 4 MG ODT tab Take 1 tablet (4 mg) by mouth every 8 hours as needed for nausea 30 tablet 3     SYNTHROID 112 MCG tablet Take 1 tablet (112 mcg) by mouth daily 90 tablet 3     vitamin D3 (CHOLECALCIFEROL) 50 mcg (2000 units) tablet Take 1 tablet (50 mcg) by mouth daily 90 tablet 3     Medication list reviewed and updated as indicated.    Allergies   Allergen Reactions     Ciprofloxacin       "Levothyroxine Swelling     Allergies reviewed and updated as indicated.  ----------------------------------------------------------------------------------------------------------------------  ROS:     ROS: 10 point ROS neg other than the symptoms noted above in the HPI.    OBJECTIVE:     /78   Pulse 82   Temp 98.3  F (36.8  C)   Resp 22   Ht 1.448 m (4' 9\")   Wt 62.6 kg (138 lb)   SpO2 99%   BMI 29.86 kg/m    Body mass index is 29.86 kg/m .  GENERAL: Appears well and in no acute distress.  CARDIOVASCULAR: No peripheral edema  RESPIRATORY: No visible respiratory distress.   ABD: Soft, non-tender. Healing surgical incision lower abdomen. No fluctuance or spreading erythema.   MSK: No gross extremity deformities. No peripheral edema. Normal muscle bulk.    Diagnostic Test Results:  none           "

## 2021-07-30 NOTE — PATIENT INSTRUCTIONS
1. Surgical site looks good today. Call if you notice increase in redness, swelling, odor or pain.   2. Refill of the phentermine sent today. Follow up for a weight check in 1 month  3. Letter sent today.

## 2021-07-30 NOTE — LETTER
RETURN TO WORK/SCHOOL FORM    7/30/2021    Re: Laila Powell  1990      To Whom It May Concern:     Laila Powell was seen in clinic today..  She may return to work on 8/16/2021 with restrictions. She may return to light duty with a lifting restriction of no more than 20 lbs.          Lyndon Velez MD  7/30/2021 9:44 AM

## 2021-07-30 NOTE — PROGRESS NOTES
Wt Readings from Last 3 Encounters:   07/30/21 62.6 kg (138 lb)   06/02/21 66.2 kg (146 lb)   05/24/21 64.9 kg (143 lb)

## 2021-08-19 ENCOUNTER — VIRTUAL VISIT (OUTPATIENT)
Dept: FAMILY MEDICINE | Facility: CLINIC | Age: 31
End: 2021-08-19
Payer: COMMERCIAL

## 2021-08-19 ENCOUNTER — MYC MEDICAL ADVICE (OUTPATIENT)
Dept: FAMILY MEDICINE | Facility: CLINIC | Age: 31
End: 2021-08-19

## 2021-08-19 DIAGNOSIS — T81.31XA POSTOPERATIVE WOUND DEHISCENCE, INITIAL ENCOUNTER: Primary | ICD-10-CM

## 2021-08-19 PROCEDURE — 99213 OFFICE O/P EST LOW 20 MIN: CPT | Mod: 95 | Performed by: STUDENT IN AN ORGANIZED HEALTH CARE EDUCATION/TRAINING PROGRAM

## 2021-08-19 NOTE — PROGRESS NOTES
"Family Medicine Video Visit Note  Laila is being evaluated via a billable video visit.             Video Visit Consent     Patient was verbally read the following and verbal consent was obtained.  \"Video visits are billed at different rates depending on your insurance coverage. During this emergency period, for some insurers they may be billed the same as an in-person visit.  Please reach out to your insurance provider with any questions.  If during the course of the call the physician/provider feels a video visit is not appropriate, you will not be charged for this service.\"     (Name person giving consent:  Patient   Date verbal consent given:  8/19/2021  Time verbal consent given:  4:27 PM)    Patient would like the video invitation sent by: My chart       Chief Complaint   Patient presents with     Wound Check     left hip, wound opening back up, started back to work Tuesday and possibly from that, bleeding and draining     Medication Reconciliation     needs attention            HPI     Video Start Time: 4:25 PM    Laila presents to clinic today for the following health issues:    Surgical wound:  -Started work this past Tuesday. After being on her feet for most of the day she noted that her wound had opened up on the left side  -No pus, mild clear drainage  -No fevers or chills  -has been putting guaze on it  -Messaged her surgeon about it, they wanted her to send a picture of it. Still waiting to hear back.       Current Outpatient Medications   Medication Sig Dispense Refill     Elastic Bandages & Supports (ACE ABDOMEN SURGICAL BINDER) MISC 1 Units daily 1 each 0     omeprazole (PRILOSEC) 20 MG DR capsule Take 1 capsule (20 mg) by mouth 2 times daily 180 capsule 3     ondansetron (ZOFRAN-ODT) 4 MG ODT tab Take 1 tablet (4 mg) by mouth every 8 hours as needed for nausea 30 tablet 3     phentermine (ADIPEX-P) 15 MG capsule Take 1 capsule (15 mg) by mouth every morning 30 capsule 0     SYNTHROID 112 MCG tablet Take " "1 tablet (112 mcg) by mouth daily 90 tablet 3     vitamin D3 (CHOLECALCIFEROL) 50 mcg (2000 units) tablet Take 1 tablet (50 mcg) by mouth daily 90 tablet 3     Allergies   Allergen Reactions     Ciprofloxacin      Levothyroxine Swelling              Review of Systems:     Constitutional, HEENT, cardiovascular, pulmonary, gi and gu systems are negative, except as otherwise noted.         Physical Exam:     There were no vitals taken for this visit.  Estimated body mass index is 29.86 kg/m  as calculated from the following:    Height as of 7/30/21: 1.448 m (4' 9\").    Weight as of 7/30/21: 62.6 kg (138 lb).    GENERAL: Healthy, alert and no distress  EYES: Eyes grossly normal to inspection.  No discharge or erythema, or obvious scleral/conjunctival abnormalities.  RESP: No audible wheeze, cough, or visible cyanosis.  No visible retractions or increased work of breathing.    SKIN: Wound with exposed granulation tissue approximately 1-2 cm in diameter  NEURO: Cranial nerves grossly intact.  Mentation and speech appropriate for age.  PSYCH: Mentation appears normal, affect normal/bright, judgement and insight intact, normal speech and appearance well-groomed.      Results from last visit:  Office Visit on 06/02/2021   Component Date Value Ref Range Status     WBC 06/02/2021 5.7  4.0 - 11.0 10e9/L Final     RBC 06/02/2021 4.66  3.80 - 5.20 10e12/L Final     Hemoglobin 06/02/2021 13.7  11.7 - 15.7 g/dL Final     Hematocrit 06/02/2021 39.9  35.0 - 47.0 % Final     MCV 06/02/2021 85.6  78.0 - 100.0 fL Final     MCH 06/02/2021 29.4  26.5 - 35.0 pg Final     MCHC 06/02/2021 34.3  32.0 - 36.0 g/dL Final     RDW 06/02/2021 11.6  10.0 - 15.0 % Final     Platelets 06/02/2021 329.0  150.0 - 450.0 10e9/L Final             Assessment and Plan     1. Postoperative wound dehiscence, initial encounter  Patient here today to follow up surgical [place]. Based on video today patient has wound dehiscence on " left side with 1-2 cm gap. Needs to be evaluated by surgeon here, referral placed. She also sent a picture to her surgeon out of state who will review. No purulence, erythema or fevers to suggest infection. Work note provided today.   - Plastic Surgery Referral; Future    Refilled medications that would be required in the next 3 months.     After Visit Information:  Patient chose to view AVS via [employer]      No follow-ups on file.      Video-Visit Details    Type of service:  Video Visit    Video End Time (time video stopped): 4:40 PM    Originating Location (pt. Location): Home    Distant Location (provider location):  [facility]     Platform used for Video Visit: Joseph Velez MD  I precepted today with Dr. Hallman

## 2021-08-19 NOTE — LETTER
RETURN TO WORK/SCHOOL FORM    8/19/2021    Re: Laila Powell  1990      To Whom It May Concern:     Laila Powell was seen in clinic today. Due to postoperative complications she may be excused from work for the next two weeks.         Lyndon Velez MD  8/19/2021 4:50 PM

## 2021-08-21 NOTE — PROGRESS NOTES
Preceptor Attestation:    I discussed the patient with the resident. I have verified the content of the note, which accurately reflects my assessment of the patient and the plan of care.   Supervising Physician:  Marguerite Hallman MD.

## 2021-09-02 ENCOUNTER — TELEPHONE (OUTPATIENT)
Dept: FAMILY MEDICINE | Facility: CLINIC | Age: 31
End: 2021-09-02

## 2021-09-02 DIAGNOSIS — E03.9 HYPOTHYROIDISM, UNSPECIFIED TYPE: ICD-10-CM

## 2021-09-02 NOTE — TELEPHONE ENCOUNTER
[facility] phone call message- patient requesting a refill:    Full Medication Name: medroxyprogesterone (oral)    Dose: NA    Pharmacy confirmed as   [facility] #[id number] - [place], [place] - [address] [place] [address]  Phone: [phone] Fax: [phone]  : Yes    Additional Comments: PT was under the impression that she was supposed to receive a refill on the following birth control medication referenced above. However, when she contacted the Pharmacy, they informed her the order was cancelled. She would like a refill to continue her birth control routine.    Furthermore, she would like a refill on   SYNTHROID 112 MCG tablet 90 tablet   as well if possible.    OK to leave a message on voice mail? Yes    Primary language: English      needed? No    Call taken on September 2, 2021 at 4:05 PM by Mary Edwards

## 2021-09-03 RX ORDER — LEVOTHYROXINE SODIUM 112 MCG
112 TABLET ORAL DAILY
Qty: 90 TABLET | Refills: 3 | Status: SHIPPED | OUTPATIENT
Start: 2021-09-03 | End: 2022-09-26

## 2021-09-10 NOTE — TELEPHONE ENCOUNTER
PT called in to advise that she received the medication called SYNTHROID  but she's still needing medroxyprogesterone (oral) as its a birth control she needs. Please advise if a refill can be sent for this to the [facility] on [address]

## 2021-09-10 NOTE — TELEPHONE ENCOUNTER
Medroxyprogesterone was previously on her medication list, however it appears it was stopped a few months ago. I would recommend the patient to make a follow-up visit to discuss birth control and why this medication was stopped by the patient.     Dee Winchester MD

## 2021-09-12 ENCOUNTER — HEALTH MAINTENANCE LETTER (OUTPATIENT)
Age: 31
End: 2021-09-12

## 2021-10-13 ENCOUNTER — VIRTUAL VISIT (OUTPATIENT)
Dept: FAMILY MEDICINE | Facility: CLINIC | Age: 31
End: 2021-10-13
Payer: COMMERCIAL

## 2021-10-13 ENCOUNTER — MYC REFILL (OUTPATIENT)
Dept: FAMILY MEDICINE | Facility: CLINIC | Age: 31
End: 2021-10-13

## 2021-10-13 DIAGNOSIS — E28.2 PCOS (POLYCYSTIC OVARIAN SYNDROME): ICD-10-CM

## 2021-10-13 DIAGNOSIS — Z30.011 ENCOUNTER FOR INITIAL PRESCRIPTION OF CONTRACEPTIVE PILLS: Primary | ICD-10-CM

## 2021-10-13 DIAGNOSIS — S30.1XXA ABDOMINAL WALL SEROMA, INITIAL ENCOUNTER: ICD-10-CM

## 2021-10-13 PROCEDURE — 99213 OFFICE O/P EST LOW 20 MIN: CPT | Mod: 95 | Performed by: STUDENT IN AN ORGANIZED HEALTH CARE EDUCATION/TRAINING PROGRAM

## 2021-10-13 RX ORDER — DROSPIRENONE AND ETHINYL ESTRADIOL 0.02-3(28)
1 KIT ORAL DAILY
Qty: 28 TABLET | Refills: 3 | Status: SHIPPED | OUTPATIENT
Start: 2021-10-13 | End: 2021-11-03 | Stop reason: SINTOL

## 2021-10-13 NOTE — PROGRESS NOTES
"Family Medicine Video Visit Note  Laila is being evaluated via a billable video visit.             Video Visit Consent     Patient was verbally read the following and verbal consent was obtained.  \"Video visits are billed at different rates depending on your insurance coverage. During this emergency period, for some insurers they may be billed the same as an in-person visit.  Please reach out to your insurance provider with any questions.  If during the course of the call the physician/provider feels a video visit is not appropriate, you will not be charged for this service.\"     (Name person giving consent:  Patient   Date verbal consent given:  10/13/2021  Time verbal consent given:  9:15 AM)    Patient would like the video invitation sent by: Send to e-mail at: [email or web address]     Chief Complaint   Patient presents with     Contraception     f/u birth control medication     Medication Reconciliation     Complete     Refill Request     omeprazole, zofran            HPI       Video Start Time: 8:45 AM    Laila presents to clinic today for the following health issues:    Birth control refill:  -Has been on provera in the past, did well with this medication. Takes consistently with thyroid medication  -Had regular periods while on this   -Currently sexually active  -LMP 2 months ago, typical for periods to be irregular  -Has not taken a pregnancy test recently  -Has been on combined OCP in the past but made her too hungry      Current Outpatient Medications   Medication Sig Dispense Refill     drospirenone-ethinyl estradiol (SHABNAM) 3-0.02 MG tablet Take 1 tablet by mouth daily 28 tablet 3     omeprazole (PRILOSEC) 20 MG DR capsule Take 1 capsule (20 mg) by mouth 2 times daily 180 capsule 3     ondansetron (ZOFRAN-ODT) 4 MG ODT tab Take 1 tablet (4 mg) by mouth every 8 hours as needed for nausea 30 tablet 3     SYNTHROID 112 MCG tablet Take 1 tablet (112 mcg) by mouth daily 90 tablet 3     vitamin D3 (CHOLECALCIFEROL) 50 " "mcg (2000 units) tablet Take 1 tablet (50 mcg) by mouth daily 90 tablet 3     Elastic Bandages & Supports (ACE ABDOMEN SURGICAL BINDER) MISC 1 Units daily 1 each 0     Allergies   Allergen Reactions     Ciprofloxacin      Levothyroxine Swelling              Review of Systems:     Constitutional, HEENT, cardiovascular, pulmonary, gi and gu systems are negative, except as otherwise noted.         Physical Exam:     There were no vitals taken for this visit.  Estimated body mass index is 29.86 kg/m  as calculated from the following:    Height as of 7/30/21: 1.448 m (4' 9\").    Weight as of 7/30/21: 62.6 kg (138 lb).    GENERAL: Healthy, alert and no distress  EYES: Eyes grossly normal to inspection.  No discharge or erythema, or obvious scleral/conjunctival abnormalities.  RESP: No audible wheeze, cough, or visible cyanosis.  No visible retractions or increased work of breathing.    SKIN: Visible skin clear. No significant rash, abnormal pigmentation or lesions.  NEURO: Cranial nerves grossly intact.  Mentation and speech appropriate for age.  PSYCH: Mentation appears normal, affect normal/bright, judgement and insight intact, normal speech and appearance well-groomed.            Assessment and Plan   (Z30.011) Encounter for initial prescription of contraceptive pills  (primary encounter diagnosis)   (E28.2) PCOS (polycystic ovarian syndrome)  Comment: Visit today to re-establish patient on OCP. Interested in combined OCP today but something less hunger-provoking. Will trial shabnam. LMP > 5 days ago and currently sexually active, recommended pregnancy test now and to use back up contraception for next week.   Plan: drospirenone-ethinyl estradiol (SHABNAM) 3-0.02 MG         tablet          Refilled medications that would be required in the next 3 months.     After Visit Information:  Patient chose to view AVS via [employer]      No follow-ups on file.      Video-Visit Details    Type of service:  Video Visit    Video End Time " (time video stopped): 8:55 AM    Originating Location (pt. Location): Home    Distant Location (provider location):  [facility] [place]     Platform used for Video Visit: Joseph Velez MD  I precepted today with Dr. Alba

## 2021-10-17 NOTE — PROGRESS NOTES
Preceptor Attestation:  Patient's case reviewed and discussed with Lyndon Velez MD resident. I agree with written assessment and plan of care.  Supervising Physician:  Marquis Alba MD, MD [facility]

## 2021-11-03 ENCOUNTER — MYC MEDICAL ADVICE (OUTPATIENT)
Dept: FAMILY MEDICINE | Facility: CLINIC | Age: 31
End: 2021-11-03

## 2021-11-03 DIAGNOSIS — E28.2 PCOS (POLYCYSTIC OVARIAN SYNDROME): Primary | ICD-10-CM

## 2021-11-03 RX ORDER — ACETAMINOPHEN AND CODEINE PHOSPHATE 120; 12 MG/5ML; MG/5ML
0.35 SOLUTION ORAL DAILY
Qty: 84 TABLET | Refills: 3 | Status: SHIPPED | OUTPATIENT
Start: 2021-11-03 | End: 2022-10-03

## 2021-11-03 NOTE — TELEPHONE ENCOUNTER
Called patient and discussed. Interested in switching to progestin only contraception and would like endometrial protection in the setting of PCOS. Will try minipill, prescription sent to pharmacy. Will have patient follow up for PCOS as we also discussed intiating metformin for possible weight loss benefit.     Lyndon Velez MD

## 2022-01-01 ENCOUNTER — HEALTH MAINTENANCE LETTER (OUTPATIENT)
Age: 32
End: 2022-01-01

## 2022-01-11 ENCOUNTER — TELEPHONE (OUTPATIENT)
Dept: FAMILY MEDICINE | Facility: CLINIC | Age: 32
End: 2022-01-11
Payer: COMMERCIAL

## 2022-01-11 NOTE — TELEPHONE ENCOUNTER
Prior Authorization needed on:      Medication:  Synthroid  Dose:  112mcg daily    Pharmacy confirmed as   [id number] - [place], [place] - [address] AT [facility] [address]  [address]  [place] [facility] [address]  Phone: [phone] Fax: [phone]  : Yes    Insurance Name:  Unclear of new insurance information for 2022. Information [place].         Zaynab Treviño RPH January 11, 2022 at 4:00 PM

## 2022-01-13 NOTE — TELEPHONE ENCOUNTER
Central Prior Authorization Team   Phone: [phone]      PA Initiation    Medication: Synthroid-Initiated  Insurance Company: [employer] - Phone [phone] Fax [phone]  Pharmacy Filling the Rx: [facility] #[id number] - [place], [place] - [address] AT [facility]  Filling Pharmacy Phone: [phone]  Filling Pharmacy Fax:    Start Date: 1/13/2022

## 2022-01-14 NOTE — TELEPHONE ENCOUNTER
Prior Authorization Approval    Authorization Effective Date: 1/1/2022  Authorization Expiration Date: 1/13/2023  Medication: Synthroid-APPROVED  Approved Dose/Quantity:   Reference #:     Insurance Company: [employer]AP - Phone [phone] Fax [phone]  Expected CoPay:       CoPay Card Available:      Foundation Assistance Needed:    Which Pharmacy is filling the prescription (Not needed for infusion/clinic administered): [id number] [place], [place] - [address] AT [facility] [address]  Pharmacy Notified: Yes  Patient Notified: No      Pharmacy will notify patient when medication is ready.

## 2022-02-17 PROBLEM — K21.9 GASTROESOPHAGEAL REFLUX DISEASE: Status: ACTIVE | Noted: 2020-06-19

## 2022-05-10 ENCOUNTER — VIRTUAL VISIT (OUTPATIENT)
Dept: SURGERY | Facility: CLINIC | Age: 32
End: 2022-05-10
Payer: COMMERCIAL

## 2022-05-10 VITALS — WEIGHT: 143 LBS | HEIGHT: 59 IN | BODY MASS INDEX: 28.83 KG/M2

## 2022-05-10 DIAGNOSIS — K21.9 GASTROESOPHAGEAL REFLUX DISEASE, UNSPECIFIED WHETHER ESOPHAGITIS PRESENT: ICD-10-CM

## 2022-05-10 DIAGNOSIS — E28.2 POLYCYSTIC OVARIES: ICD-10-CM

## 2022-05-10 DIAGNOSIS — E66.3 OVERWEIGHT (BMI 25.0-29.9): Primary | ICD-10-CM

## 2022-05-10 PROBLEM — N92.6 MISSED PERIOD: Status: ACTIVE | Noted: 2022-05-10

## 2022-05-10 PROBLEM — N97.0 FEMALE INFERTILITY ASSOCIATED WITH ANOVULATION: Status: ACTIVE | Noted: 2022-05-10

## 2022-05-10 PROBLEM — E06.3 AUTOIMMUNE THYROIDITIS: Status: ACTIVE | Noted: 2022-05-10

## 2022-05-10 PROCEDURE — 99215 OFFICE O/P EST HI 40 MIN: CPT | Mod: 95 | Performed by: FAMILY MEDICINE

## 2022-05-10 NOTE — PROGRESS NOTES
"Laila Powell is 31 year old  female who presents for a billable video visit today.    How would you like to obtain your AVS? MyChart  If dropped from the video visit, the video invitation should be resent by: Send to e-mail at: [email or web address]  Will anyone else be joining your video visit? No      Video Start Time: 2:15 pm    Are there any specific questions or needs that you would like addressed at your visit today?    Initial Non Surgical Consult with Bariatrician. Pt is not interested in surgery at this time. She has tried different methods for weight loss including medication and shakes. The shakes made her feel bloated. She also tried Phentermine for 3 months and lost 5-10 pounds but didn't like the side effects. She had headaches and felt jittery. It also made her feel hungry. Pt did have a mommy make-over 10/2020 and developed a seroma and had a revision in 2021.     Pt is feeling much better after the revision and ready to lose weight again.      Jesus Nation  [facility]  [facility]  [place] [address]  Office: [phone]  Fax: [phone]          Provider Notes:        New Medical Weight Management Consult    PATIENT:  Laila Powell  MRN:         [id number]  :         1990  ALEXANDRA:         5/10/2022    Dear Dr. Sherman,    I had the pleasure of seeing your patient, Laila Powell. Full intake/assessment was done to determine barriers to weight loss success and develop a treatment plan. Laila Powell is a 31 year old female interested in treatment of medical problems associated with excess weight. She has a height of 4' 11\", a weight of 143 lbs 0 oz, and the calculated Body mass index is 28.88 kg/m .    ASSESSMENT/PLAN:  1. Overweight  We discussed healthy habits to assist with weight loss. She will work on planning meals ahead of time using the plate method for " "portion control and macronutrient proportions. She will try to increase her exercise. We discussed medication that may assist with weight loss. Semiglutide was prescribed. Risks/ benefits and possible side effects were discussed and questions were answered. Written information was given. If this is not covered she will try metformin.      2. Gastroesophageal reflux disease, unspecified whether esophagitis present  This may improve with healthy habits and weight loss.    3. Polycystic ovaries  We discussed insulin resistance and how it can interfere with weight loss efforts. We discussed dietary changes and exercise to reduce insulin resistance. We discussed the use of metformin. We will try this if we cannot get coverage for a GLP1 agonist.    She has the following co-morbidities:       5/10/2022   I have the following health issues associated with obesity: Polycystic Ovarian Syndrome, GERD (Reflux), Hypothyroidism   I have the following symptoms associated with obesity: Irregular Menstral Cycle       Patient Goals 5/10/2022   I am interested in having a healthier weight to diminish current health problems: Yes   I am interested in having a healthier weight in order to prevent future health problems: Yes   I am interested in having a healthier weight in order to have a future surgery: No       Referring Provider 5/10/2022   Please name the provider who referred you to Medical Weight Management.  If you do not know, please answer: \"I Don't Know\". Dr. Lyndon Sherman       Weight History 5/10/2022   How concerned are you about your weight? Very Concerned   Would you describe your weight gain as gradual? No   I became overweight: In College   The following factors have contributed to my weight gain:  Change in Schedule, Started on Medication that Caused Weight Gain, Eating Wrong Types of Food, Eating Too Much, Other   Please list the other factors.  Thyroid   I have tried the following methods to lose weight: Watching " Portions or Calories, Exercise, Nutrisystem, Slim Fast or Other Liquid Diets- made her bloating or tired, Medications, Meal Replacements, Fasting   Please list the medications you have tried.  Phentermine- jittery, headaches, stopped helping   My lowest weight since age 17 was: 95   My highest weight since age 17 was: 149   The most weight I have ever lost was: (lbs) 10   Has anyone in your family had weight loss surgery? No   How has your weight changed over the last year?  Gained       Diet Recall Review with Patient 5/10/2022   Do you typically eat breakfast? Occasional toast, lately just drinks coffee   Do you typically eat lunch? Yes   If you do eat lunch, what types of food do you typically eat?  Easy prep food microwave food, unless I cook at from chicken breast and vegetable, frozen meal   Do you typically eat supper? Yes   If you do eat supper, what types of food do you typically eat? Rice, chicken, shrimp, sometimes vegetables   Do you typically eat snacks? Yes- not too often   If you do snack, what types of food do you typically eat? Craves sugar- rice krispie or granola bar   Do you like vegetables?  Yes   Do you drink water? Yes- 64 oz plus   How many glasses of juice do you drink in a typical day? none   How many of glasses of milk do you drink in a typical day? 0   If you do drink milk, what type? N/A   How many 8oz glasses of sugar containing drinks such as [employer]/sweet tea do you drink in a day? 0   How many cans/bottles of sugar pop/soda/tea/sports drinks do you drink in a day? When she goes out to eat   How many cans/bottles of diet pop/soda/tea or sports drink do you drink in a day? 0   How often do you have a drink of alcohol? Never     Meals not prepared at home per week: 0-1    Eating Habits 5/10/2022   Generally, my meals include foods like these: bread, pasta, rice, potatoes, corn, crackers, sweet dessert, pop, or juice. A Few Times a Week   Generally, my meals include foods like these:  fried meats, brats, burgers, french fries, pizza, cheese, chips, or ice cream. A Few Times a Week   Eat fast food (like McDonalds, [employer], Taco Bell). Less Than Weekly   Eat at a buffet or sit-down restaurant. Less Than Weekly   Eat most of my meals in front of the TV or computer. Less Than Weekly   Often skip meals, eat at random times, have no regular eating times. Once a Week   Rarely sit down for a meal but snack or graze throughout.  Once a Week   Eat extra snacks between meals. A Few Times a Week   Eat most of my food at the end of the day. A Few Times a Week   Eat in the middle of the night or wake up at night to eat. Once a Week   Eat extra snacks to prevent or correct low blood sugar. A Few Times a Week   Eat to prevent acid reflux or stomach pain. A Few Times a Week   Worry about not having enough food to eat. Never   Have you been to the food shelf at least a few times this year? No   I eat when I am depressed. Never   I eat when I am stressed. Never   I eat when I am bored. Almost Everyday   I eat when I am anxious. Never   I eat when I am happy or as a reward. A Few Times a Week   I feel hungry all the time even if I just have eaten. Almost Everyday   Feeling full is important to me. Almost Everyday   I finish all the food on my plate even if I am already full. Almost Everyday   I can't resist eating delicious food or walk past the good food/smell. Almost Everyday   I eat/snack without noticing that I am eating. Never   I eat when I am preparing the meal. Everyday   I eat more than usual when I see others eating. A Few Times a Week   I have trouble not eating sweets, ice cream, cookies, or chips if they are around the house. Once a Week   I think about food all day. Everyday   What foods, if any, do you crave? Sweets/Candy/Chocolate   Please list any other foods you crave? Everything       Amount of Food 5/10/2022   I make myself vomit what I have eaten or use laxatives to get rid of food. Never    I eat a large amount of food, like a loaf of bread, a box of cookies, a pint/quart of ice cream, all at once. Almost Everyday   I eat a large amount of food even when I am not hungry. Almost Everyday   I eat rapidly. Almost Everyday   I eat alone because I feel embarrassed and do not want others to see how much I have eaten. Never   I eat until I am uncomfortably full. Never   I feel bad, disgusted, or guilty after I overeat. Weekly   I make myself vomit what I have eaten or use laxatives to get rid of food. Never       Activity/Exercise History 5/10/2022   How much of a typical 12 hour day do you spend sitting? Less Than Half the Day   How much of a typical 12 hour day do you spend lying down? Less Than Half the Day   How much of a typical day do you spend walking/standing? Most of the Day   How many hours (not including work) do you spend on the TV/Video Games/Computer/Tablet/Phone? 1 Hour or Less   How many times a week are you active for the purpose of exercise? 2-3 Times a Week, walks with the kids outside   What keeps you from being more active? Lack of Time, Too tired   How many total minutes do you spend doing some activity for the purpose of exercising when you exercise? 15-30 Minutes       PAST MEDICAL HISTORY:  Past Medical History:   Diagnosis Date     Acute cystitis      Anemia 11/4/2013     Anemia      Candidiasis of vulva and vagina      Disease of thyroid gland      Dizziness      Headache      Hypothyroidism 11/4/2013     Hypothyroidism      Microscopic hematuria      Pharyngitis      Reflux gastritis      UTI (urinary tract infection)        Work/Social History Reviewed With Patient 5/10/2022   My employment status is: Full-Time   My job is: Medical Assistant   How much of your job is spent on the computer or phone? 75%   How many hours do you spend commuting to work daily?  2   What is your marital status? Single   If in a relationship, is your significant other overweight? No   Do you have  children? Yes   If you have children, are they overweight? No   Who do you live with?  My child   Who does the food shopping?  Myself       Mental Health History Reviewed With Patient 5/10/2022   Have you ever been physically or sexually abused? No   If yes, do you feel that the abuse is affecting your weight? No   If yes, would you like to talk to a counselor about the abuse? No   How often in the past 2 weeks have you felt little interest or pleasure in doing things? Not at all   Over the past 2 weeks how often have you felt down, depressed, or hopeless? Not at all       Sleep History Reviewed With Patient 5/10/2022   How many hours do you sleep at night? 5   Do you think that you snore loudly or has anybody ever heard you snore loudly (louder than talking or so loud it can be heard behind a shut door)? No   Has anyone seen or heard you stop breathing during your sleep? No   Do you often feel tired, fatigued, or sleepy during the day? Yes   Do you have a TV/Computer in your bedroom? No       MEDICATIONS:   Current Outpatient Medications   Medication Sig Dispense Refill     Elastic Bandages & Supports (ACE ABDOMEN SURGICAL BINDER) MISC 1 Units daily 1 each 0     norethindrone (MICRONOR) 0.35 MG tablet Take 1 tablet (0.35 mg) by mouth daily 84 tablet 3     omeprazole (PRILOSEC) 20 MG DR capsule Take 1 capsule (20 mg) by mouth 2 times daily 180 capsule 3     ondansetron (ZOFRAN-ODT) 4 MG ODT tab Take 1 tablet (4 mg) by mouth every 8 hours as needed for nausea 30 tablet 3     SYNTHROID 112 MCG tablet Take 1 tablet (112 mcg) by mouth daily 90 tablet 3     vitamin D3 (CHOLECALCIFEROL) 50 mcg (2000 units) tablet Take 1 tablet (50 mcg) by mouth daily 90 tablet 3       ALLERGIES:   Allergies   Allergen Reactions     Ciprofloxacin      Levothyroxine Swelling     Phentermine Other (See Comments)     ROS  General  Fatigue: yes  HEENT  Hx of glaucoma: no  Vision changes: no  Cardiovascular  Hx of heart disease: no  Chest Pain  with Exertion: no  Palpitations: no  Pulmonary  Shortness of breath at rest: no  Shortness of breath with exertion: if she runs up the stairs  Stop-bang score: not done  [place] Score: not done  Gastrointestinal  Heartburn: yes  Psychiatric  Moods Stable: yes  Endocrine  Polydipsia: no  No personal or family history of medullary thyroid cancer: no  Neurologic  Hx of seizures: no  Migraine headaches: no    Birth control: micronor    PHYSICAL EXAM:  GENERAL: Healthy, alert and no distress  EYES: Eyes grossly normal to inspection.  No discharge or erythema, or obvious scleral/conjunctival abnormalities.  RESP: No audible wheeze, cough, or visible cyanosis.  No visible retractions or increased work of breathing.    SKIN: Visible skin clear. No significant rash, abnormal pigmentation or lesions.  NEURO: Cranial nerves grossly intact.  Mentation and speech appropriate for age.  PSYCH: Mentation appears normal, affect normal/bright, judgement and insight intact, normal speech and appearance well-groomed.    FOLLOW-UP:   1 week with our dietician and in 12 weeks with myself.    Total time spent on the date of this encounter doing: chart review, review of test results, patient visit, physical exam, education, counseling, developing plan of care and documenting = 75 minutes.    Sincerely,    JUDSON Grace MD          Video-Visit Details    Type of service:  Video Visit    Video End Time (time video stopped): 3:01 PM  Originating Location (pt. Location): Home    Distant Location (provider location):  [facility] [place]     Platform used for Video Visit: [employer]

## 2022-05-10 NOTE — LETTER
5/10/2022         RE: Laila Powell  [address]  [facility] [address]        Dear Colleague,    Thank you for referring your patient, Laila Powell, to the [facility] SURGERY CLINIC AND BARIATRICS CARE [place]. Please see a copy of my visit note below.    Laila Powell is 31 year old  female who presents for a billable video visit today.    How would you like to obtain your AVS? MyChart  If dropped from the video visit, the video invitation should be resent by: Send to e-mail at: [email or web address]  Will anyone else be joining your video visit? No      Video Start Time: 2:15 pm    Are there any specific questions or needs that you would like addressed at your visit today?    Initial Non Surgical Consult with Bariatrician. Pt is not interested in surgery at this time. She has tried different methods for weight loss including medication and shakes. The shakes made her feel bloated. She also tried Phentermine for 3 months and lost 5-10 pounds but didn't like the side effects. She had headaches and felt jittery. It also made her feel hungry. Pt did have a mommy make-over 10/2020 and developed a seroma and had a revision in 2021.     Pt is feeling much better after the revision and ready to lose weight again.      Jesus [place] CMA  [facility]  Surgery Clinic [facility]  Weight Management Clinic [address]  [place] [address]  Office: [phone]  Fax: [phone]          Provider Notes:        New Medical Weight Management Consult    PATIENT:  Laila Powell  MRN:         [id number]  :         1990  ALEXANDRA:         5/10/2022    Dear Dr. Sherman,    I had the pleasure of seeing your patient, Laila Powell. Full intake/assessment was done to determine barriers to weight loss success and develop a treatment plan. Laila Powell is a 31 year old female interested in treatment of medical problems associated with excess weight.  "She has a height of 4' 11\", a weight of 143 lbs 0 oz, and the calculated Body mass index is 28.88 kg/m .    ASSESSMENT/PLAN:  1. Overweight  We discussed healthy habits to assist with weight loss. She will work on planning meals ahead of time using the plate method for portion control and macronutrient proportions. She will try to increase her exercise. We discussed medication that may assist with weight loss. Semiglutide was prescribed. Risks/ benefits and possible side effects were discussed and questions were answered. Written information was given. If this is not covered she will try metformin.      2. Gastroesophageal reflux disease, unspecified whether esophagitis present  This may improve with healthy habits and weight loss.    3. Polycystic ovaries  We discussed insulin resistance and how it can interfere with weight loss efforts. We discussed dietary changes and exercise to reduce insulin resistance. We discussed the use of metformin. We will try this if we cannot get coverage for a GLP1 agonist.    She has the following co-morbidities:       5/10/2022   I have the following health issues associated with obesity: Polycystic Ovarian Syndrome, GERD (Reflux), Hypothyroidism   I have the following symptoms associated with obesity: Irregular Menstral Cycle       Patient Goals 5/10/2022   I am interested in having a healthier weight to diminish current health problems: Yes   I am interested in having a healthier weight in order to prevent future health problems: Yes   I am interested in having a healthier weight in order to have a future surgery: No       Referring Provider 5/10/2022   Please name the provider who referred you to Medical Weight Management.  If you do not know, please answer: \"I Don't Know\". Dr. Lyndon Sherman       Weight History 5/10/2022   How concerned are you about your weight? Very Concerned   Would you describe your weight gain as gradual? No   I became overweight: In College   The following " factors have contributed to my weight gain:  Change in Schedule, Started on Medication that Caused Weight Gain, Eating Wrong Types of Food, Eating Too Much, Other   Please list the other factors.  Thyroid   I have tried the following methods to lose weight: Watching Portions or Calories, Exercise, Nutrisystem, Slim Fast or Other Liquid Diets- made her bloating or tired, Medications, Meal Replacements, Fasting   Please list the medications you have tried.  Phentermine- jittery, headaches, stopped helping   My lowest weight since age 17 was: 95   My highest weight since age 17 was: 149   The most weight I have ever lost was: (lbs) 10   Has anyone in your family had weight loss surgery? No   How has your weight changed over the last year?  Gained       Diet Recall Review with Patient 5/10/2022   Do you typically eat breakfast? Occasional toast, lately just drinks coffee   Do you typically eat lunch? Yes   If you do eat lunch, what types of food do you typically eat?  Easy prep food microwave food, unless I cook at from chicken breast and vegetable, frozen meal   Do you typically eat supper? Yes   If you do eat supper, what types of food do you typically eat? Rice, chicken, shrimp, sometimes vegetables   Do you typically eat snacks? Yes- not too often   If you do snack, what types of food do you typically eat? Craves sugar- rice krispie or granola bar   Do you like vegetables?  Yes   Do you drink water? Yes- 64 oz plus   How many glasses of juice do you drink in a typical day? none   How many of glasses of milk do you drink in a typical day? 0   If you do drink milk, what type? N/A   How many 8oz glasses of sugar containing drinks such as Ray-Aid/sweet tea do you drink in a day? 0   How many cans/bottles of sugar pop/soda/tea/sports drinks do you drink in a day? When she goes out to eat   How many cans/bottles of diet pop/soda/tea or sports drink do you drink in a day? 0   How often do you have a drink of alcohol?  Never     Meals not prepared at home per week: 0-1    Eating Habits 5/10/2022   Generally, my meals include foods like these: bread, pasta, rice, potatoes, corn, crackers, sweet dessert, pop, or juice. A Few Times a Week   Generally, my meals include foods like these: fried meats, brats, burgers, french fries, pizza, cheese, chips, or ice cream. A Few Times a Week   Eat fast food (like [employer], [employer], Taco Bell). Less Than Weekly   Eat at a buffet or sit-down restaurant. Less Than Weekly   Eat most of my meals in front of the TV or computer. Less Than Weekly   Often skip meals, eat at random times, have no regular eating times. Once a Week   Rarely sit down for a meal but snack or graze throughout.  Once a Week   Eat extra snacks between meals. A Few Times a Week   Eat most of my food at the end of the day. A Few Times a Week   Eat in the middle of the night or wake up at night to eat. Once a Week   Eat extra snacks to prevent or correct low blood sugar. A Few Times a Week   Eat to prevent acid reflux or stomach pain. A Few Times a Week   Worry about not having enough food to eat. Never   Have you been to the food shelf at least a few times this year? No   I eat when I am depressed. Never   I eat when I am stressed. Never   I eat when I am bored. Almost Everyday   I eat when I am anxious. Never   I eat when I am happy or as a reward. A Few Times a Week   I feel hungry all the time even if I just have eaten. Almost Everyday   Feeling full is important to me. Almost Everyday   I finish all the food on my plate even if I am already full. Almost Everyday   I can't resist eating delicious food or walk past the good food/smell. Almost Everyday   I eat/snack without noticing that I am eating. Never   I eat when I am preparing the meal. Everyday   I eat more than usual when I see others eating. A Few Times a Week   I have trouble not eating sweets, ice cream, cookies, or chips if they are around the house. Once a  Week   I think about food all day. Everyday   What foods, if any, do you crave? Sweets/Candy/Chocolate   Please list any other foods you crave? Everything       Amount of Food 5/10/2022   I make myself vomit what I have eaten or use laxatives to get rid of food. Never   I eat a large amount of food, like a loaf of bread, a box of cookies, a pint/quart of ice cream, all at once. Almost Everyday   I eat a large amount of food even when I am not hungry. Almost Everyday   I eat rapidly. Almost Everyday   I eat alone because I feel embarrassed and do not want others to see how much I have eaten. Never   I eat until I am uncomfortably full. Never   I feel bad, disgusted, or guilty after I overeat. Weekly   I make myself vomit what I have eaten or use laxatives to get rid of food. Never       Activity/Exercise History 5/10/2022   How much of a typical 12 hour day do you spend sitting? Less Than Half the Day   How much of a typical 12 hour day do you spend lying down? Less Than Half the Day   How much of a typical day do you spend walking/standing? Most of the Day   How many hours (not including work) do you spend on the TV/Video Games/Computer/Tablet/Phone? 1 Hour or Less   How many times a week are you active for the purpose of exercise? 2-3 Times a Week, walks with the kids outside   What keeps you from being more active? Lack of Time, Too tired   How many total minutes do you spend doing some activity for the purpose of exercising when you exercise? 15-30 Minutes       PAST MEDICAL HISTORY:  Past Medical History:   Diagnosis Date     Acute cystitis      Anemia 11/4/2013     Anemia      Candidiasis of vulva and vagina      Disease of thyroid gland      Dizziness      Headache      Hypothyroidism 11/4/2013     Hypothyroidism      Microscopic hematuria      Pharyngitis      Reflux gastritis      UTI (urinary tract infection)        Work/Social History Reviewed With Patient 5/10/2022   My employment status is: Full-Time    My job is: Medical Assistant   How much of your job is spent on the computer or phone? 75%   How many hours do you spend commuting to work daily?  2   What is your marital status? Single   If in a relationship, is your significant other overweight? No   Do you have children? Yes   If you have children, are they overweight? No   Who do you live with?  My child   Who does the food shopping?  Myself       Mental Health History Reviewed With Patient 5/10/2022   Have you ever been physically or sexually abused? No   If yes, do you feel that the abuse is affecting your weight? No   If yes, would you like to talk to a counselor about the abuse? No   How often in the past 2 weeks have you felt little interest or pleasure in doing things? Not at all   Over the past 2 weeks how often have you felt down, depressed, or hopeless? Not at all       Sleep History Reviewed With Patient 5/10/2022   How many hours do you sleep at night? 5   Do you think that you snore loudly or has anybody ever heard you snore loudly (louder than talking or so loud it can be heard behind a shut door)? No   Has anyone seen or heard you stop breathing during your sleep? No   Do you often feel tired, fatigued, or sleepy during the day? Yes   Do you have a TV/Computer in your bedroom? No       MEDICATIONS:   Current Outpatient Medications   Medication Sig Dispense Refill     Elastic Bandages & Supports (ACE ABDOMEN SURGICAL BINDER) MISC 1 Units daily 1 each 0     norethindrone (MICRONOR) 0.35 MG tablet Take 1 tablet (0.35 mg) by mouth daily 84 tablet 3     omeprazole (PRILOSEC) 20 MG DR capsule Take 1 capsule (20 mg) by mouth 2 times daily 180 capsule 3     ondansetron (ZOFRAN-ODT) 4 MG ODT tab Take 1 tablet (4 mg) by mouth every 8 hours as needed for nausea 30 tablet 3     SYNTHROID 112 MCG tablet Take 1 tablet (112 mcg) by mouth daily 90 tablet 3     vitamin D3 (CHOLECALCIFEROL) 50 mcg (2000 units) tablet Take 1 tablet (50 mcg) by mouth daily 90  tablet 3       ALLERGIES:   Allergies   Allergen Reactions     Ciprofloxacin      Levothyroxine Swelling     Phentermine Other (See Comments)     ROS  General  Fatigue: yes  HEENT  Hx of glaucoma: no  Vision changes: no  Cardiovascular  Hx of heart disease: no  Chest Pain with Exertion: no  Palpitations: no  Pulmonary  Shortness of breath at rest: no  Shortness of breath with exertion: if she runs up the stairs  Stop-bang score: not done  [place] Score: not done  Gastrointestinal  Heartburn: yes  Psychiatric  Moods Stable: yes  Endocrine  Polydipsia: no  No personal or family history of medullary thyroid cancer: no  Neurologic  Hx of seizures: no  Migraine headaches: no    Birth control: micronor    PHYSICAL EXAM:  GENERAL: Healthy, alert and no distress  EYES: Eyes grossly normal to inspection.  No discharge or erythema, or obvious scleral/conjunctival abnormalities.  RESP: No audible wheeze, cough, or visible cyanosis.  No visible retractions or increased work of breathing.    SKIN: Visible skin clear. No significant rash, abnormal pigmentation or lesions.  NEURO: Cranial nerves grossly intact.  Mentation and speech appropriate for age.  PSYCH: Mentation appears normal, affect normal/bright, judgement and insight intact, normal speech and appearance well-groomed.    FOLLOW-UP:   1 week with our dietician and in 12 weeks with myself.    Total time spent on the date of this encounter doing: chart review, review of test results, patient visit, physical exam, education, counseling, developing plan of care and documenting = *** minutes.    Sincerely,    JUDSON Grace MD          Video-Visit Details    Type of service:  Video Visit    Video End Time (time video stopped): 3:01 PM  Originating Location (pt. Location): Home    Distant Location (provider location):  [facility] SURGERY [facility] AND BARIATRICS [facility]     Platform used for Video Visit: AmWell        Again, thank you for allowing me to  participate in the care of your patient.        Sincerely,        JUDSON Grace MD

## 2022-05-10 NOTE — PATIENT INSTRUCTIONS
Here are the [employer] exercise sites: Yoga-https://www.Shiny Adsube.com/user/yogawithadriene    Body strength activities, dance, high intensity interval training- https://www.[employer].com/user/popsugartvfit    Strength training- https://www.[employer].com/user/KozakSportsPerform    Walking- https://www.[employer].com/user/walkathomemedia    Sit and Be Fit- https://www.[employer].Muecs/channel/UCLgvL3aGzMByecNYtMcyK_g    Low impact cardio- Lissa Butler- https://www.[employer].Muecs/channel/ZZbwYSeGbxuDhfaY3gjqjwnX    Cardio Nani Ervin- https://www.[employer].Muecs/channel/XSVeGgxn7KIcHBLLZ3qLsERl    30 Min low impact cardio- https://www.Shiny Adsube.com/watch?v=gC_L9qAHVJ8    Body Project- https://www.[employer].Muecs/channel/ELCej5B44-MqLcWXqSfxM7JG                                                   Eat Better ? Move More ? Live Well    Eat 3 nutrient-rich meals each day    Don t skip meals--it will cause you to overeat later in the day!    Eating fiber (vegetables/fruits/whole grains) and protein with meals helps you stay full longer    Choose foods with less than 10 grams of sugar and 5 grams of fat per serving to prevent excess calories and weight re-gain  Eat around the same times each day to develop a routine eating schedule   Avoid snacking unless physically hungry.   Planned snacks: 1-2 times per day and no more than 150 calories    Eat protein first   Protein helps with healing, maintaining adequate muscle mass, reducing hunger and optimizing nutritional status   Aim for 60-80 grams of protein per day   Fill up on Fiber   Fiber comes from plants--fruits, veggies, whole grains, nuts/seeds and beans   Fiber is low in calories, high in phytonutrients and helps you stay full longer   Aim for 25-35 grams per day by eating fiber with meals and snacks  Eat S-[id number]   Take 20-30 minutes to eat each meal by taking small bites, chewing foods to applesauce consistency or 20-30 times before you swallow   Eating foods too fast can delay satiety/fullness  signals and increase overeating   Slow down your eating by using toddler utensils, putting your fork/spoon down between bites and not watching TV or emailing during meals!   Keep a Journal         Writing down what you eat, how you feel and when you are active helps you identify new changes to work on from week to week         Look for ways to cut 100 calories from your current diet 2-3 times per day  Drink 64 ounces of 0-Calorie drinks between meals   Water   Zero calorie Propel  or Vitamin Water     SoBe [place]ater  Zero Calories   Crystal Light , Sugar-Free Ray-Aid , and other sugar-free lemonade or flavored jacinto   Keep Caffeine to less than 300mg per day ie: 3-6oz cups coffee     Work up to 45-60 minutes of physical activity most days of the week   Helps with losing weight and prevent regaining those extra pounds!    Do a combo of cardio (walking/water exercises) and strength training (lifting weights/Vinyasa yoga)    Avoid Mindless Eating   Be present when you eat--take note of the smell, taste and quality of your food   Make a list of alternative activities you could do to prevent eating out of boredom/stress  Go for a walk, call a friend, chew gum, paint your nails, re-organize the garage, etc    LEAN PROTEIN SOURCES      Protein Source Portion Calories Grams of Protein                           Nonfat, plain Greek yogurt    (10 grams sugar or less) 3/4 cup (6 oz)  12-17   Light Yogurt (10 grams sugar or less) 3/4 cup (6 oz)  6-8   Protein Shake 1 shake 110-180 15-30   Skim/1% Milk or lactose-free milk 1 cup ( 8 oz)  8   Plain or light, flavored soymilk 1 cup  7-8   Plain or light, hemp milk 1 cup 110 6   Fat Free or 1% Cottage Cheese 1/2 cup 90 15   Part skim ricotta cheese 1/2 cup 100 14   Part skim or reduced fat cheese slices 1 ounce 65-80 8     Mozzarella String Cheese 1 80 8   Canned tuna, chicken, crab or salmon  (canned in water)  1/2 cup 100 15-20   White fish (broiled,  grilled, baked) 3 ounces 100 21   [place]ams/Tuna (broiled, grilled, baked) 3 ounces 150-180 21   Shrimp, Scallops, Lobster, Crab 3 ounces 100 21   Pork loin, Pork Tenderloin 3 ounces 150 21   Boneless, skinless chicken /turkey breast                          (broiled, grilled, baked) 3 ounces 120 21   [place], [place], [place], and Venison 3 ounces 120 21   Lean cuts of red meat and pork (sirloin,   round, tenderloin, flank, ground 93%-96%) 3 ounces 170 21   Lean or Extra Lean Ground Turkey 1/2 cup 150 20   90-95% Lean [place] Burger 1 consuelo 140-180 21   Low-fat casserole with lean meat 3/4 cup 200 17   Luncheon Meats                                                        (turkey, lean ham, roast beef, chicken) 3 ounces 100 21   Egg (boiled, poached, scrambled) 1 Egg 60 7   Egg Substitute 1/2 cup 70 10   Nuts (limit to 1 serving per day)  3 Tbsp. 150 7   Nut [place] (peanut, almond)  Limit to 1 serving or less daily 1 Tbsp. 90 4   Soy Burger (varies) 1  15   Garbanzo, Black, Palubmo Beans 1/2 cup 110 7   Refried Beans 1/2 cup 100 7   Kidney and Lima beans 1/2 cup 110 7   Tempeh 3 oz 175 18   Vegan crumbles 1/2 cup 100 14   Tofu 1/2 cup 110 14   Chili (beans and extra lean beef or turkey) 1 cup 200 23   Lentil Stew/Soup 1 cup 150 12   Black Bean Soup 1 cup 175 12

## 2022-05-11 ENCOUNTER — TELEPHONE (OUTPATIENT)
Dept: SURGERY | Facility: CLINIC | Age: 32
End: 2022-05-11
Payer: COMMERCIAL

## 2022-05-11 DIAGNOSIS — E66.3 OVERWEIGHT: Primary | ICD-10-CM

## 2022-05-11 RX ORDER — METFORMIN HCL 500 MG
TABLET, EXTENDED RELEASE 24 HR ORAL
Qty: 180 TABLET | Refills: 1 | Status: SHIPPED | OUTPATIENT
Start: 2022-05-11 | End: 2022-10-03

## 2022-05-11 NOTE — TELEPHONE ENCOUNTER
Central Prior Authorization Team   Phone: [phone]    Medication Department: [facility] GENERAL SURG BARIATRIC    PRIOR AUTHORIZATION DENIED    Medication: semaglutide (OZEMPIC) 2 MG/1.5ML SOPN pen - EPA DENIED     Denial Date: 5/11/2022    Denial Rational: CMS REFERENCE DOES NOT SUPPORT THE USE OF WEIGHT LOSS FOR THE REQUESTED DRUG.           Appeal Information: If the Provider/Patient would like to appeal this denial, please provide a letter of medical necessity explaining why Preferred Formulary medications are not appropriate in the treatment of the patient's condition; along with any previous therapies tried/failed.    Once completed, please route back to me directly: Adali Shepard

## 2022-06-27 NOTE — PROGRESS NOTES
Arlyn is a 76 [facility]   at the [facility]   Otolaryngology Clinic     Patient: Arlyn Stevens    MRN: [id number]    : 1946    Age/Gender: 76 Preceptor Attestation:    I discussed the patient with the resident. I have verified the content of the note, which accurately reflects my assessment of the patient and the plan of care.   Supervising Physician:  Rod Bui MD.        Transbronchial Needle Aspiration;  Surgeon: Marisa Fischer [phone] Lianet padilla - [phone] - [facility])       RETURN VISIT: as needed      Rochelle Clifford MD    Laryngology    [facility]  Department of  Otolaryngology - Head and Neck Surgery  [facility]  [address] [place] [address]  Appointment line: [phone]  Fax: [phone]     10 minutes spent on the date of the encounter doing chart review, patient visit and discussion with other provider(s)

## 2022-07-19 ENCOUNTER — LAB (OUTPATIENT)
Dept: LAB | Facility: CLINIC | Age: 32
End: 2022-07-19
Payer: COMMERCIAL

## 2022-07-19 DIAGNOSIS — E66.3 OVERWEIGHT (BMI 25.0-29.9): ICD-10-CM

## 2022-07-19 LAB
ALBUMIN SERPL-MCNC: 3.8 G/DL (ref 3.4–5)
ALP SERPL-CCNC: 45 U/L (ref 40–150)
ALT SERPL W P-5'-P-CCNC: 20 U/L (ref 0–50)
ANION GAP SERPL CALCULATED.3IONS-SCNC: 6 MMOL/L (ref 3–14)
AST SERPL W P-5'-P-CCNC: 11 U/L (ref 0–45)
BILIRUB SERPL-MCNC: 0.6 MG/DL (ref 0.2–1.3)
BUN SERPL-MCNC: 13 MG/DL (ref 7–30)
CALCIUM SERPL-MCNC: 9.1 MG/DL (ref 8.5–10.1)
CHLORIDE BLD-SCNC: 108 MMOL/L (ref 94–109)
CO2 SERPL-SCNC: 26 MMOL/L (ref 20–32)
CREAT SERPL-MCNC: 0.6 MG/DL (ref 0.52–1.04)
GFR SERPL CREATININE-BSD FRML MDRD: >90 ML/MIN/1.73M2
GLUCOSE BLD-MCNC: 108 MG/DL (ref 70–99)
HBA1C MFR BLD: 5.4 % (ref 0–5.6)
POTASSIUM BLD-SCNC: 3.8 MMOL/L (ref 3.4–5.3)
PROT SERPL-MCNC: 7.2 G/DL (ref 6.8–8.8)
SODIUM SERPL-SCNC: 140 MMOL/L (ref 133–144)
TSH SERPL DL<=0.005 MIU/L-ACNC: 2.8 MU/L (ref 0.4–4)

## 2022-07-19 PROCEDURE — 84443 ASSAY THYROID STIM HORMONE: CPT

## 2022-07-19 PROCEDURE — 36415 COLL VENOUS BLD VENIPUNCTURE: CPT

## 2022-07-19 PROCEDURE — 80053 COMPREHEN METABOLIC PANEL: CPT

## 2022-07-19 PROCEDURE — 83036 HEMOGLOBIN GLYCOSYLATED A1C: CPT

## 2022-07-21 DIAGNOSIS — E66.3 OVERWEIGHT (BMI 25.0-29.9): Primary | ICD-10-CM

## 2022-09-26 DIAGNOSIS — E03.9 HYPOTHYROIDISM, UNSPECIFIED TYPE: ICD-10-CM

## 2022-09-26 RX ORDER — LEVOTHYROXINE SODIUM 112 MCG
112 TABLET ORAL DAILY
Qty: 90 TABLET | Refills: 3 | Status: SHIPPED | OUTPATIENT
Start: 2022-09-26

## 2022-10-02 NOTE — PROGRESS NOTES
Bariatric Care Clinic Non Surgical Follow up Visit   Date of visit: 10/3/2022  Physician: JUDSON Grace MD, MD  Primary Care is Dee Winchester  Laila Sam Powell   31 year old  female     Initial Weight: 143#  Initial BMI: 28.88  Today's Weight:   Wt Readings from Last 1 Encounters:   10/03/22 63.5 kg (140 lb)     Body mass index is 28.28 kg/m .           Assessment and Plan   Assessment: Laila is a 31 year old year old female who presents for medical weight management.      Plan:    1. Overweight (BMI 25.0-29.9)  Patient was congratulated on her success thus far. Healthy habits to assist with further weight loss were discussed. She will continue the saxenda.     2. Polycystic ovaries  This may improve with healthy habits and weight loss.    Follow up in 3 months with myself           INTERIM HISTORY  We tried to prescribe ozempic at her initial visit 5/11/22. It was not covered by insurance so saxenda was prescribed. She thinks it is helping to control her hunger a little bit. She has noticed bumps on her forehead since starting it. She did not notice any worsening of her heartburn when she started it. She is more gassy but is having bowl movements every day.    DIETARY HISTORY  Meals Per Day: 3  Eating Protein First?: yes  Food Diary: B:protein shake L:broccoli and steak and rice D:similar to lunch  Snacks Per Day: 2 most days  Typical Snack: ethan bar, fruit  Fluid Intake: 64 oz  Portion Control: reduced portions of rice  Calorie Containing Beverages: none  Typical Protein Food Choices: protein shake, meat  Choosing Whole Grains: sometimes  Meals at Restaurant per week:none    Positive Changes Since Last Visit: reduced portions of rice, stopped eating out, stopped sugared beverages  Struggling With: frustrated that she hasn't lost more weight     Knowledgeable in Reading Food Labels: not sure  Getting Adequate Protein: yes      PHYSICAL ACTIVITY PATTERNS:  You tube cardio and walking    REVIEW OF  "SYSTEMS  GENERAL/CONSTITUTIONAL:  Fatigue: sometimes  HEENT:  Vision changes, glaucoma: no  CARDIOVASCULAR:  Chest Pain with Exertion: no  PULMONARY:  Dyspnea on exertion: sometimes  PSYCHIATRIC:  Moods: stable  ENDOCRINE:  Monitoring Blood Sugars: no  Sugars Well Controlled: no  No personal or family history of medullary thyroid cancer no  :  Birth control: provera       Patient Profile   Social History     Social History Narrative    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         Past Medical History   Past Medical History:   Diagnosis Date     Acute cystitis      Anemia 11/4/2013     Anemia      Candidiasis of vulva and vagina      Disease of thyroid gland      Dizziness      Headache      Hypothyroidism 11/4/2013     Hypothyroidism      Microscopic hematuria      Pharyngitis      Reflux gastritis      UTI (urinary tract infection)      Patient Active Problem List   Diagnosis     Health Care Home     Microscopic hematuria     Hypothyroidism     Gastroesophageal reflux disease, esophagitis presence not specified     Vitamin D deficiency     Class 1 obesity due to excess calories without serious comorbidity with body mass index (BMI) of 31.0 to 31.9 in adult     Polycystic ovaries     Female infertility associated with anovulation     Autoimmune thyroiditis     Missed period       Past Surgical History  She has a past surgical history that includes Cholecystectomy (2013); ------------other------------- (10/06/2020); IR Abscess Drain Insertion (3/1/2021); IR Abscess Tube Change (3/9/2021); IR Abscess Drain Insertion (3/31/2021); Cholecystectomy; c/section, low transverse; Combined Augmentation Mammaplasty And Abdominoplasty; other surgical history; Ir Abscess Drain Insertion (3/1/2021); IR Abscess Drain Exchange (3/9/2021); and Ir Abscess Drain Insertion (3/31/2021).     Examination   Ht 1.499 m (4' 11\")   Wt 63.5 kg (140 lb)   BMI 28.28 kg/m  "   GENERAL: Healthy, alert and no distress  EYES: Eyes grossly normal to inspection.  No discharge or erythema, or obvious scleral/conjunctival abnormalities.  RESP: No audible wheeze, cough, or visible cyanosis.  No visible retractions or increased work of breathing.    SKIN: Visible skin clear. No significant rash, abnormal pigmentation or lesions.  NEURO: Cranial nerves grossly intact.  Mentation and speech appropriate for age.  PSYCH: Mentation appears normal, affect normal/bright, judgement and insight intact, normal speech and appearance well-groomed.       Counseling:   We reviewed the important post op bariatric recommendations:  -eating 3 meals daily  -eating protein first, getting >60gm protein daily  -eating slowly, chewing food well  -avoiding/limiting calorie containing beverages  -limiting starchy vegetables and carbohydrates, choosing wheat, not white with breads,   crackers, pastas, barbara, bagels, tortillas, rice  -limiting restaurant or cafeteria eating to twice a week or less    We discussed the importance of restorative sleep and stress management in maintaining a healthy weight.  We discussed the National Weight Control Registry healthy weight maintenance strategies and ways to optimize metabolism.  We discussed the importance of physical activity including cardiovascular and strength training in maintaining a healthier weight.    Total time spent on the date of this encounter doing: chart review, review of test results, patient visit, physical exam, education, counseling, developing plan of care and documenting = 41 minutes.         JUDSON Grace MD  [facility]

## 2022-10-03 ENCOUNTER — VIRTUAL VISIT (OUTPATIENT)
Dept: SURGERY | Facility: CLINIC | Age: 32
End: 2022-10-03
Payer: COMMERCIAL

## 2022-10-03 VITALS — WEIGHT: 140 LBS | HEIGHT: 59 IN | BODY MASS INDEX: 28.22 KG/M2

## 2022-10-03 DIAGNOSIS — E66.3 OVERWEIGHT (BMI 25.0-29.9): Primary | ICD-10-CM

## 2022-10-03 DIAGNOSIS — E28.2 POLYCYSTIC OVARIES: ICD-10-CM

## 2022-10-03 PROCEDURE — 99215 OFFICE O/P EST HI 40 MIN: CPT | Mod: 95 | Performed by: FAMILY MEDICINE

## 2022-10-03 RX ORDER — LIRAGLUTIDE 6 MG/ML
3 INJECTION, SOLUTION SUBCUTANEOUS DAILY
Qty: 45 ML | Refills: 0 | Status: SHIPPED | OUTPATIENT
Start: 2022-10-03 | End: 2022-12-10

## 2022-10-03 NOTE — PROGRESS NOTES
Laila Powell is 31 year old  female who presents for a billable video visit today.    How would you like to obtain your AVS? MyChart  If dropped from the video visit, the video invitation should be resent by: Text to cell phone: [phone]  Will anyone else be joining your video visit? No      Video Start Time: 11:15 AM      Video-Visit Details    Type of service:  Video Visit    Video End Time (time video stopped): 11:38 AM  Originating Location (pt. Location): Home    Distant Location (provider location):  [facility] [place]     Platform used for Video Visit: [email or web address]

## 2022-10-03 NOTE — PATIENT INSTRUCTIONS

## 2022-10-03 NOTE — LETTER
10/3/2022         RE: Laila Powell  [address] [place] [place] [address]        Dear Colleague,    Thank you for referring your patient, Laila Powell, to the [facility] [place]. Please see a copy of my visit note below.    Bariatric Care Clinic Non Surgical Follow up Visit   Date of visit: 10/3/2022  Physician: JUDSON Grace MD, MD  Primary Care is Dee Winchester  Laila Powell   31 year old  female     Initial Weight: 143#  Initial BMI: 28.88  Today's Weight:   Wt Readings from Last 1 Encounters:   10/03/22 63.5 kg (140 lb)     Body mass index is 28.28 kg/m .           Assessment and Plan   Assessment: Laila is a 31 year old year old female who presents for medical weight management.      Plan:    1. Overweight (BMI 25.0-29.9)  Patient was congratulated on her success thus far. Healthy habits to assist with further weight loss were discussed. She will continue the saxenda.     2. Polycystic ovaries  This may improve with healthy habits and weight loss.    Follow up in 3 months with myself           INTERIM HISTORY  We tried to prescribe ozempic at her initial visit 5/11/22. It was not covered by insurance so saxenda was prescribed. She thinks it is helping to control her hunger a little bit. She has noticed bumps on her forehead since starting it. She did not notice any worsening of her heartburn when she started it. She is more gassy but is having bowl movements every day.    DIETARY HISTORY  Meals Per Day: 3  Eating Protein First?: yes  Food Diary: B:protein shake L:broccoli and steak and rice D:similar to lunch  Snacks Per Day: 2 most days  Typical Snack: ethan bar, fruit  Fluid Intake: 64 oz  Portion Control: reduced portions of rice  Calorie Containing Beverages: none  Typical Protein Food Choices: protein shake, meat  Choosing Whole Grains: sometimes  Meals at Restaurant per week:none    Positive Changes Since Last Visit: reduced portions of rice, stopped  eating out, stopped sugared beverages  Struggling With: frustrated that she hasn't lost more weight     Knowledgeable in Reading Food Labels: not sure  Getting Adequate Protein: yes      PHYSICAL ACTIVITY PATTERNS:  You tube cardio and walking    REVIEW OF SYSTEMS  GENERAL/CONSTITUTIONAL:  Fatigue: sometimes  HEENT:  Vision changes, glaucoma: no  CARDIOVASCULAR:  Chest Pain with Exertion: no  PULMONARY:  Dyspnea on exertion: sometimes  PSYCHIATRIC:  Moods: stable  ENDOCRINE:  Monitoring Blood Sugars: no  Sugars Well Controlled: no  No personal or family history of medullary thyroid cancer no  :  Birth control: provera       Patient Profile   Social History     Social History Narrative    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         Past Medical History   Past Medical History:   Diagnosis Date     Acute cystitis      Anemia 11/4/2013     Anemia      Candidiasis of vulva and vagina      Disease of thyroid gland      Dizziness      Headache      Hypothyroidism 11/4/2013     Hypothyroidism      Microscopic hematuria      Pharyngitis      Reflux gastritis      UTI (urinary tract infection)      Patient Active Problem List   Diagnosis     Health Care Home     Microscopic hematuria     Hypothyroidism     Gastroesophageal reflux disease, esophagitis presence not specified     Vitamin D deficiency     Class 1 obesity due to excess calories without serious comorbidity with body mass index (BMI) of 31.0 to 31.9 in adult     Polycystic ovaries     Female infertility associated with anovulation     Autoimmune thyroiditis     Missed period       Past Surgical History  She has a past surgical history that includes Cholecystectomy (2013); ------------other------------- (10/06/2020); IR Abscess Drain Insertion (3/1/2021); IR Abscess Tube Change (3/9/2021); IR Abscess Drain Insertion (3/31/2021); Cholecystectomy; c/section, low transverse; Combined Augmentation  "Mammaplasty And Abdominoplasty; other surgical history; Ir Abscess Drain Insertion (3/1/2021); IR Abscess Drain Exchange (3/9/2021); and Ir Abscess Drain Insertion (3/31/2021).     Examination   Ht 1.499 m (4' 11\")   Wt 63.5 kg (140 lb)   BMI 28.28 kg/m    GENERAL: Healthy, alert and no distress  EYES: Eyes grossly normal to inspection.  No discharge or erythema, or obvious scleral/conjunctival abnormalities.  RESP: No audible wheeze, cough, or visible cyanosis.  No visible retractions or increased work of breathing.    SKIN: Visible skin clear. No significant rash, abnormal pigmentation or lesions.  NEURO: Cranial nerves grossly intact.  Mentation and speech appropriate for age.  PSYCH: Mentation appears normal, affect normal/bright, judgement and insight intact, normal speech and appearance well-groomed.       Counseling:   We reviewed the important post op bariatric recommendations:  -eating 3 meals daily  -eating protein first, getting >60gm protein daily  -eating slowly, chewing food well  -avoiding/limiting calorie containing beverages  -limiting starchy vegetables and carbohydrates, choosing wheat, not white with breads,   crackers, pastas, barbara, bagels, tortillas, rice  -limiting restaurant or cafeteria eating to twice a week or less    We discussed the importance of restorative sleep and stress management in maintaining a healthy weight.  We discussed the National Weight Control Registry healthy weight maintenance strategies and ways to optimize metabolism.  We discussed the importance of physical activity including cardiovascular and strength training in maintaining a healthier weight.    Total time spent on the date of this encounter doing: chart review, review of test results, patient visit, physical exam, education, counseling, developing plan of care and documenting = 41 minutes.         JUDSON Grace MD  [facility]             Laila Powell is 31 year old  female who " presents for a billable video visit today.    How would you like to obtain your AVS? MyChart  If dropped from the video visit, the video invitation should be resent by: Text to cell phone: [phone]  Will anyone else be joining your video visit? No      Video Start Time: 11:15 AM      Video-Visit Details    Type of service:  Video Visit    Video End Time (time video stopped): 11:38 AM  Originating Location (pt. Location): Home    Distant Location (provider location):  [facility] [place]     Platform used for Video Visit: Joseph      Again, thank you for allowing me to participate in the care of your patient.        Sincerely,        JUDSON Grace MD

## 2022-10-05 ENCOUNTER — TELEPHONE (OUTPATIENT)
Dept: SURGERY | Facility: CLINIC | Age: 32
End: 2022-10-05

## 2022-10-05 NOTE — TELEPHONE ENCOUNTER
Central Prior Authorization Team   Phone: [phone]      PA Initiation    Medication: liraglutide - Weight Management (SAXENDA) 18 MG/3ML pen - INITIATED  Insurance Company: YASMIN Hopkins - Phone [phone] Fax [phone]  Pharmacy Filling the Rx: [id number] - [place], [place] - [address] [facility] [address]  Filling Pharmacy Phone: [phone]  Filling Pharmacy Fax:    Start Date: 10/5/2022

## 2022-10-05 NOTE — TELEPHONE ENCOUNTER
Prior Authorization Retail Medication Request    Medication/Dose: Saxenda 18mg/3ml Pen-injectors    Key: [id number]    Pharmacy Information (if different than what is on RX)  Name:  [facility]  Phone:  [phone]

## 2022-10-06 NOTE — TELEPHONE ENCOUNTER
PRIOR AUTHORIZATION DENIED    Medication: liraglutide - Weight Management (SAXENDA) 18 MG/3ML pen - PA DENIED    Denial Date: 10/6/2022    Denial Rational: PATIENT MUST HAVE HAD ATLEAST 4% WEIGHT LOSS AND MAINTAINED SINCE STARTING THIS MEDICATION      Appeal Information: IF PROVIDER WOULD LIKE TO APPEAL THIS DECISION PLEASE PROVIDE PA TEAM WITH LETTER OF MEDICAL NECESSITY

## 2022-10-30 ENCOUNTER — HEALTH MAINTENANCE LETTER (OUTPATIENT)
Age: 32
End: 2022-10-30

## 2022-12-08 ENCOUNTER — TELEPHONE (OUTPATIENT)
Dept: SURGERY | Facility: CLINIC | Age: 32
End: 2022-12-08

## 2022-12-08 NOTE — TELEPHONE ENCOUNTER
Pharmacy called and because we received a PA request on the Saxenda from Laila.  This medication PA was denied back on October 6th due to her have had at least 4% weight loss and maintained since starting the medication.  She did end up getting the medication because her insurance would allow 30 days worth but not 90 days.  She is now needing a refill and would prefer that it is done through [facility] because it is closer but had to go through [facility] for a bit due to supplies available.   The [facility] pharmacy is asking for the PA to be submitted again.  She is having a lot of acne since she started the Saxenda as well which Dr. Davidson is aware.  Her primary care has given her some topical cream to help with this.  Her weight is now 135 lbs and it was 147 lbs to start.  She would prefer to take the Wegovy over the Saxenda if it is now available as well which I will ask Dr. Davidson about but for now will continue the Saxenda if she can get it.  She did call the [facility] and they said they have Wegovy but only at the 1.7mg and 2.4mg doses so far.  Rachel Cantu, RN

## 2022-12-09 NOTE — TELEPHONE ENCOUNTER
Central Prior Authorization Team - Phone: [phone]     Hi, looks like this medication was already denied on 10/06/2022. Unfortunately cannot submit a new PA, This insurance would require an appeal of the original denied PA to be done.  If the provider would like to appeal, please provide a letter of medical necessity and route back to the team using original encounter for documentation purposes. Otherwise you can close the encounter. Thank you, Central PA Team

## 2022-12-10 ENCOUNTER — MYC REFILL (OUTPATIENT)
Dept: SURGERY | Facility: CLINIC | Age: 32
End: 2022-12-10

## 2022-12-10 DIAGNOSIS — E66.3 OVERWEIGHT (BMI 25.0-29.9): ICD-10-CM

## 2022-12-15 RX ORDER — LIRAGLUTIDE 6 MG/ML
3 INJECTION, SOLUTION SUBCUTANEOUS DAILY
Qty: 45 ML | Refills: 0 | Status: SHIPPED | OUTPATIENT
Start: 2022-12-15 | End: 2023-01-02

## 2022-12-29 NOTE — PROGRESS NOTES
Bariatric Care Clinic Non Surgical Follow up Visit   Date of visit: 1/2/2023  Physician: JUDSON Grace MD, MD  Primary Care is Dee Winchester  Laila Sam Powell   32 year old  female     Initial weight 143#  Today's Weight:   Wt Readings from Last 1 Encounters:   01/02/23 61.2 kg (135 lb)     Body mass index is 27.27 kg/m .           Assessment and Plan   Assessment: Laila is a 32 year old year old female who presents for medical weight management.      Plan:    1. Overweight (BMI 25.0-29.9)  Patient was congratulated on her success thus far. Healthy habits to assist with further weight loss were discussed. She will work on getting adequate protein and vegetables. She will continue the saxenda. She would like to try switching to wegovy when it becomes available.     2. Polycystic ovaries  This may improve with healthy habits and weight loss.    Follow up in 3 months with myself           INTERIM HISTORY  Patient was taking saxenda but her insurance stopped covering it because she did not lose 7% of her body weight while taking it. She has now restarted saxenda at a new pharmacy. She is taking 3 mg per day. She thinks it is helping to control her appetite. She has been struggling with acne from it.     DIETARY HISTORY  Meals Per Day: 2  Eating Protein First?: usually  Food Diary: B:skipping, black coffee L:lean cuisine or smart ones frozen meal D:protein shake or soup or some protein source and vegetables  Snacks Per Day: 1-2  Typical Snack: mid moring, yogurt, sometimes fruit, granola bar occasionally  Fluid Intake: 64  Portion Control: yes  Calorie Containing Beverages: none  Typical Protein Food Choices: chicken, yogurt, shakeology protein shakes  Choosing Whole Grains: yes- brown rice sometimes  Meals at Restaurant per week:0-1    Positive Changes Since Last Visit: smaller portions  Struggling With: sleep    Knowledgeable in Reading Food Labels: yes  Getting Adequate Protein: working on it  Sleeping 7-8 hours/day  no, trying      PHYSICAL ACTIVITY PATTERNS:  Videos at home, cardio and strength 30-60 minutes 4-5 x per day    REVIEW OF SYSTEMS  GENERAL/CONSTITUTIONAL:  Fatigue: yes- but improved  HEENT:   glaucoma: no  PULMONARY:  Dyspnea on exertion: no  NEUROLOGIC:  Paresthesias: no  PSYCHIATRIC:  Moods: yes  ENDOCRINE:  Monitoring Blood Gohda0qix: na  Sugars Well Controlled: na  No personal or family history of medullary thyroid cancer no  :  Birth control: none- provera to induce period only       Patient Profile   Social History     Social History Narrative    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         Past Medical History   Past Medical History:   Diagnosis Date     Acute cystitis      Anemia 11/4/2013     Anemia      Candidiasis of vulva and vagina      Disease of thyroid gland      Dizziness      Headache      Hypothyroidism 11/4/2013     Hypothyroidism      Microscopic hematuria      Pharyngitis      Reflux gastritis      UTI (urinary tract infection)      Patient Active Problem List   Diagnosis     Health Care Home     Microscopic hematuria     Hypothyroidism     Gastroesophageal reflux disease, esophagitis presence not specified     Vitamin D deficiency     Class 1 obesity due to excess calories without serious comorbidity with body mass index (BMI) of 31.0 to 31.9 in adult     Polycystic ovaries     Female infertility associated with anovulation     Autoimmune thyroiditis     Missed period       Past Surgical History  She has a past surgical history that includes Cholecystectomy (2013); ------------other------------- (10/06/2020); IR Abscess Drain Insertion (3/1/2021); IR Abscess Tube Change (3/9/2021); IR Abscess Drain Insertion (3/31/2021); Cholecystectomy; c/section, low transverse; Combined Augmentation Mammaplasty And Abdominoplasty; other surgical history; Ir Abscess Drain Insertion (3/1/2021); IR Abscess Drain Exchange (3/9/2021); and Ir  "Abscess Drain Insertion (3/31/2021).     Examination   Ht 1.499 m (4' 11\")   Wt 61.2 kg (135 lb)   BMI 27.27 kg/m    GENERAL: Healthy, alert and no distress  EYES: Eyes grossly normal to inspection.  No discharge or erythema, or obvious scleral/conjunctival abnormalities.  RESP: No audible wheeze, cough, or visible cyanosis.  No visible retractions or increased work of breathing.    SKIN: Visible skin clear. No significant rash, abnormal pigmentation or lesions.  NEURO: Cranial nerves grossly intact.  Mentation and speech appropriate for age.  PSYCH: Mentation appears normal, affect normal/bright, judgement and insight intact, normal speech and appearance well-groomed.     Counseling:   We reviewed the important post op bariatric recommendations:  -eating 3 meals daily  -eating protein first, getting >60gm protein daily  -eating slowly, chewing food well  -avoiding/limiting calorie containing beverages  -limiting starchy vegetables and carbohydrates, choosing wheat, not white with breads,   crackers, pastas, barbara, bagels, tortillas, rice  -limiting restaurant or cafeteria eating to twice a week or less    We discussed the importance of restorative sleep and stress management in maintaining a healthy weight.  We discussed the National Weight Control Registry healthy weight maintenance strategies and ways to optimize metabolism.  We discussed the importance of physical activity including cardiovascular and strength training in maintaining a healthier weight.    Total time spent on the date of this encounter doing: chart review, review of test results, patient visit, physical exam, education, counseling, developing plan of care and documenting = 30 minutes.       Laila Powell is 32 year old  female who presents for a billable video visit today.    How would you like to obtain your AVS? MyChart  If dropped from the video visit, the video invitation should be resent by: Text to cell phone: [phone]  Will anyone " else be joining your video visit? No      Video Start Time:11:39 am    Are there any specific questions or needs that you would like addressed at your visit today? Questions about switching to Zarpo        Video-Visit Details    Type of service:  Video Visit    Platform used for Video Visit: [email or web address]    Video End Time (time video stopped): 11:53 AM    Originating Location (pt. Location): Home        Distant Location (provider location):  On-site    Distant Location (provider location):  [facility] YENIFER Grace MD  [facility]

## 2023-01-02 ENCOUNTER — VIRTUAL VISIT (OUTPATIENT)
Dept: SURGERY | Facility: CLINIC | Age: 33
End: 2023-01-02
Payer: COMMERCIAL

## 2023-01-02 VITALS — HEIGHT: 59 IN | BODY MASS INDEX: 27.21 KG/M2 | WEIGHT: 135 LBS

## 2023-01-02 DIAGNOSIS — E28.2 POLYCYSTIC OVARIES: ICD-10-CM

## 2023-01-02 DIAGNOSIS — E66.3 OVERWEIGHT (BMI 25.0-29.9): Primary | ICD-10-CM

## 2023-01-02 PROCEDURE — 99214 OFFICE O/P EST MOD 30 MIN: CPT | Mod: 95 | Performed by: FAMILY MEDICINE

## 2023-01-02 RX ORDER — MEDROXYPROGESTERONE ACETATE 10 MG
TABLET ORAL
COMMUNITY
Start: 2022-12-08 | End: 2024-01-29

## 2023-01-02 RX ORDER — FAMOTIDINE 20 MG/1
TABLET, FILM COATED ORAL
COMMUNITY
Start: 2022-11-09 | End: 2023-07-17

## 2023-01-02 RX ORDER — LIRAGLUTIDE 6 MG/ML
3 INJECTION, SOLUTION SUBCUTANEOUS DAILY
Qty: 15 ML | Refills: 3 | Status: SHIPPED | OUTPATIENT
Start: 2023-01-02 | End: 2023-03-20 | Stop reason: ALTCHOICE

## 2023-01-02 RX ORDER — TRETINOIN 0.25 MG/G
GEL TOPICAL
COMMUNITY
Start: 2022-12-23

## 2023-01-02 NOTE — LETTER
1/2/2023         RE: Laila Powell  [address]  [facility] [address]        Dear Colleague,    Thank you for referring your patient, Laila Powell, to the [facility] [place]. Please see a copy of my visit note below.    Bariatric Care Clinic Non Surgical Follow up Visit   Date of visit: 1/2/2023  Physician: JUDSON Grace MD, MD  Primary Care is Dee Winchester.  Laila Powell   32 year old  female     Initial weight 143#  Today's Weight:   Wt Readings from Last 1 Encounters:   01/02/23 61.2 kg (135 lb)     Body mass index is 27.27 kg/m .           Assessment and Plan   Assessment: Laila is a 32 year old year old female who presents for medical weight management.      Plan:    1. Overweight (BMI 25.0-29.9)  Patient was congratulated on her success thus far. Healthy habits to assist with further weight loss were discussed. She will work on getting adequate protein and vegetables. She will continue the saxenda. She would like to try switching to wegovy when it becomes available.     2. Polycystic ovaries  This may improve with healthy habits and weight loss.    Follow up in 3 months with myself           INTERIM HISTORY  Patient was taking saxenda but her insurance stopped covering it because she did not lose 7% of her body weight while taking it. She has now restarted saxenda at a new pharmacy. She is taking 3 mg per day. She thinks it is helping to control her appetite. She has been struggling with acne from it.     DIETARY HISTORY  Meals Per Day: 2  Eating Protein First?: usually  Food Diary: B:skipping, black coffee L:lean cuisine or smart ones frozen meal D:protein shake or soup or some protein source and vegetables  Snacks Per Day: 1-2  Typical Snack: mid moring, yogurt, sometimes fruit, granola bar occasionally  Fluid Intake: 64  Portion Control: yes  Calorie Containing Beverages: none  Typical Protein Food Choices: chicken, yogurt, shakeology protein  shakes  Choosing Whole Grains: yes- brown rice sometimes  Meals at Restaurant per week:0-1    Positive Changes Since Last Visit: smaller portions  Struggling With: sleep    Knowledgeable in Reading Food Labels: yes  Getting Adequate Protein: working on it  Sleeping 7-8 hours/day no, trying      PHYSICAL ACTIVITY PATTERNS:  Videos at home, cardio and strength 30-60 minutes 4-5 x per day    REVIEW OF SYSTEMS  GENERAL/CONSTITUTIONAL:  Fatigue: yes- but improved  HEENT:   glaucoma: no  PULMONARY:  Dyspnea on exertion: no  NEUROLOGIC:  Paresthesias: no  PSYCHIATRIC:  Moods: yes  ENDOCRINE:  Monitoring Blood Ygazl2ngy: na  Sugars Well Controlled: na  No personal or family history of medullary thyroid cancer no  :  Birth control: none- provera to induce period only       Patient Profile   Social History     Social History Narrative    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         Past Medical History   Past Medical History:   Diagnosis Date     Acute cystitis      Anemia 11/4/2013     Anemia      Candidiasis of vulva and vagina      Disease of thyroid gland      Dizziness      Headache      Hypothyroidism 11/4/2013     Hypothyroidism      Microscopic hematuria      Pharyngitis      Reflux gastritis      UTI (urinary tract infection)      Patient Active Problem List   Diagnosis     Health Care Home     Microscopic hematuria     Hypothyroidism     Gastroesophageal reflux disease, esophagitis presence not specified     Vitamin D deficiency     Class 1 obesity due to excess calories without serious comorbidity with body mass index (BMI) of 31.0 to 31.9 in adult     Polycystic ovaries     Female infertility associated with anovulation     Autoimmune thyroiditis     Missed period       Past Surgical History  She has a past surgical history that includes Cholecystectomy (2013); ------------other------------- (10/06/2020); IR Abscess Drain Insertion (3/1/2021); IR  "Abscess Tube Change (3/9/2021); IR Abscess Drain Insertion (3/31/2021); Cholecystectomy; c/section, low transverse; Combined Augmentation Mammaplasty And Abdominoplasty; other surgical history; Ir Abscess Drain Insertion (3/1/2021); IR Abscess Drain Exchange (3/9/2021); and Ir Abscess Drain Insertion (3/31/2021).     Examination   Ht 1.499 m (4' 11\")   Wt 61.2 kg (135 lb)   BMI 27.27 kg/m    GENERAL: Healthy, alert and no distress  EYES: Eyes grossly normal to inspection.  No discharge or erythema, or obvious scleral/conjunctival abnormalities.  RESP: No audible wheeze, cough, or visible cyanosis.  No visible retractions or increased work of breathing.    SKIN: Visible skin clear. No significant rash, abnormal pigmentation or lesions.  NEURO: Cranial nerves grossly intact.  Mentation and speech appropriate for age.  PSYCH: Mentation appears normal, affect normal/bright, judgement and insight intact, normal speech and appearance well-groomed.     Counseling:   We reviewed the important post op bariatric recommendations:  -eating 3 meals daily  -eating protein first, getting >60gm protein daily  -eating slowly, chewing food well  -avoiding/limiting calorie containing beverages  -limiting starchy vegetables and carbohydrates, choosing wheat, not white with breads,   crackers, pastas, barbara, bagels, tortillas, rice  -limiting restaurant or cafeteria eating to twice a week or less    We discussed the importance of restorative sleep and stress management in maintaining a healthy weight.  We discussed the National Weight Control Registry healthy weight maintenance strategies and ways to optimize metabolism.  We discussed the importance of physical activity including cardiovascular and strength training in maintaining a healthier weight.    Total time spent on the date of this encounter doing: chart review, review of test results, patient visit, physical exam, education, counseling, developing plan of care and documenting " = 30 minutes.       Laila Powell is 32 year old  female who presents for a billable video visit today.    How would you like to obtain your AVS? MyChart  If dropped from the video visit, the video invitation should be resent by: Text to cell phone: [phone]  Will anyone else be joining your video visit? No      Video Start Time:11:39 am    Are there any specific questions or needs that you would like addressed at your visit today? Questions about switching to [employer]pex        Video-Visit Details    Type of service:  Video Visit    Platform used for Video Visit: Fastgen    Video End Time (time video stopped): 11:53 AM    Originating Location (pt. Location): Home        Distant Location (provider location):  On-site    Distant Location (provider location):  [facility] [place]         JUDSON Grace MD  [facility]             Again, thank you for allowing me to participate in the care of your patient.        Sincerely,        JUDSON Grace MD

## 2023-02-28 ENCOUNTER — TELEPHONE (OUTPATIENT)
Dept: SURGERY | Facility: CLINIC | Age: 33
End: 2023-02-28

## 2023-02-28 ENCOUNTER — VIRTUAL VISIT (OUTPATIENT)
Dept: SURGERY | Facility: CLINIC | Age: 33
End: 2023-02-28
Payer: COMMERCIAL

## 2023-02-28 DIAGNOSIS — E66.3 OVERWEIGHT (BMI 25.0-29.9): Primary | ICD-10-CM

## 2023-02-28 PROCEDURE — 97802 MEDICAL NUTRITION INDIV IN: CPT | Mod: VID

## 2023-02-28 NOTE — PATIENT INSTRUCTIONS
"Initial Consult / Weight Loss    My Plate   [email or web address]    Protein Sources for Weight Loss  [email or web address]     High protein breakfast ideas:  -[place] products (high protein brand) - oatmeal, muffins, pancakes, etc.  -Overnight oats - there are easy \"just add water\" kinds in the grocery store or lots of recipes out there, look for one that has added protein from liquid or powder or other source  -Breakfast Egg Casseroles  -Scrambled eggs with cottage cheese whipped in  -English muffin breakfast sandwiches or burritos  -Frozen breakfast bowls OR \"Add an Egg\" bowls  -Protein bars - 10/10 rule is a good rule of thumb (Brands: [place] Protein Bar, RX bar, Skout Protein Bars)  -High protein tortillas or low carb tortilla for breakfast burrito  -Turkey, Veggie, Chicken, Black bean burgers in the frozen section - add cheese and fried egg on top  -Greek Yogurt (examples: plain greek, Oikos Triple Zero, Dannon Light N Fit, Two Good Yogurt), with choice of fresh or frozen fruit, vianca seeds, hemp seeds, nuts  -1 slice whole wheat bread with mini avocado or half regular sized avocado, \"Everything But the Bagel\" seasoning, [place] lemon on the side or on top  -2-3 light string cheeses with fruit (banana, fruit cup, berries, etc.)  -Cottage cheese and fruit on top  -Breakfast Skillet: sauteed bell peppers, onions with eggs and sprinkle of cheese (tip: batch prep sauteed peppers and onions for the week for a faster breakfast)      Quick and Easy Meals for Rotational Menus    Salad kit with rotisserie chicken, eggs, lunch meat, beans or tuna    Lunch meat sandwich or wrap with veggies (sugar snap peas, bell pepper slices, carrot sticks, celery, sliced cucumber, cherry tomatoes, etc.)    1/2 - 3/4 cup of Greek or light yogurt with a 1/4 - 1/2 cup of nuts and fruit    Cottage cheese, cherry tomatoes and Triscuit crackers    Refried bean and cheese quesadilla on whole wheat tortilla    Can of " Progresso Light or [place] Well Yes soup - add additional leftover protein like chicken to boost protein    [place] cakes pancake or waffles with peanut butter or berries    Baked sweet potato with black beans and salsa and a side salad    Adult lunchable: lunch meat, string cheese, crackers and veggies    Tuna Cucumber Boats: cut cucumber in half, scoop out cucumber seeds, fill with mixture of tuna, light manzo, gorge mustard, red onion, banana peppers, dried dill, salt and pepper    Protein pasta salad: whole wheat or bean pasta with chicken sausage, broccoli and italian dressing    Egg sandwich on english muffin: egg, slice of cheese and piece of ham    Grilled cheese and turkey sandwich with a side salad or cup of soup    BBQ Flatbread: Flat Out high protein flatbread with rotisserie chicken, BBQ sauce and red onion, topped with mozzarella cheese and baked in the oven    [place] Chicken Wrap: Rotisserie chicken warmed up with Erik's Hot Sauce in a medium size tortilla with light ranch dressing. Add mixed greens, red onion, diced tomato, 2% shredded cheddar cheese.

## 2023-02-28 NOTE — LETTER
2/28/2023         RE: Laila Powell  [address]  [facility] [address]        Dear Colleague,    Thank you for referring your patient, Laila Powell, to the [facility] [place]. Please see a copy of my visit note below.    Laila Powell is a 32 year old who is being evaluated via a billable video visit.      How would you like to obtain your AVS? MyChart  If the video visit is dropped, the invitation should be resent by: Text to cell phone: [phone]  Will anyone else be joining your video visit? No          Medical Weight Loss Initial Diet Evaluation  Assessment:  Laila is presenting today for a new weight management nutrition consultation. Pt has had an initial appointment with Dr. Grace.  Weight loss medication: Saxenda. - going well.     Personal Goals: Wants to maintain her weight loss and learn more mindful eating habits      Anthropometrics:    Pt's weight is 137 lbs  Initial weight: 143 lbs  Weight change: 6 lbs down  BMI: There is no height or weight on file to calculate BMI.   Patient must be at least 60 in tall to calculate ideal body weight  Estimated RMR ([place]-St Jeor equation):  1,220 kcals x 1.2 (sedentary) = 1,470 kcals (for weight maintenance)      Recommended Protein Intake: 50-60 grams of protein/day    Medical History:  Patient Active Problem List   Diagnosis     Health Care Home     Microscopic hematuria     Hypothyroidism     Gastroesophageal reflux disease, esophagitis presence not specified     Vitamin D deficiency     Class 1 obesity due to excess calories without serious comorbidity with body mass index (BMI) of 31.0 to 31.9 in adult     Polycystic ovaries     Female infertility associated with anovulation     Autoimmune thyroiditis     Missed period      Diabetes: No  HbA1c:  No results found for: HGBA1C    Nutrition History:   Food allergies/intolerances/cultural or religous food customs: No     Weight loss history: Has started more  minfuling eating. Hx of using phentermine that worked well but didn't like the stimulant side effects and headaches. Feels like the current medication is plateauing as she notices she is getting hungrier throughout the day and has had some weight re gain. Having to cook or prepare foods for her kid can make it difficult to eat healthier.    Vitamins/Mineral Supplementation: MVI, Hair and nail vit, Vit C occasionally     Dietary Recall:    Wakes up at 6-7 am    Breakfast 6am: Skips or protein shake (Atkins)  or bowl of cereal or toast   Lunch 12pm: Microwave meal (Lean cuisine) Or chicken with vegetables Or easy prep meals  Dinner: boiled chicken with tofu or microwave meal.   Typical Snacks: Fruit or granola car, nuts     Eating out: <1x per week     Beverages: Water, coffee occasionally,      Exercise: tried to do 1 hour workout from you tube at least 3x per week      Nutrition Diagnosis (PES statement):   Overweight/Obesity (NC 3.3) related to overeating and poor lifestyle habits as evidenced by patient report of skipping meals, not meeting protein needs and BMI 27.3 kg/m2      Nutrition Intervention  1. Food and/or Nutrient Delivery   a. Placed emphasis on importance of developing a healthy meal routine, aiming for 3 meals a day and no snacks.  b. Discussed using a protein supplement as a meal replacement.  2. Nutrition Education   a. Discussed with patient how to build a meal: the importance of including a lean/low fat protein at each meal, include a source of vegetables at a minimum of lunch and dinner and limiting carbohydrate intake  b. Educated on sources of lean protein, portion sizes, the amount of grams found in each source. Recommend patient to aim for 15-20g protein at each meal.  c. Educated on how to read a food label: keeping total fat <10g and sugar <10g per serving.        Goals established by patient:   1. Start to having breakfast daily  2. Aim for 15-20g PRO at each meal  3. Be mindful of the  10/10 rule.    Handouts provided:  Protein sources  Breakfast options   Quick meals     Assessment/Plan:    Pt will follow up in 1.5 month(s) with bariatrician and 2 month(s) with dietitian.         Video-Visit Details    Type of service:  Video Visit    Video Start Time (time video started): 2:54 pm     Video End Time (time video stopped): 3:17 pm    Originating Location (pt. Location): Home        Distant Location (provider location):  Off-site    Mode of Communication:  Video Conference via [facility]    Physician has received verbal consent for a Video Visit from the patient? Yes      Olga Thomson RD          Again, thank you for allowing me to participate in the care of your patient.        Sincerely,        Olga Thomson RD

## 2023-02-28 NOTE — PROGRESS NOTES
Laila Powell is a 32 year old who is being evaluated via a billable video visit.      How would you like to obtain your AVS? MyChart  If the video visit is dropped, the invitation should be resent by: Text to cell phone: [phone]  Will anyone else be joining your video visit? No          Medical Weight Loss Initial Diet Evaluation  Assessment:  Laila is presenting today for a new weight management nutrition consultation. Pt has had an initial appointment with Dr. Grace.  Weight loss medication: Saxenda. - going well.     Personal Goals: Wants to maintain her weight loss and learn more mindful eating habits      Anthropometrics:    Pt's weight is 137 lbs  Initial weight: 143 lbs  Weight change: 6 lbs down  BMI: There is no height or weight on file to calculate BMI.   Patient must be at least 60 in tall to calculate ideal body weight  Estimated RMR ([place]-St Jeor equation):  1,220 kcals x 1.2 (sedentary) = 1,470 kcals (for weight maintenance)      Recommended Protein Intake: 50-60 grams of protein/day    Medical History:  Patient Active Problem List   Diagnosis     Health Care Home     Microscopic hematuria     Hypothyroidism     Gastroesophageal reflux disease, esophagitis presence not specified     Vitamin D deficiency     Class 1 obesity due to excess calories without serious comorbidity with body mass index (BMI) of 31.0 to 31.9 in adult     Polycystic ovaries     Female infertility associated with anovulation     Autoimmune thyroiditis     Missed period      Diabetes: No  HbA1c:  No results found for: HGBA1C    Nutrition History:   Food allergies/intolerances/cultural or religous food customs: No     Weight loss history: Has started more minfuling eating. Hx of using phentermine that worked well but didn't like the stimulant side effects and headaches. Feels like the current medication is plateauing as she notices she is getting hungrier throughout the day and has had some weight re gain. Having to cook or  prepare foods for her kid can make it difficult to eat healthier.    Vitamins/Mineral Supplementation: MVI, Hair and nail vit, Vit C occasionally     Dietary Recall:    Wakes up at 6-7 am    Breakfast 6am: Skips or protein shake (Atkins)  or bowl of cereal or toast   Lunch 12pm: Microwave meal (Lean cuisine) Or chicken with vegetables Or easy prep meals  Dinner: boiled chicken with tofu or microwave meal.   Typical Snacks: Fruit or granola car, nuts     Eating out: <1x per week     Beverages: Water, coffee occasionally,      Exercise: tried to do 1 hour workout from you tube at least 3x per week      Nutrition Diagnosis (PES statement):   Overweight/Obesity (NC 3.3) related to overeating and poor lifestyle habits as evidenced by patient report of skipping meals, not meeting protein needs and BMI 27.3 kg/m2      Nutrition Intervention  1. Food and/or Nutrient Delivery   a. Placed emphasis on importance of developing a healthy meal routine, aiming for 3 meals a day and no snacks.  b. Discussed using a protein supplement as a meal replacement.  2. Nutrition Education   a. Discussed with patient how to build a meal: the importance of including a lean/low fat protein at each meal, include a source of vegetables at a minimum of lunch and dinner and limiting carbohydrate intake  b. Educated on sources of lean protein, portion sizes, the amount of grams found in each source. Recommend patient to aim for 15-20g protein at each meal.  c. Educated on how to read a food label: keeping total fat <10g and sugar <10g per serving.        Goals established by patient:   1. Start to having breakfast daily  2. Aim for 15-20g PRO at each meal  3. Be mindful of the 10/10 rule.    Handouts provided:  Protein sources  Breakfast options   Quick meals     Assessment/Plan:    Pt will follow up in 1.5 month(s) with bariatrician and 2 month(s) with dietitian.         Video-Visit Details    Type of service:  Video Visit    Video Start Time  (time video started): 2:54 pm     Video End Time (time video stopped): 3:17 pm    Originating Location (pt. Location): Home        Distant Location (provider location):  Off-site    Mode of Communication:  Video Conference via [facility]    Physician has received verbal consent for a Video Visit from the patient? Yes      Olga Thomson RD

## 2023-03-20 DIAGNOSIS — E66.811 CLASS 1 OBESITY DUE TO EXCESS CALORIES WITHOUT SERIOUS COMORBIDITY WITH BODY MASS INDEX (BMI) OF 31.0 TO 31.9 IN ADULT: Primary | ICD-10-CM

## 2023-03-20 DIAGNOSIS — E66.09 CLASS 1 OBESITY DUE TO EXCESS CALORIES WITHOUT SERIOUS COMORBIDITY WITH BODY MASS INDEX (BMI) OF 31.0 TO 31.9 IN ADULT: Primary | ICD-10-CM

## 2023-03-20 RX ORDER — SEMAGLUTIDE 0.5 MG/.5ML
0.5 INJECTION, SOLUTION SUBCUTANEOUS WEEKLY
Qty: 2 ML | Refills: 0 | Status: SHIPPED | OUTPATIENT
Start: 2023-03-20 | End: 2023-04-17

## 2023-03-20 RX ORDER — SEMAGLUTIDE 1 MG/.5ML
1 INJECTION, SOLUTION SUBCUTANEOUS WEEKLY
Qty: 2 ML | Refills: 0 | Status: SHIPPED | OUTPATIENT
Start: 2023-03-20 | End: 2023-10-18 | Stop reason: DRUGHIGH

## 2023-03-21 ENCOUNTER — TELEPHONE (OUTPATIENT)
Dept: SURGERY | Facility: CLINIC | Age: 33
End: 2023-03-21
Payer: COMMERCIAL

## 2023-03-21 NOTE — TELEPHONE ENCOUNTER
Prior Authorization Retail Medication Request    Medication/Dose: Wegovy 1.7MG/0.75ML Auto injectors    Insurance Name:  Blue Plus  Insurance ID:    [id number]    Pharmacy Information (if different than what is on RX)    [id number] - [place] - [address] AT [facility] [address]

## 2023-03-23 NOTE — TELEPHONE ENCOUNTER
Central Prior Authorization Team   Phone: [phone]      PA Initiation    Medication: Semaglutide-Weight Management (WEGOVY) 1.7 MG/0.75ML pen - PA INITIATED  Insurance Company: [employer] [place] - Phone [phone] Fax [phone]  Pharmacy Filling the Rx: [facility] #[id number] [place], [place] - [address] [facility] [address]  Filling Pharmacy Phone: [phone]  Filling Pharmacy Fax:    Start Date: 3/23/2023

## 2023-03-24 NOTE — TELEPHONE ENCOUNTER
Prior Authorization Approval    Authorization Effective Date: 1/1/2023  Authorization Expiration Date: 3/21/2024  Medication: Semaglutide-Weight Management (WEGOVY) 1.7 MG/0.75ML pen - [place] APPROVED  Insurance Company: [employer] [place] - Phone [phone] Fax [phone]  Which Pharmacy is filling the prescription (Not needed for infusion/clinic administered): [id number] [address] [facility] [address]  Pharmacy Notified: Yes  Patient Notified: Yes (pharmacy will notify patient when ready)

## 2023-04-08 ENCOUNTER — HEALTH MAINTENANCE LETTER (OUTPATIENT)
Age: 33
End: 2023-04-08

## 2023-04-10 ENCOUNTER — VIRTUAL VISIT (OUTPATIENT)
Dept: SURGERY | Facility: CLINIC | Age: 33
End: 2023-04-10
Payer: COMMERCIAL

## 2023-04-10 VITALS — BODY MASS INDEX: 27.62 KG/M2 | HEIGHT: 59 IN | WEIGHT: 137 LBS

## 2023-04-10 DIAGNOSIS — E66.3 OVERWEIGHT (BMI 25.0-29.9): Primary | ICD-10-CM

## 2023-04-10 DIAGNOSIS — E28.2 POLYCYSTIC OVARIES: ICD-10-CM

## 2023-04-10 PROCEDURE — 99214 OFFICE O/P EST MOD 30 MIN: CPT | Mod: VID | Performed by: FAMILY MEDICINE

## 2023-04-10 RX ORDER — BENZOYL PEROXIDE 50 MG/ML
LIQUID TOPICAL
COMMUNITY
Start: 2023-04-09

## 2023-04-10 NOTE — PROGRESS NOTES
[facility] Non Surgical Follow up Visit   Date of visit: 4/10/2023  Physician: JUDSON Grace MD, MD  Primary Care is Lyndon Velez.  Laila Powell   32 year old  female     Initial Weight: 143#  Today's Weight:   Wt Readings from Last 1 Encounters:   04/10/23 62.1 kg (137 lb)     Body mass index is 27.67 kg/m .           Assessment and Plan   Assessment: Laila is a 32 year old year old female who presents for medical weight management.      Plan:    1. Overweight (BMI 25.0-29.9)  Patient was congratulated on her success thus far. Healthy habits to assist with further weight loss were discussed. She is hoping to get more exercise now that the weather has improved. She will continue the wegovy and will slowly taper the dose up.  2. Polycystic ovaries  This may improve with healthy habits and weight loss.    Follow up in 3 months with myself and in 1 month with our dietician           INTERIM HISTORY  Patient switchd from saxenda to wegovy a few weeks ago. She finds that it helps to control her appetite. She denies side effects. She was having breakouts on her face like with the saxenda    Goals established by patient with dietician 2/28/23:   1. Start to having breakfast daily  2. Aim for 15-20g PRO at each meal  3. Be mindful of the 10/10 rule.    DIETARY HISTORY  Meals Per Day: 3  Eating Protein First?: working on it  Food Diary: B:boiled eggs L:lean cuisine, (20 gm of protein) D:chicken breast, vegetables  Snacks Per Day: 1-2  Typical Snack: granola bar  Fluid Intake: 64 oz  Portion Control: yes  Calorie Containing Beverages: none  Typical Protein Food Choices: working on it  Choosing Whole Grains: not eating much grain  Meals at Restaurant per week:1-3    Positive Changes Since Last Visit: portion control  Struggling With: sleep,     Knowledgeable in Reading Food Labels: yes  Getting Adequate Protein: yes  Sleeping 7-8 hours/day sometimes  Stress management exercise,    PHYSICAL ACTIVITY PATTERNS:  Videos,  3-4 x per week (90 minutes)    REVIEW OF SYSTEMS  GENERAL/CONSTITUTIONAL:  Fatigue: sometimes  HEENT:   glaucoma: no  CARDIOVASCULAR:  History of heart disease: no  PULMONARY:  Dyspnea on exertion: sometimes  NEUROLOGIC:  Paresthesias: no  PSYCHIATRIC:  Moods: stable  ENDOCRINE:  Monitoring Blood Sugars: no  Sugars Well Controlled: na  No personal or family history of medullary thyroid cancer no  :  Birth control: not discussed       Patient Profile   Social History     Social History Narrative    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         Past Medical History   Past Medical History:   Diagnosis Date     Acute cystitis      Anemia 11/4/2013     Anemia      Candidiasis of vulva and vagina      Disease of thyroid gland      Dizziness      Headache      Hypothyroidism 11/4/2013     Hypothyroidism      Microscopic hematuria      Pharyngitis      Reflux gastritis      UTI (urinary tract infection)      Patient Active Problem List   Diagnosis     Health Care Home     Microscopic hematuria     Hypothyroidism     Gastroesophageal reflux disease, esophagitis presence not specified     Vitamin D deficiency     Class 1 obesity due to excess calories without serious comorbidity with body mass index (BMI) of 31.0 to 31.9 in adult     Polycystic ovaries     Female infertility associated with anovulation     Autoimmune thyroiditis     Missed period       Past Surgical History  She has a past surgical history that includes Cholecystectomy (2013); ------------other------------- (10/06/2020); IR Abscess Drain Insertion (3/1/2021); IR Abscess Tube Change (3/9/2021); IR Abscess Drain Insertion (3/31/2021); Cholecystectomy; c/section, low transverse; Combined Augmentation Mammaplasty And Abdominoplasty; other surgical history; Ir Abscess Drain Insertion (3/1/2021); IR Abscess Drain Exchange (3/9/2021); and Ir Abscess Drain Insertion (3/31/2021).     Examination   Ht  "1.499 m (4' 11\")   Wt 62.1 kg (137 lb)   BMI 27.67 kg/m    Wt Readings from Last 4 Encounters:   04/10/23 62.1 kg (137 lb)   01/02/23 61.2 kg (135 lb)   10/03/22 63.5 kg (140 lb)   05/10/22 64.9 kg (143 lb)     GENERAL: Healthy, alert and no distress  EYES: Eyes grossly normal to inspection.  No discharge or erythema, or obvious scleral/conjunctival abnormalities.  RESP: No audible wheeze, cough, or visible cyanosis.  No visible retractions or increased work of breathing.    SKIN: Visible skin clear. No significant rash, abnormal pigmentation or lesions.  NEURO: Cranial nerves grossly intact.  Mentation and speech appropriate for age.  PSYCH: Mentation appears normal, affect normal/bright, judgement and insight intact, normal speech and appearance well-groomed.       Counseling:   We reviewed the important post op bariatric recommendations:  -eating 3 meals daily  -eating protein first, getting >60gm protein daily  -eating slowly, chewing food well  -avoiding/limiting calorie containing beverages  -limiting starchy vegetables and carbohydrates, choosing wheat, not white with breads,   crackers, pastas, barbara, bagels, tortillas, rice  -limiting restaurant or cafeteria eating to twice a week or less    We discussed the importance of restorative sleep and stress management in maintaining a healthy weight.  We discussed the National Weight Control Registry healthy weight maintenance strategies and ways to optimize metabolism.  We discussed the importance of physical activity including cardiovascular and strength training in maintaining a healthier weight.    Total time spent on the date of this encounter doing: chart review, review of test results, patient visit, physical exam, education, counseling, developing plan of care and documenting = 30 minutes.     Laila Powell is 32 year old  female who presents for a billable video visit today.    How would you like to obtain your AVS? MyChart  If dropped from the video " visit, the video invitation should be resent by: Text to cell phone: [phone]  Will anyone else be joining your video visit? No      Video Start Time: 11:45 AM    Are there any specific questions or needs that you would like addressed at your visit today? No concerns or complaints          Video-Visit Details    Type of service:  Video Visit    Platform used for Video Visit: FullStory    Video End Time (time video stopped): 12:00 PM    Originating Location (pt. Location): Home        Distant Location (provider location):  Off-site    Distant Location (provider location):  [facility] YENIFER Grace MD  [facility]

## 2023-04-10 NOTE — LETTER
4/10/2023         RE: Laila Powell  [address]  [facility] [place] [facility] [address]        Dear Colleague,    Thank you for referring your patient, Laila Powell, to the [facility] [place]. Please see a copy of my visit note below.    Bariatric Care Clinic Non Surgical Follow up Visit   Date of visit: 4/10/2023  Physician: JUDSON Grace MD, MD  Primary Care is Lyndon Velez.  Laila Powell   32 year old  female     Initial Weight: 143#  Today's Weight:   Wt Readings from Last 1 Encounters:   04/10/23 62.1 kg (137 lb)     Body mass index is 27.67 kg/m .           Assessment and Plan   Assessment: Laila is a 32 year old year old female who presents for medical weight management.      Plan:    1. Overweight (BMI 25.0-29.9)  Patient was congratulated on her success thus far. Healthy habits to assist with further weight loss were discussed. She is hoping to get more exercise now that the weather has improved. She will continue the wegovy and will slowly taper the dose up.  2. Polycystic ovaries  This may improve with healthy habits and weight loss.    Follow up in 3 months with myself and in 1 month with our dietician           INTERIM HISTORY  Patient switchd from saxenda to wegovy a few weeks ago. She finds that it helps to control her appetite. She denies side effects. She was having breakouts on her face like with the saxenda    Goals established by patient with dietician 2/28/23:   1. Start to having breakfast daily  2. Aim for 15-20g PRO at each meal  3. Be mindful of the 10/10 rule.    DIETARY HISTORY  Meals Per Day: 3  Eating Protein First?: working on it  Food Diary: B:boiled eggs L:lean cuisine, (20 gm of protein) D:chicken breast, vegetables  Snacks Per Day: 1-2  Typical Snack: granola bar  Fluid Intake: 64 oz  Portion Control: yes  Calorie Containing Beverages: none  Typical Protein Food Choices: working on it  Choosing Whole Grains: not eating much grain  Meals at Restaurant  per week:1-3    Positive Changes Since Last Visit: portion control  Struggling With: sleep,     Knowledgeable in Reading Food Labels: yes  Getting Adequate Protein: yes  Sleeping 7-8 hours/day sometimes  Stress management exercise,    PHYSICAL ACTIVITY PATTERNS:  Videos, 3-4 x per week (90 minutes)    REVIEW OF SYSTEMS  GENERAL/CONSTITUTIONAL:  Fatigue: sometimes  HEENT:   glaucoma: no  CARDIOVASCULAR:  History of heart disease: no  PULMONARY:  Dyspnea on exertion: sometimes  NEUROLOGIC:  Paresthesias: no  PSYCHIATRIC:  Moods: stable  ENDOCRINE:  Monitoring Blood Sugars: no  Sugars Well Controlled: na  No personal or family history of medullary thyroid cancer no  :  Birth control: not discussed       Patient Profile   Social History     Social History Narrative    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         Past Medical History   Past Medical History:   Diagnosis Date     Acute cystitis      Anemia 11/4/2013     Anemia      Candidiasis of vulva and vagina      Disease of thyroid gland      Dizziness      Headache      Hypothyroidism 11/4/2013     Hypothyroidism      Microscopic hematuria      Pharyngitis      Reflux gastritis      UTI (urinary tract infection)      Patient Active Problem List   Diagnosis     Health Care Home     Microscopic hematuria     Hypothyroidism     Gastroesophageal reflux disease, esophagitis presence not specified     Vitamin D deficiency     Class 1 obesity due to excess calories without serious comorbidity with body mass index (BMI) of 31.0 to 31.9 in adult     Polycystic ovaries     Female infertility associated with anovulation     Autoimmune thyroiditis     Missed period       Past Surgical History  She has a past surgical history that includes Cholecystectomy (2013); ------------other------------- (10/06/2020); IR Abscess Drain Insertion (3/1/2021); IR Abscess Tube Change (3/9/2021); IR Abscess Drain Insertion  "(3/31/2021); Cholecystectomy; c/section, low transverse; Combined Augmentation Mammaplasty And Abdominoplasty; other surgical history; Ir Abscess Drain Insertion (3/1/2021); IR Abscess Drain Exchange (3/9/2021); and Ir Abscess Drain Insertion (3/31/2021).     Examination   Ht 1.499 m (4' 11\")   Wt 62.1 kg (137 lb)   BMI 27.67 kg/m    Wt Readings from Last 4 Encounters:   04/10/23 62.1 kg (137 lb)   01/02/23 61.2 kg (135 lb)   10/03/22 63.5 kg (140 lb)   05/10/22 64.9 kg (143 lb)     GENERAL: Healthy, alert and no distress  EYES: Eyes grossly normal to inspection.  No discharge or erythema, or obvious scleral/conjunctival abnormalities.  RESP: No audible wheeze, cough, or visible cyanosis.  No visible retractions or increased work of breathing.    SKIN: Visible skin clear. No significant rash, abnormal pigmentation or lesions.  NEURO: Cranial nerves grossly intact.  Mentation and speech appropriate for age.  PSYCH: Mentation appears normal, affect normal/bright, judgement and insight intact, normal speech and appearance well-groomed.       Counseling:   We reviewed the important post op bariatric recommendations:  -eating 3 meals daily  -eating protein first, getting >60gm protein daily  -eating slowly, chewing food well  -avoiding/limiting calorie containing beverages  -limiting starchy vegetables and carbohydrates, choosing wheat, not white with breads,   crackers, pastas, barbara, bagels, tortillas, rice  -limiting restaurant or cafeteria eating to twice a week or less    We discussed the importance of restorative sleep and stress management in maintaining a healthy weight.  We discussed the National Weight Control Registry healthy weight maintenance strategies and ways to optimize metabolism.  We discussed the importance of physical activity including cardiovascular and strength training in maintaining a healthier weight.    Total time spent on the date of this encounter doing: chart review, review of test " results, patient visit, physical exam, education, counseling, developing plan of care and documenting = 30 minutes.     Laila Powell is 32 year old  female who presents for a billable video visit today.    How would you like to obtain your AVS? MyChart  If dropped from the video visit, the video invitation should be resent by: Text to cell phone: [phone]  Will anyone else be joining your video visit? No      Video Start Time: 11:45 AM    Are there any specific questions or needs that you would like addressed at your visit today? No concerns or complaints          Video-Visit Details    Type of service:  Video Visit    Platform used for Video Visit: Fobbler    Video End Time (time video stopped): 12:00 PM    Originating Location (pt. Location): Home        Distant Location (provider location):  Off-site    Distant Location (provider location):  [facility]GERY CL[facility] AND [facility]IATRICS CARE [place]       JUDSON Grace MD  [facility]             Again, thank you for allowing me to participate in the care of your patient.        Sincerely,        JUDSON Grace MD

## 2023-04-26 ENCOUNTER — VIRTUAL VISIT (OUTPATIENT)
Dept: SURGERY | Facility: CLINIC | Age: 33
End: 2023-04-26
Payer: COMMERCIAL

## 2023-04-26 DIAGNOSIS — E66.3 OVERWEIGHT (BMI 25.0-29.9): Primary | ICD-10-CM

## 2023-04-26 DIAGNOSIS — Z71.3 NUTRITIONAL COUNSELING: ICD-10-CM

## 2023-04-26 PROCEDURE — 97803 MED NUTRITION INDIV SUBSEQ: CPT | Mod: VID

## 2023-04-26 NOTE — PROGRESS NOTES
Laila Powell is a 32 year old who is being evaluated via a billable video visit.      How would you like to obtain your AVS? MyChart  If the video visit is dropped, the invitation should be resent by: Text to cell phone: [phone]  Will anyone else be joining your video visit? No    Medical  Weight Loss Follow-Up Diet Evaluation  Assessment:  Laila is presenting today for a follow up weight management nutrition consultation.  This patient has had an initial appointment and was referred by Dr. Grace for MNT as treatment for weight management.  Weight loss medication: Semaglutide. - Weogvy  Pt's weight is 135.8 lbs  Initial weight: 143 lbs  Weight change: 7.2 lbs down         View : No data to display.              BMI: 27.4 kg/m2    Patient must be at least 60 in tall to calculate ideal body weight    Estimated RMR ([place]-St Jeor equation):   1,220 kcals x 1.2 (sedentary) = 1,470 kcals (for weight maintenance)  Recommended Protein Intake: 50-60 grams of protein/day  Patient Active Problem List:  Patient Active Problem List   Diagnosis     Health Care Home     Microscopic hematuria     Hypothyroidism     Gastroesophageal reflux disease, esophagitis presence not specified     Vitamin D deficiency     Class 1 obesity due to excess calories without serious comorbidity with body mass index (BMI) of 31.0 to 31.9 in adult     Polycystic ovaries     Female infertility associated with anovulation     Autoimmune thyroiditis     Missed period     Diabetes: No    Progress on goals from last visit: Patient reports she is starting to eat earlier in the morning. Recently started Wegvoy. Is going well. Has noticed her appetite is smaller. Still has hunger hormones (good). Personal goal to get down 120-125 lbs.     1. Start to having breakfast daily - met   2. Aim for 15-20g PRO at each meal - met   3. Be mindful of the 10/10 rule. - met    Dietary Recall:  Breakfast: boiled eggs x2 or toast with peanut butter  Lunch: leftovers  "or Microwave meal (Lean cuisine)  Dinner:boiled/steamed cabbage or chicken breast   Typical snacks: Fruit or granola car, nuts   Eating out: <1x per week   Beverages: Water (3-4 bottles of water daily)  Exercise:  1 hour workout from you tube at least 3x per week      Nutrition Diagnosis:    Overweight related to overeating and poor lifestyle habits as evidenced by patient report of skipping meals, not meeting protein needs and BMI 27.4 kg/m2      Intervention:  1. Food and/or nutrient delivery:   a. Discussed keeping diet \"colorful\" - aim to incorporate a variety of colored fruits and vegetables in diet  2. Nutrition education:  a. Discussed healthy and ststainable weight goals  3. Nutrition counseling:   a. Goal setting  b. Continued support for weight loss    Monitoring/Evaluation:    Goals:  1. Aim for 5-8k steps per day (when not working)  2. Continue with current goals set in place  3. Keep your diet \"colorful\"    Patient to follow up in 3 month(s) with bariatrician and 3 month(s) with RD      Video-Visit Details    Type of service:  Video Visit    Video Start Time (time video started): 10:55 am    Video End Time (time video stopped): 11:21 am    Originating Location (pt. Location): Home        Distant Location (provider location):  Off-site    Mode of Communication:  Video Conference via [facility]    Physician has received verbal consent for a Video Visit from the patient? Yes      Olga Thomson RD        "

## 2023-04-26 NOTE — LETTER
4/26/2023         RE: Laila Powell  [address]  [place] [facility] [address]        Dear Colleague,    Thank you for referring your patient, Laila Powell, to the [facility] CL[facility]ATR[facility] C[facility] [place]. Please see a copy of my visit note below.    Laila Powell is a 32 year old who is being evaluated via a billable video visit.      How would you like to obtain your AVS? MyChart  If the video visit is dropped, the invitation should be resent by: Text to cell phone: [phone]  Will anyone else be joining your video visit? No    Medical  Weight Loss Follow-Up Diet Evaluation  Assessment:  Laila is presenting today for a follow up weight management nutrition consultation.  This patient has had an initial appointment and was referred by Dr. Grace for MNT as treatment for weight management.  Weight loss medication: Semaglutide. - Weogvy  Pt's weight is 135.8 lbs  Initial weight: 143 lbs  Weight change: 7.2 lbs down         View : No data to display.              BMI: 27.4 kg/m2    Patient must be at least 60 in tall to calculate ideal body weight    Estimated RMR ([place]-St Jeor equation):   1,220 kcals x 1.2 (sedentary) = 1,470 kcals (for weight maintenance)  Recommended Protein Intake: 50-60 grams of protein/day  Patient Active Problem List:  Patient Active Problem List   Diagnosis     Health Care Home     Microscopic hematuria     Hypothyroidism     Gastroesophageal reflux disease, esophagitis presence not specified     Vitamin D deficiency     Class 1 obesity due to excess calories without serious comorbidity with body mass index (BMI) of 31.0 to 31.9 in adult     Polycystic ovaries     Female infertility associated with anovulation     Autoimmune thyroiditis     Missed period     Diabetes: No    Progress on goals from last visit: Patient reports she is starting to eat earlier in the morning. Recently started Wegvoy. Is going well. Has noticed her appetite is smaller. Still has hunger  "hormones (good). Personal goal to get down 120-125 lbs.     1. Start to having breakfast daily - met   2. Aim for 15-20g PRO at each meal - met   3. Be mindful of the 10/10 rule. - met    Dietary Recall:  Breakfast: boiled eggs x2 or toast with peanut butter  Lunch: leftovers or Microwave meal (Lean cuisine)  Dinner:boiled/steamed cabbage or chicken breast   Typical snacks: Fruit or granola car, nuts   Eating out: <1x per week   Beverages: Water (3-4 bottles of water daily)  Exercise:  1 hour workout from you tube at least 3x per week      Nutrition Diagnosis:    Overweight related to overeating and poor lifestyle habits as evidenced by patient report of skipping meals, not meeting protein needs and BMI 27.4 kg/m2      Intervention:  1. Food and/or nutrient delivery:   a. Discussed keeping diet \"colorful\" - aim to incorporate a variety of colored fruits and vegetables in diet  2. Nutrition education:  a. Discussed healthy and ststainable weight goals  3. Nutrition counseling:   a. Goal setting  b. Continued support for weight loss    Monitoring/Evaluation:    Goals:  1. Aim for 5-8k steps per day (when not working)  2. Continue with current goals set in place  3. Keep your diet \"colorful\"    Patient to follow up in 3 month(s) with bariatrician and 3 month(s) with RAFIQ      Video-Visit Details    Type of service:  Video Visit    Video Start Time (time video started): 10:55 am    Video End Time (time video stopped): 11:21 am    Originating Location (pt. Location): Home        Distant Location (provider location):  Off-site    Mode of Communication:  Video Conference via [facility]    Physician has received verbal consent for a Video Visit from the patient? Yes      Olga Thomson RD            Again, thank you for allowing me to participate in the care of your patient.        Sincerely,        Olga Thomson RD    "

## 2023-07-11 NOTE — PROGRESS NOTES
Laila Powell is 32 year old  female who presents for a billable video visit today.    How would you like to obtain your AVS? MyChart  If dropped from the video visit, the video invitation should be resent by: Text to cell phone: [phone]  Will anyone else be joining your video visit? No      Video Start Time: 8:14 AM    Are there any specific questions or needs that you would like addressed at your visit today? Refill      Video-Visit Details    Type of service:  Video Visit    Platform used for Video Visit: [email or web address]    Video End Time (time video stopped): 8:29 AM    Originating Location (pt. Location): Home        Distant Location (provider location):  Off-site    Distant Location (provider location):  [facility]     Bariatric Care Clinic Non Surgical Follow up Visit   Date of visit: 7/17/2023  Physician: JUDSON Grace MD, MD  Primary Care is Lyndon Velez.  Laila Powell   32 year old  female     Initial Weight: 143#    Today's Weight:   Wt Readings from Last 1 Encounters:   07/17/23 59.9 kg (132 lb)     Body mass index is 26.66 kg/m .           Assessment and Plan   Assessment: Laila is a 32 year old year old female who presents for medical weight management.      Plan:    1. Overweight (BMI 25.0-29.9)  Patient was congratulated on her success thus far. Healthy habits to assist with further weight loss were discussed. She will continue to try to increase her steps. She will continue the wegovy.     2. Polycystic ovaries  This may improve with healthy habits and weight loss.    3. Gastroesophageal reflux disease, unspecified whether esophagitis present  This may improve with healthy habits and weight loss.    Follow up in 1 month with the dietician and in 3 months with myself           INTERIM HISTORY  Patient is taking wegovy and she thinks it is helping to decrease her appetite. She denies side effects.     Goals set with dietician 4/26/23:  1. Aim for 5-8k  "steps per day (when not working)  2. Continue with current goals set in place  3. Keep your diet \"colorful\"    DIETARY HISTORY  Meals Per Day: 3  Eating Protein First?: yes  Food Diary: B:boiled eggs L:salads D:stir siddiqui, protein and vegetables and rice  Snacks Per Day: occasional  Typical Snack: fruits, crackers  Fluid Intake: 64 oz plus  Portion Control: yes  Calorie Containing Beverages: none  Meals at Restaurant per week:1-2    Positive Changes Since Last Visit: smaller portions, she is not thinking about food all of the time, less snacking  Struggling With: fatigue    Knowledgeable in Reading Food Labels: yes  Getting Adequate Protein: yes  Sleeping 7-8 hours/day trying  Stress management not discussed    PHYSICAL ACTIVITY PATTERNS:  5551-0130 steps per day on work days    REVIEW OF SYSTEMS  GENERAL/CONSTITUTIONAL:  Fatigue: sometimes  HEENT:   glaucoma: no  CARDIOVASCULAR:  History of heart disease: no  PULMONARY:  Dyspnea on exerdtion: sometmes  GI:  Pancreatitis: no  NEUROLOGIC:  Paresthesias: no  PSYCHIATRIC:  Moods: stable  ENDOCRINE:  Monitoring Blood Sugars: no  Sugars Well Controlled: na  No personal or family history of medullary thyroid cancer no  :  Birth control: not discussed       Patient Profile   Social History     Social History Narrative    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         Past Medical History   Past Medical History:   Diagnosis Date     Acute cystitis      Anemia 11/4/2013     Anemia      Candidiasis of vulva and vagina      Disease of thyroid gland      Dizziness      Headache      Hypothyroidism 11/4/2013     Hypothyroidism      Microscopic hematuria      Pharyngitis      Reflux gastritis      UTI (urinary tract infection)      Patient Active Problem List   Diagnosis     Health Care Home     Microscopic hematuria     Hypothyroidism     Gastroesophageal reflux disease, esophagitis presence not specified     " "Vitamin D deficiency     Class 1 obesity due to excess calories without serious comorbidity with body mass index (BMI) of 31.0 to 31.9 in adult     Polycystic ovaries     Female infertility associated with anovulation     Autoimmune thyroiditis     Missed period     Nutritional counseling       Past Surgical History  She has a past surgical history that includes Cholecystectomy (2013); ------------other------------- (10/06/2020); IR Abscess Drain Insertion (3/1/2021); IR Abscess Tube Change (3/9/2021); IR Abscess Drain Insertion (3/31/2021); Cholecystectomy; c/section, low transverse; Combined Augmentation Mammaplasty And Abdominoplasty; other surgical history; Ir Abscess Drain Insertion (3/1/2021); IR Abscess Drain Exchange (3/9/2021); and Ir Abscess Drain Insertion (3/31/2021).     Examination   Ht 1.499 m (4' 11\")   Wt 59.9 kg (132 lb)   BMI 26.66 kg/m    Wt Readings from Last 4 Encounters:   07/17/23 59.9 kg (132 lb)   04/10/23 62.1 kg (137 lb)   01/02/23 61.2 kg (135 lb)   10/03/22 63.5 kg (140 lb)     GENERAL: Healthy, alert and no distress  EYES: Eyes grossly normal to inspection.  No discharge or erythema, or obvious scleral/conjunctival abnormalities.  RESP: No audible wheeze, cough, or visible cyanosis.  No visible retractions or increased work of breathing.    SKIN: Visible skin clear. No significant rash, abnormal pigmentation or lesions.  NEURO: Cranial nerves grossly intact.  Mentation and speech appropriate for age.  PSYCH: Mentation appears normal, affect normal/bright, judgement and insight intact, normal speech and appearance well-groomed.       Counseling:   We reviewed the important post op bariatric recommendations:  -eating 3 meals daily  -eating protein first, getting >60gm protein daily  -eating slowly, chewing food well  -avoiding/limiting calorie containing beverages  -limiting starchy vegetables and carbohydrates, choosing wheat, not white with breads,   crackers, pastas, barbara, bagels, " tortillas, rice  -limiting restaurant or cafeteria eating to twice a week or less    We discussed the importance of restorative sleep and stress management in maintaining a healthy weight.  We discussed the National Weight Control Registry healthy weight maintenance strategies and ways to optimize metabolism.  We discussed the importance of physical activity including cardiovascular and strength training in maintaining a healthier weight.    Total time spent on the date of this encounter doing: chart review, review of test results, patient visit, physical exam, education, counseling, developing plan of care and documenting = 32 minutes.         JUDSON Grace MD  [facility]

## 2023-07-17 ENCOUNTER — VIRTUAL VISIT (OUTPATIENT)
Dept: SURGERY | Facility: CLINIC | Age: 33
End: 2023-07-17
Payer: COMMERCIAL

## 2023-07-17 VITALS — BODY MASS INDEX: 26.61 KG/M2 | HEIGHT: 59 IN | WEIGHT: 132 LBS

## 2023-07-17 DIAGNOSIS — E66.3 OVERWEIGHT (BMI 25.0-29.9): Primary | ICD-10-CM

## 2023-07-17 DIAGNOSIS — E28.2 POLYCYSTIC OVARIES: ICD-10-CM

## 2023-07-17 DIAGNOSIS — K21.9 GASTROESOPHAGEAL REFLUX DISEASE, UNSPECIFIED WHETHER ESOPHAGITIS PRESENT: ICD-10-CM

## 2023-07-17 DIAGNOSIS — R53.83 OTHER FATIGUE: ICD-10-CM

## 2023-07-17 PROCEDURE — 99214 OFFICE O/P EST MOD 30 MIN: CPT | Mod: VID | Performed by: FAMILY MEDICINE

## 2023-07-17 NOTE — LETTER
7/17/2023         RE: Laila Powell  [address]  [place] [address]        Dear Colleague,    [facility] you for referring your patient, Laila Powell, [facility] Please see a copy of my visit note below.    Laila Powell is 32 year old  female who presents for a billable video visit today.    How would you like to obtain your AVS? MyChart  If dropped from the video visit, the video invitation should be resent by: Text to cell phone: [phone]  Will anyone else be joining your video visit? No      Video Start Time: 8:14 AM    Are there any specific questions or needs that you would like addressed at your visit today? Refill      Video-Visit Details    Type of service:  Video Visit    Platform used for Video Visit: [employer]    Video End Time (time video stopped): 8:29 AM    Originating Location (pt. Location): Home        Distant Location (provider location):  Off-site    Distant Location (provider location):  [facility]     Bariatric Care Clinic Non Surgical Follow up Visit   Date of visit: 7/17/2023  Physician: JUDSON Grace MD, MD  Primary Care is Lyndon Velez.  Laila Powell   32 year old  female     Initial Weight: 143#    Today's Weight:   Wt Readings from Last 1 Encounters:   07/17/23 59.9 kg (132 lb)     Body mass index is 26.66 kg/m .           Assessment and Plan   Assessment: Laila is a 32 year old year old female who presents for medical weight management.      Plan:    1. Overweight (BMI 25.0-29.9)  Patient was congratulated on her success thus far. Healthy habits to assist with further weight loss were discussed. She will continue to try to increase her steps. She will continue the wegovy.     2. Polycystic ovaries  This may improve with healthy habits and weight loss.    3. Gastroesophageal reflux disease, unspecified whether esophagitis present  This may improve with healthy habits and  "weight loss.    Follow up in 1 month with the dietician and in 3 months with myself           INTERIM HISTORY  Patient is taking wegovy and she thinks it is helping to decrease her appetite. She denies side effects.     Goals set with dietician 4/26/23:  1. Aim for 5-8k steps per day (when not working)  2. Continue with current goals set in place  3. Keep your diet \"colorful\"    DIETARY HISTORY  Meals Per Day: 3  Eating Protein First?: yes  Food Diary: B:boiled eggs L:salads D:stir siddiqui, protein and vegetables and rice  Snacks Per Day: occasional  Typical Snack: fruits, crackers  Fluid Intake: 64 oz plus  Portion Control: yes  Calorie Containing Beverages: none  Meals at Restaurant per week:1-2    Positive Changes Since Last Visit: smaller portions, she is not thinking about food all of the time, less snacking  Struggling With: fatigue    Knowledgeable in Reading Food Labels: yes  Getting Adequate Protein: yes  Sleeping 7-8 hours/day trying  Stress management not discussed    PHYSICAL ACTIVITY PATTERNS:  2381-0559 steps per day on work days    REVIEW OF SYSTEMS  GENERAL/CONSTITUTIONAL:  Fatigue: sometimes  HEENT:   glaucoma: no  CARDIOVASCULAR:  History of heart disease: no  PULMONARY:  Dyspnea on exerdtion: sometmes  GI:  Pancreatitis: no  NEUROLOGIC:  Paresthesias: no  PSYCHIATRIC:  Moods: stable  ENDOCRINE:  Monitoring Blood Sugars: no  Sugars Well Controlled: na  No personal or family history of medullary thyroid cancer no  :  Birth control: not discussed       Patient Profile   Social History     Social History Narrative    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         Past Medical History   Past Medical History:   Diagnosis Date     Acute cystitis      Anemia 11/4/2013     Anemia      Candidiasis of vulva and vagina      Disease of thyroid gland      Dizziness      Headache      Hypothyroidism 11/4/2013     Hypothyroidism      Microscopic " "hematuria      Pharyngitis      Reflux gastritis      UTI (urinary tract infection)      Patient Active Problem List   Diagnosis     Health Care Home     Microscopic hematuria     Hypothyroidism     Gastroesophageal reflux disease, esophagitis presence not specified     Vitamin D deficiency     Class 1 obesity due to excess calories without serious comorbidity with body mass index (BMI) of 31.0 to 31.9 in adult     Polycystic ovaries     Female infertility associated with anovulation     Autoimmune thyroiditis     Missed period     Nutritional counseling       Past Surgical History  She has a past surgical history that includes Cholecystectomy (2013); ------------other------------- (10/06/2020); IR Abscess Drain Insertion (3/1/2021); IR Abscess Tube Change (3/9/2021); IR Abscess Drain Insertion (3/31/2021); Cholecystectomy; c/section, low transverse; Combined Augmentation Mammaplasty And Abdominoplasty; other surgical history; Ir Abscess Drain Insertion (3/1/2021); IR Abscess Drain Exchange (3/9/2021); and Ir Abscess Drain Insertion (3/31/2021).     Examination   Ht 1.499 m (4' 11\")   Wt 59.9 kg (132 lb)   BMI 26.66 kg/m    Wt Readings from Last 4 Encounters:   07/17/23 59.9 kg (132 lb)   04/10/23 62.1 kg (137 lb)   01/02/23 61.2 kg (135 lb)   10/03/22 63.5 kg (140 lb)     GENERAL: Healthy, alert and no distress  EYES: Eyes grossly normal to inspection.  No discharge or erythema, or obvious scleral/conjunctival abnormalities.  RESP: No audible wheeze, cough, or visible cyanosis.  No visible retractions or increased work of breathing.    SKIN: Visible skin clear. No significant rash, abnormal pigmentation or lesions.  NEURO: Cranial nerves grossly intact.  Mentation and speech appropriate for age.  PSYCH: Mentation appears normal, affect normal/bright, judgement and insight intact, normal speech and appearance well-groomed.       Counseling:   We reviewed the important post op bariatric recommendations:  -eating 3 " meals daily  -eating protein first, getting >60gm protein daily  -eating slowly, chewing food well  -avoiding/limiting calorie containing beverages  -limiting starchy vegetables and carbohydrates, choosing wheat, not white with breads,   crackers, pastas, barbara, bagels, tortillas, rice  -limiting restaurant or cafeteria eating to twice a week or less    We discussed the importance of restorative sleep and stress management in maintaining a healthy weight.  We discussed the National Weight Control Registry healthy weight maintenance strategies and ways to optimize metabolism.  We discussed the importance of physical activity including cardiovascular and strength training in maintaining a healthier weight.    Total time spent on the date of this encounter doing: chart review, review of test results, patient visit, physical exam, education, counseling, developing plan of care and documenting = 32 minutes.         JUDSON Grace MD  [facility]             Again, thank you for allowing me to participate in the care of your patient.        Sincerely,        JUDSON Grace MD

## 2023-07-22 ENCOUNTER — APPOINTMENT (OUTPATIENT)
Dept: LAB | Facility: CLINIC | Age: 33
End: 2023-07-22
Payer: COMMERCIAL

## 2023-08-06 ENCOUNTER — LAB (OUTPATIENT)
Dept: LAB | Facility: CLINIC | Age: 33
End: 2023-08-06
Payer: COMMERCIAL

## 2023-08-06 DIAGNOSIS — R53.83 OTHER FATIGUE: ICD-10-CM

## 2023-08-06 LAB — VIT B12 SERPL-MCNC: 444 PG/ML (ref 232–1245)

## 2023-08-06 PROCEDURE — 82607 VITAMIN B-12: CPT

## 2023-08-06 PROCEDURE — 36415 COLL VENOUS BLD VENIPUNCTURE: CPT

## 2023-10-12 NOTE — PROGRESS NOTES
Bariatric Care Clinic Non Surgical Follow up Visit   Date of visit: 10/18/2023  Physician: JUDSON Grace MD, MD  Primary Care is Lyndon Velez.  Laila Powell   32 year old  female     Initial Weight: 143#  Today's Weight:   Wt Readings from Last 1 Encounters:   10/18/23 57.2 kg (126 lb)     Body mass index is 25.45 kg/m .           Assessment and Plan   Assessment: Laila is a 32 year old year old female who presents for medical weight management.      Plan:1. Overweight (BMI 25.0-29.9)  Patient was congratulated on her success thus far. Healthy habits to assist with further weight loss were discussed. She will try to increase her protein at breakfast, possibly adding collagen powder to her oatmeal. She will continue the wegovy. We discussed the patient's co-morbid conditions including GERD and PCOS. These likely will improve with healthy habits and weight loss.     2. Gastroesophageal reflux disease, unspecified whether esophagitis present  This may improve with healthy habits and weight loss.     3. Polycystic ovaries  This may improve with healthy habits and weight loss.          Follow up in 3 months with myself           INTERIM HISTORY  Patient is taking wegovy for appetite and craving control, 2.4 mg per week. She feels it is helping to control her appetite. She denies side effects. She is having a little more stomach growling and some cravings but is happy with her weight loss thus far..    DIETARY HISTORY  Meals Per Day: 3  Eating Protein First?: sometimes  Food Diary: B:oatmeal or toast  L:salad and yogurt and fruit, occasional protein bowl D:chicken, sometimes vegetables  Snacks Per Day: a couple of times a week  Typical Snack: peanut butter crackers  Fluid Intake: 64 oz  Portion Control: yes  Calorie Containing Beverages: none  Choosing Whole Grains: sometimes  Meals at Restaurant per week:1-2    Positive Changes Since Last Visit: decreased fat, portion control, increased water, sleep  Struggling With:  cravings at times    Knowledgeable in Reading Food Labels: yes  Getting Adequate Protein: sometimes  Sleeping 7-8 hours/day yes      PHYSICAL ACTIVITY PATTERNS:  Walking outside, thinking about joining a gym    REVIEW OF SYSTEMS  GENERAL/CONSTITUTIONAL:  Fatigue: sometimes  HEENT:   glaucoma: no  CARDIOVASCULAR:  History of heart disease: no  PULMONARY:  Dyspnea on exertion: sometimes  GI:  Pancreatitis: no  ENDOCRINE:  Monitoring Blood Sugars: no  Sugars Well Controlled: na  No personal or family history of medullary thyroid cancer no  :  Birth control: not discussed       Patient Profile   Social History     Social History [place]    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         Past Medical History   Past Medical History:   Diagnosis Date    Acute cystitis     Anemia 11/4/2013    Anemia     Candidiasis of vulva and vagina     Disease of thyroid gland     Dizziness     Headache     Hypothyroidism 11/4/2013    Hypothyroidism     Microscopic hematuria     Pharyngitis     Reflux gastritis     UTI (urinary tract infection)      Patient Active Problem List   Diagnosis    Health Care Home    Microscopic hematuria    Hypothyroidism    Gastroesophageal reflux disease, esophagitis presence not specified    Vitamin D deficiency    Class 1 obesity due to excess calories without serious comorbidity with body mass index (BMI) of 31.0 to 31.9 in adult    Polycystic ovaries    Female infertility associated with anovulation    Autoimmune thyroiditis    Missed period    Nutritional counseling       Past Surgical History  She has a past surgical history that includes Cholecystectomy (2013); ------------other------------- (10/06/2020); IR Abscess Drain Insertion (3/1/2021); IR Abscess Tube Change (3/9/2021); IR Abscess Drain Insertion (3/31/2021); Cholecystectomy; c/section, low transverse; Combined Augmentation Mammaplasty And Abdominoplasty; other surgical  "history; Ir Abscess Drain Insertion (3/1/2021); IR Abscess Drain Exchange (3/9/2021); and Ir Abscess Drain Insertion (3/31/2021).     Examination   Ht 1.499 m (4' 11\")   Wt 57.2 kg (126 lb)   BMI 25.45 kg/m    Wt Readings from Last 4 Encounters:   10/18/23 57.2 kg (126 lb)   07/17/23 59.9 kg (132 lb)   04/10/23 62.1 kg (137 lb)   01/02/23 61.2 kg (135 lb)      GENERAL: alert and no distress         Counseling:   We reviewed the important post op bariatric recommendations:  -eating 3 meals daily  -eating protein first, getting >60gm protein daily  -eating slowly, chewing food well  -avoiding/limiting calorie containing beverages  -limiting starchy vegetables and carbohydrates, choosing wheat, not white with breads,   crackers, pastas, barbara, bagels, tortillas, rice  -limiting restaurant or cafeteria eating to twice a week or less    We discussed the importance of restorative sleep and stress management in maintaining a healthy weight.  We discussed the National Weight Control Registry healthy weight maintenance strategies and ways to optimize metabolism.  We discussed the importance of physical activity including cardiovascular and strength training in maintaining a healthier weight.    Total time spent on the date of this encounter doing: chart review, review of test results, patient visit, physical exam, education, counseling, developing plan of care and documenting = 20 minutes.         JUDSON Grace MD  [facility]           The patient has been notified of following:     \"This telephone visit will be conducted via a call between you and your physician/provider. We have found that certain health care needs can be provided without the need for a physical exam.  This service lets us provide the care you need with a short phone conversation.  If a prescription is necessary we can send it directly to your pharmacy.  If lab work is needed we can place an order for that and you can then stop " "by our lab to have the test done at a later time.    Telephone visits are billed at different rates depending on your insurance coverage. During this emergency period, for some insurers they may be billed the same as an in-person visit.  Please reach out to your insurance provider with any questions.    If during the course of the call the physician/provider feels a telephone visit is not appropriate, you will not be charged for this service.\"    Patient has given verbal consent to a Telephone visit? Yes    What phone number would you like to be contacted at? [phone]     Patient would like to receive their AVS by [employer]    Are there any specific questions or needs that you would like addressed at your visit today? No questions or concerns today.      Telephone Start Time: 8:12 AM    Telephone End Time (time telephone stopped): 8:25 AM    Originating Patient Location (pt. Location): Home      Distant Location (provider location):  On-site    Distant Location (provider location):  [facility] SURG[facility] AND BARIATR[facility] CARE [place]              "

## 2023-10-18 ENCOUNTER — VIRTUAL VISIT (OUTPATIENT)
Dept: SURGERY | Facility: CLINIC | Age: 33
End: 2023-10-18
Payer: COMMERCIAL

## 2023-10-18 VITALS — WEIGHT: 126 LBS | HEIGHT: 59 IN | BODY MASS INDEX: 25.4 KG/M2

## 2023-10-18 DIAGNOSIS — K21.9 GASTROESOPHAGEAL REFLUX DISEASE, UNSPECIFIED WHETHER ESOPHAGITIS PRESENT: ICD-10-CM

## 2023-10-18 DIAGNOSIS — E28.2 POLYCYSTIC OVARIES: ICD-10-CM

## 2023-10-18 DIAGNOSIS — E66.3 OVERWEIGHT (BMI 25.0-29.9): Primary | ICD-10-CM

## 2023-10-18 PROCEDURE — 99213 OFFICE O/P EST LOW 20 MIN: CPT | Mod: 95 | Performed by: FAMILY MEDICINE

## 2023-10-18 RX ORDER — FAMOTIDINE 20 MG/1
TABLET, FILM COATED ORAL
COMMUNITY
Start: 2023-10-17

## 2023-10-18 RX ORDER — NORGESTREL AND ETHINYL ESTRADIOL 0.3-0.03MG
1 KIT ORAL DAILY
COMMUNITY
End: 2024-01-29

## 2023-10-18 RX ORDER — COVID-19 MOLECULAR TEST ASSAY
KIT MISCELLANEOUS
COMMUNITY
Start: 2023-09-21

## 2023-10-18 NOTE — LETTER
10/18/2023         RE: Laila Powell  [id number]  [place] [address]        Dear Colleague,    Thank you for referring your patient, Laila Powell, to the [facility] AND BARIATRICS CARE [place]. Please see a copy of my visit note below.    Bariatric Care Clinic Non Surgical Follow up Visit   Date of visit: 10/18/2023  Physician: JUDSON Grace MD, MD  Primary Care is Lyndon Velez.  Laila Powell   32 year old  female     Initial Weight: 143#  Today's Weight:   Wt Readings from Last 1 Encounters:   10/18/23 57.2 kg (126 lb)     Body mass index is 25.45 kg/m .           Assessment and Plan   Assessment: Laila is a 32 year old year old female who presents for medical weight management.      Plan:1. Overweight (BMI 25.0-29.9)  Patient was congratulated on her success thus far. Healthy habits to assist with further weight loss were discussed. She will try to increase her protein at breakfast, possibly adding collagen powder to her oatmeal. She will continue the wegovy. We discussed the patient's co-morbid conditions including GERD and PCOS. These likely will improve with healthy habits and weight loss.     2. Gastroesophageal reflux disease, unspecified whether esophagitis present  This may improve with healthy habits and weight loss.     3. Polycystic ovaries  This may improve with healthy habits and weight loss.          Follow up in 3 months with myself           INTERIM HISTORY  Patient is taking wegovy for appetite and craving control, 2.4 mg per week. She feels it is helping to control her appetite. She denies side effects. She is having a little more stomach growling and some cravings but is happy with her weight loss thus far..    DIETARY HISTORY  Meals Per Day: 3  Eating Protein First?: sometimes  Food Diary: B:oatmeal or toast  L:salad and yogurt and fruit, occasional protein bowl D:chicken, sometimes vegetables  Snacks Per Day: a couple of times a week  Typical Snack: peanut butter  crackers  Fluid Intake: 64 oz  Portion Control: yes  Calorie Containing Beverages: none  Choosing Whole Grains: sometimes  Meals at Restaurant per week:1-2    Positive Changes Since Last Visit: decreased fat, portion control, increased water, sleep  Struggling With: cravings at times    Knowledgeable in Reading Food Labels: yes  Getting Adequate Protein: sometimes  Sleeping 7-8 hours/day yes      PHYSICAL ACTIVITY PATTERNS:  Walking outside, thinking about joining a gym    REVIEW OF SYSTEMS  GENERAL/CONSTITUTIONAL:  Fatigue: sometimes  HEENT:   glaucoma: no  CARDIOVASCULAR:  History of heart disease: no  PULMONARY:  Dyspnea on exertion: sometimes  GI:  Pancreatitis: no  ENDOCRINE:  Monitoring Blood Sugars: no  Sugars Well Controlled: na  No personal or family history of medullary thyroid cancer no  :  Birth control: not discussed       Patient Profile   Social History     Social History Narrative    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         Past Medical History   Past Medical History:   Diagnosis Date     Acute cystitis      Anemia 11/4/2013     Anemia      Candidiasis of vulva and vagina      Disease of thyroid gland      Dizziness      Headache      Hypothyroidism 11/4/2013     Hypothyroidism      Microscopic hematuria      Pharyngitis      Reflux gastritis      UTI (urinary tract infection)      Patient Active Problem List   Diagnosis     Health Care Home     Microscopic hematuria     Hypothyroidism     Gastroesophageal reflux disease, esophagitis presence not specified     Vitamin D deficiency     Class 1 obesity due to excess calories without serious comorbidity with body mass index (BMI) of 31.0 to 31.9 in adult     Polycystic ovaries     Female infertility associated with anovulation     Autoimmune thyroiditis     Missed period     Nutritional counseling       Past Surgical History  She has a past surgical history that includes  "Cholecystectomy (2013); ------------other------------- (10/06/2020); IR Abscess Drain Insertion (3/1/2021); IR Abscess Tube Change (3/9/2021); IR Abscess Drain Insertion (3/31/2021); Cholecystectomy; c/section, low transverse; Combined Augmentation Mammaplasty And Abdominoplasty; other surgical history; Ir Abscess Drain Insertion (3/1/2021); IR Abscess Drain Exchange (3/9/2021); and Ir Abscess Drain Insertion (3/31/2021).     Examination   Ht 1.499 m (4' 11\")   Wt 57.2 kg (126 lb)   BMI 25.45 kg/m    Wt Readings from Last 4 Encounters:   10/18/23 57.2 kg (126 lb)   07/17/23 59.9 kg (132 lb)   04/10/23 62.1 kg (137 lb)   01/02/23 61.2 kg (135 lb)      GENERAL: alert and no distress         Counseling:   We reviewed the important post op bariatric recommendations:  -eating 3 meals daily  -eating protein first, getting >60gm protein daily  -eating slowly, chewing food well  -avoiding/limiting calorie containing beverages  -limiting starchy vegetables and carbohydrates, choosing wheat, not white with breads,   crackers, pastas, barbara, bagels, tortillas, rice  -limiting restaurant or cafeteria eating to twice a week or less    We discussed the importance of restorative sleep and stress management in maintaining a healthy weight.  We discussed the National Weight Control Registry healthy weight maintenance strategies and ways to optimize metabolism.  We discussed the importance of physical activity including cardiovascular and strength training in maintaining a healthier weight.    Total time spent on the date of this encounter doing: chart review, review of test results, patient visit, physical exam, education, counseling, developing plan of care and documenting = 20 minutes.         JUDSON Grace MD  [facility]           The patient has been notified of following:     \"This telephone visit will be conducted via a call between you and your physician/provider. We have found that certain health " "care needs can be provided without the need for a physical exam.  This service lets us provide the care you need with a short phone conversation.  If a prescription is necessary we can send it directly to your pharmacy.  If lab work is needed we can place an order for that and you can then stop by our lab to have the test done at a later time.    Telephone visits are billed at different rates depending on your insurance coverage. During this emergency period, for some insurers they may be billed the same as an in-person visit.  Please reach out to your insurance provider with any questions.    If during the course of the call the physician/provider feels a telephone visit is not appropriate, you will not be charged for this service.\"    Patient has given verbal consent to a Telephone visit? Yes    What phone number would you like to be contacted at? [phone]     Patient would like to receive their AVS by [facility]    Are there any specific questions or needs that you would like addressed at your visit today? No questions or concerns today.      Telephone Start Time: 8:12 AM    Telephone End Time (time telephone stopped): 8:25 AM    Originating Patient Location (pt. Location): Home      Distant Location (provider location):  On-site    Distant Location (provider location):  [facility] SURGERY [facility] AND BARIATRICS CARE [place]                  Again, thank you for allowing me to participate in the care of your patient.        Sincerely,        JUDSON Garce MD  "

## 2023-11-10 ENCOUNTER — TELEPHONE (OUTPATIENT)
Dept: SURGERY | Facility: CLINIC | Age: 33
End: 2023-11-10
Payer: COMMERCIAL

## 2023-11-10 NOTE — TELEPHONE ENCOUNTER
Central Prior Authorization Team   Phone: [phone]        PA Initiation    Medication: WEGOVY 2.4 MG/0.75ML SC SOAJ  Insurance Company: [employer] - Phone [phone] Fax [phone]  Pharmacy Filling the Rx: [id number] [place], [place] - [address] [facility]  [address]  Filling Pharmacy Phone: [phone]  Filling Pharmacy Fax:    Start Date: 11/10/2023    Case Number: [id number]    Amanda from patient's insurance called to get additional information for the requested medication.  Provided over the phone.

## 2023-11-14 NOTE — TELEPHONE ENCOUNTER
Central Prior Authorization Team   Phone: [phone]      Prior Authorization Approval    Medication: WEGOVY 2.4 MG/0.75ML SC SOAJ  Authorization Effective Date: 1/1/2023  Authorization Expiration Date: 3/21/2024  Approved Dose/Quantity: 4 pens per 28 days  Reference #:     Insurance Company: [employer] [facility] - Phone [phone] Fax [phone]  Expected CoPay: $    CoPay Card Available: No    Financial Assistance Needed: n/a  Which Pharmacy is filling the prescription: [id number] [place], [place] - [address] AT [facility] [address] [facility] [address]  Pharmacy Notified: yes  Patient Notified: no **Instructed pharmacy to notify patient when script is ready to /ship.**

## 2024-01-25 NOTE — PROGRESS NOTES
Bariatric Care Clinic Non Surgical Follow up Visit   Date of visit: 1/29/2024  Physician: JUDSON Grace MD, MD  Primary Care is Lyndon Velez.  Laila Powell   33 year old  female     Initial Weight: 143#  Today's Weight:   Wt Readings from Last 1 Encounters:   01/29/24 55.8 kg (123 lb)     Body mass index is 24.84 kg/m .           Assessment and Plan   Assessment: Laila is a 33 year old year old female who presents for medical weight management.      Plan:    1. Hyperphagia  Patient was congratulated on her success thus far. Healthy habits to assist with further weight loss were discussed. She will work on replacing some of the carbohydrates in her diet with protein. She will continue the wegovy.     2. Gastroesophageal reflux disease, unspecified whether esophagitis present  This may improve with healthy habits and weight loss.     3. Polycystic ovaries  This may improve with healthy habits and weight loss.      Follow up in 3-4 months with myself           INTERIM HISTORY  Patient is taking wegovy for appetite and craving control and she thinks that it is helping to control her appetite but she is struggling with cravings. She is trying to mix up her diet a bit. She has been having some bloating and headaches. She states she is having a bowel movement daily.    DIETARY HISTORY  Meals Per Day: 3  Eating Protein First?: No  Food Diary: B:toasted bread with strawberry cream cheese L:salad with vegetables and fruit, sometimes chicken D:pasta or stir siddiqui  Snacks Per Day: occasional  Typical Snack: crackers and cheese, yogurt, ethan bar  Fluid Intake: 160 oz per day?  Portion Control: yes  Calorie Containing Beverages: none   Meals at Restaurant per week:1-2    Positive Changes Since Last Visit: portion control  Struggling With: sleep sometimes    Knowledgeable in Reading Food Labels: yes  Getting Adequate Protein: no  Sleeping 7-8 hours/day sometimes      PHYSICAL ACTIVITY PATTERNS:  Cardio videos every day for 60-90  minutes    REVIEW OF SYSTEMS  GENERAL/CONSTITUTIONAL:  Fatigue: sometimes  HEENT:   glaucoma: no  CARDIOVASCULAR:  History of heart disease: no  GI:  Pancreatitis: no  NEUROLOGIC:  Paresthesias: no  History of migraine headaches: history of  PSYCHIATRIC:  Moods: stable  MUSCULOSKELETAL/RHEUMATOLOGIC  Arthralgias: no  Myalgias: no  ENDOCRINE:  Monitoring Blood Sugars: no  Sugars Well Controlled: na  No personal or family history of medullary thyroid cancer no  :  Birth control: she states she is not sexually active, will use condoms if this changes  History of kidney stones: no     Patient Profile   Social History     Social History Narrative    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         Past Medical History   Past Medical History:   Diagnosis Date    Acute cystitis     Anemia 11/4/2013    Anemia     Candidiasis of vulva and vagina     Disease of thyroid gland     Dizziness     Headache     Hypothyroidism 11/4/2013    Hypothyroidism     Microscopic hematuria     Pharyngitis     Reflux gastritis     UTI (urinary tract infection)      Patient Active Problem List   Diagnosis    Health Care Home    Microscopic hematuria    Hypothyroidism    Gastroesophageal reflux disease, esophagitis presence not specified    Vitamin D deficiency    Class 1 obesity due to excess calories without serious comorbidity with body mass index (BMI) of 31.0 to 31.9 in adult    Polycystic ovaries    Female infertility associated with anovulation    Autoimmune thyroiditis    Missed period    Nutritional counseling       Past Surgical History  She has a past surgical history that includes Cholecystectomy (2013); ------------other------------- (10/06/2020); IR Abscess Drain Insertion (3/1/2021); IR Abscess Tube Change (3/9/2021); IR Abscess Drain Insertion (3/31/2021); Cholecystectomy; c/section, low transverse; Combined Augmentation Mammaplasty And Abdominoplasty; other surgical  "history; Ir Abscess Drain Insertion (3/1/2021); IR Abscess Drain Exchange (3/9/2021); and Ir Abscess Drain Insertion (3/31/2021).     Examination   Ht 1.499 m (4' 11\")   Wt 55.8 kg (123 lb)   BMI 24.84 kg/m    Wt Readings from Last 4 Encounters:   01/29/24 55.8 kg (123 lb)   10/18/23 57.2 kg (126 lb)   07/17/23 59.9 kg (132 lb)   04/10/23 62.1 kg (137 lb)    GENERAL: alert and no distress  EYES: Eyes grossly normal to inspection.  No discharge or erythema, or obvious scleral/conjunctival abnormalities.  RESP: No audible wheeze, cough, or visible cyanosis.    SKIN: Visible skin clear. No significant rash, abnormal pigmentation or lesions.  NEURO: Cranial nerves grossly intact.  Mentation and speech appropriate for age.  PSYCH: Appropriate affect, tone, and pace of words        Counseling:   We reviewed the important post op bariatric recommendations:  -eating 3 meals daily  -eating protein first, getting >60gm protein daily  -eating slowly, chewing food well  -avoiding/limiting calorie containing beverages  -limiting starchy vegetables and carbohydrates, choosing wheat, not white with breads,   crackers, pastas, barbara, bagels, tortillas, rice  -limiting restaurant or cafeteria eating to twice a week or less    We discussed the importance of restorative sleep and stress management in maintaining a healthy weight.  We discussed the National Weight Control Registry healthy weight maintenance strategies and ways to optimize metabolism.  We discussed the importance of physical activity including cardiovascular and strength training in maintaining a healthier weight.    Total time spent on the date of this encounter doing: chart review, review of test results, patient visit, physical exam, education, counseling, developing plan of care and documenting = 29  .          P. Aria Grace MD  [facility]                 "

## 2024-01-29 ENCOUNTER — VIRTUAL VISIT (OUTPATIENT)
Dept: SURGERY | Facility: CLINIC | Age: 34
End: 2024-01-29
Payer: COMMERCIAL

## 2024-01-29 VITALS — BODY MASS INDEX: 24.8 KG/M2 | WEIGHT: 123 LBS | HEIGHT: 59 IN

## 2024-01-29 DIAGNOSIS — E66.3 OVERWEIGHT (BMI 25.0-29.9): ICD-10-CM

## 2024-01-29 DIAGNOSIS — R63.2 HYPERPHAGIA: Primary | ICD-10-CM

## 2024-01-29 DIAGNOSIS — E28.2 POLYCYSTIC OVARIES: ICD-10-CM

## 2024-01-29 DIAGNOSIS — K21.9 GASTROESOPHAGEAL REFLUX DISEASE, UNSPECIFIED WHETHER ESOPHAGITIS PRESENT: ICD-10-CM

## 2024-01-29 PROCEDURE — 99442 PR PHYSICIAN TELEPHONE EVALUATION 11-20 MIN: CPT | Mod: 93 | Performed by: FAMILY MEDICINE

## 2024-01-29 ASSESSMENT — PAIN SCALES - GENERAL: PAINLEVEL: NO PAIN (0)

## 2024-01-29 NOTE — LETTER
1/29/2024         RE: Laila Powell  [address]  [place] [address]        Dear Colleague,    Thank you for referring your patient, Laila Powell, to the [facility] SURGERY CLINIC AND BARIATRICS CARE [place]. Please see a copy of my visit note below.    Bariatric Care Clinic Non Surgical Follow up Visit   Date of visit: 1/29/2024  Physician: JUDSON Grace MD, MD  Primary Care is Lyndon Velez.  Laila Powell   33 year old  female     Initial Weight: 143#  Today's Weight:   Wt Readings from Last 1 Encounters:   01/29/24 55.8 kg (123 lb)     Body mass index is 24.84 kg/m .           Assessment and Plan   Assessment: Laila is a 33 year old year old female who presents for medical weight management.      Plan:    1. Hyperphagia  Patient was congratulated on her success thus far. Healthy habits to assist with further weight loss were discussed. She will work on replacing some of the carbohydrates in her diet with protein. She will continue the wegovy.     2. Gastroesophageal reflux disease, unspecified whether esophagitis present  This may improve with healthy habits and weight loss.     3. Polycystic ovaries  This may improve with healthy habits and weight loss.      Follow up in 3-4 months with myself           INTERIM HISTORY  Patient is taking wegovy for appetite and craving control and she thinks that it is helping to control her appetite but she is struggling with cravings. She is trying to mix up her diet a bit. She has been having some bloating and headaches. She states she is having a bowel movement daily.    DIETARY HISTORY  Meals Per Day: 3  Eating Protein First?: No  Food Diary: B:toasted bread with strawberry cream cheese L:salad with vegetables and fruit, sometimes chicken D:pasta or stir siddiqui  Snacks Per Day: occasional  Typical Snack: crackers and cheese, yogurt, ethan bar  Fluid Intake: 160 oz per day?  Portion Control: yes  Calorie Containing Beverages: none   Meals at Restaurant per  week:1-2    Positive Changes Since Last Visit: portion control  Struggling With: sleep sometimes    Knowledgeable in Reading Food Labels: yes  Getting Adequate Protein: no  Sleeping 7-8 hours/day sometimes      PHYSICAL ACTIVITY PATTERNS:  Cardio videos every day for 60-90 minutes    REVIEW OF SYSTEMS  GENERAL/CONSTITUTIONAL:  Fatigue: sometimes  HEENT:   glaucoma: no  CARDIOVASCULAR:  History of heart disease: no  GI:  Pancreatitis: no  NEUROLOGIC:  Paresthesias: no  History of migraine headaches: history of  PSYCHIATRIC:  Moods: stable  MUSCULOSKELETAL/RHEUMATOLOGIC  Arthralgias: no  Myalgias: no  ENDOCRINE:  Monitoring Blood Sugars: no  Sugars Well Controlled: na  No personal or family history of medullary thyroid cancer no  :  Birth control: she states she is not sexually active, will use condoms if this changes  History of kidney stones: no     Patient Profile   Social History     Social History Narrative    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         Past Medical History   Past Medical History:   Diagnosis Date     Acute cystitis      Anemia 11/4/2013     Anemia      Candidiasis of vulva and vagina      Disease of thyroid gland      Dizziness      Headache      Hypothyroidism 11/4/2013     Hypothyroidism      Microscopic hematuria      Pharyngitis      Reflux gastritis      UTI (urinary tract infection)      Patient Active Problem List   Diagnosis     Health Care Home     Microscopic hematuria     Hypothyroidism     Gastroesophageal reflux disease, esophagitis presence not specified     Vitamin D deficiency     Class 1 obesity due to excess calories without serious comorbidity with body mass index (BMI) of 31.0 to 31.9 in adult     Polycystic ovaries     Female infertility associated with anovulation     Autoimmune thyroiditis     Missed period     Nutritional counseling       Past Surgical History  She has a past surgical history that  "includes Cholecystectomy (2013); ------------other------------- (10/06/2020); IR Abscess Drain Insertion (3/1/2021); IR Abscess Tube Change (3/9/2021); IR Abscess Drain Insertion (3/31/2021); Cholecystectomy; c/section, low transverse; Combined Augmentation Mammaplasty And Abdominoplasty; other surgical history; Ir Abscess Drain Insertion (3/1/2021); IR Abscess Drain Exchange (3/9/2021); and Ir Abscess Drain Insertion (3/31/2021).     Examination   Ht 1.499 m (4' 11\")   Wt 55.8 kg (123 lb)   BMI 24.84 kg/m    Wt Readings from Last 4 Encounters:   01/29/24 55.8 kg (123 lb)   10/18/23 57.2 kg (126 lb)   07/17/23 59.9 kg (132 lb)   04/10/23 62.1 kg (137 lb)    GENERAL: alert and no distress  EYES: Eyes grossly normal to inspection.  No discharge or erythema, or obvious scleral/conjunctival abnormalities.  RESP: No audible wheeze, cough, or visible cyanosis.    SKIN: Visible skin clear. No significant rash, abnormal pigmentation or lesions.  NEURO: Cranial nerves grossly intact.  Mentation and speech appropriate for age.  PSYCH: Appropriate affect, tone, and pace of words        Counseling:   We reviewed the important post op bariatric recommendations:  -eating 3 meals daily  -eating protein first, getting >60gm protein daily  -eating slowly, chewing food well  -avoiding/limiting calorie containing beverages  -limiting starchy vegetables and carbohydrates, choosing wheat, not white with breads,   crackers, pastas, barbara, bagels, tortillas, rice  -limiting restaurant or cafeteria eating to twice a week or less    We discussed the importance of restorative sleep and stress management in maintaining a healthy weight.  We discussed the National Weight Control Registry healthy weight maintenance strategies and ways to optimize metabolism.  We discussed the importance of physical activity including cardiovascular and strength training in maintaining a healthier weight.    Total time spent on the date of this encounter doing: " chart review, review of test results, patient visit, physical exam, education, counseling, developing plan of care and documenting = 29  .          JUDSON Grace MD  [facility]                 Virtual Visit Details    Type of service:  Telephone Visit   Phone call duration: 17 minutes       Again, thank you for allowing me to participate in the care of your patient.        Sincerely,        JUDSON Grace MD

## 2024-01-29 NOTE — NURSING NOTE
Is the patient currently in the state of [place]? YES    Visit mode:TELEPHONE    If the visit is dropped, the patient can be reconnected by: TELEPHONE VISIT: Phone number: [phone]    Will anyone else be joining the visit? NO  (If patient encounters technical issues they should call [phone]    How would you like to obtain your AVS? MyChart    Are changes needed to the allergy or medication list? Pt stated no changes to allergies and Pt stated no med changes    Reason for visit: Follow Up    Terese VALLEJO

## 2024-01-29 NOTE — PATIENT INSTRUCTIONS
Eat Better ? Move More ? Live Well    Eat 3 nutrient-rich meals each day    Don t skip meals--it will cause you to overeat later in the day!    Eating fiber (vegetables/fruits/whole grains) and protein with meals helps you stay full longer    Choose foods with less than 10 grams of sugar and 5 grams of fat per serving to prevent excess calories and weight re-gain  Eat around the same times each day to develop a routine eating schedule   Avoid snacking unless physically hungry.   Planned snacks: 1-2 times per day and no more than 150 calories    Eat protein first   Protein helps with healing, maintaining adequate muscle mass, reducing hunger and optimizing nutritional status   Aim for 60-80 grams of protein per day   Fill up on Fiber   Fiber comes from plants--fruits, veggies, whole grains, nuts/seeds and beans   Fiber is low in calories, high in phytonutrients and helps you stay full longer   Aim for 25-35 grams per day by eating fiber with meals and snacks  Eat S-L-[id number]-W-L-Y   Take 20-30 minutes to eat each meal by taking small bites, chewing foods to applesauce consistency or 20-30 times before you swallow   Eating foods too fast can delay satiety/fullness signals and increase overeating   Slow down your eating by using toddler utensils, putting your fork/spoon down between bites and not watching TV or emailing during meals!   Keep a Journal         Writing down what you eat, how you feel and when you are active helps you identify new changes to work on from week to week         Look for ways to cut 100 calories from your current diet 2-3 times per day  Drink 64 ounces of 0-Calorie drinks between meals   Water   Zero calorie Propel  or Vitamin Water     SoBe [place]  Zero Calories   Crystal Light , Sugar-Free Ray-Aid , and other sugar-free lemonade or flavored jacinto   Keep Caffeine to less than 300mg per day ie: 3-6oz cups coffee     Work up to 45-60 minutes of physical activity most days of the week   Helps  with losing weight and prevent regaining those extra pounds!    Do a combo of cardio (walking/water exercises) and strength training (lifting weights/Vinyasa yoga)    Avoid Mindless Eating   Be present when you eat--take note of the smell, taste and quality of your food   Make a list of alternative activities you could do to prevent eating out of boredom/stress  Go for a walk, call a friend, chew gum, paint your nails, re-organize the garage, etc       LEAN PROTEIN SOURCES      Protein Source Portion Calories Grams of Protein                           Nonfat, plain Greek yogurt    (10 grams sugar or less) 3/4 cup (6 oz)  12-17   Light Yogurt (10 grams sugar or less) 3/4 cup (6 oz)  6-8   Protein Shake 1 shake 110-180 15-30   Skim/1% Milk or lactose-free milk 1 cup ( 8 oz)  8   Plain or light, flavored soymilk 1 cup  7-8   Plain or light, hemp milk 1 cup 110 6   Fat Free or 1% Cottage Cheese 1/2 cup 90 15   Part skim ricotta cheese 1/2 cup 100 14   Part skim or reduced fat cheese slices 1 ounce 65-80 8     Mozzarella String Cheese 1 80 8   Canned tuna, chicken, crab or salmon  (canned in water)  1/2 cup 100 15-20   White fish (broiled, grilled, baked) 3 ounces 100 21   [place]/Tuna (broiled, grilled, baked) 3 ounces 150-180 21   Shrimp, Scallops, Lobster, Crab 3 ounces 100 21   Pork loin, Pork Tenderloin 3 ounces 150 21   Boneless, skinless chicken /turkey breast                          (broiled, grilled, baked) 3 ounces 120 21   [place], [place], [place], and Venison 3 ounces 120 21   Lean cuts of red meat and pork (sirloin,   round, tenderloin, flank, ground 93%-96%) 3 ounces 170 21   Lean or Extra Lean Ground Turkey 1/2 cup 150 20   90-95% Lean [place] Burger 1 consuelo 140-180 21   Low-fat casserole with lean meat 3/4 cup 200 17   Luncheon Meats                                                        (turkey, lean ham, roast beef, chicken) 3 ounces 100 21   Egg (boiled, poached, scrambled) 1 Egg 60 7    Egg Substitute 1/2 cup 70 10   Nuts (limit to 1 serving per day)  3 Tbsp. 150 7   Nut [place] (peanut, almond)  Limit to 1 serving or less daily 1 Tbsp. 90 4   Soy Burger (varies) 1  15   Garbanzo, Black, Palumbo Beans 1/2 cup 110 7   Refried Beans 1/2 cup 100 7   Kidney and Lima beans 1/2 cup 110 7   Tempeh 3 oz 175 18   Vegan crumbles 1/2 cup 100 14   Tofu 1/2 cup 110 14   Chili (beans and extra lean beef or turkey) 1 cup 200 23   Lentil Stew/Soup 1 cup 150 12   Black Bean Soup 1 cup 175 12

## 2024-03-27 ENCOUNTER — TELEPHONE (OUTPATIENT)
Dept: SURGERY | Facility: CLINIC | Age: 34
End: 2024-03-27
Payer: COMMERCIAL

## 2024-03-27 NOTE — TELEPHONE ENCOUNTER
Prior Authorization Retail Medication Request    Medication/Dose: Wegovy 2.4mg/0.75ml Auto-injectors  New/renewal/insurance change PA/secondary ins. PA: RENEWAL  Previously Tried and Failed:  Has been using this medication with good results.   Rationale:  Previous approval  on 3/21/2024    Key: [id number]    Pharmacy Information (if different than what is on RX)  Name:  [facility], [place]  Phone:  [phone]  Fax:  [phone]

## 2024-04-09 NOTE — TELEPHONE ENCOUNTER
Prior Authorization Approval    Medication: WEGOVY 2.4 MG/0.75ML SC SOAJ  Authorization Effective Date: 4/9/2024  Authorization Expiration Date: 4/8/2024  Approved Dose/Quantity: 3/28  Reference #: [id number]   Insurance Company: [employer] [place] - Phone [phone] Fax [phone]  Expected CoPay: $    CoPay Card Available:      Financial Assistance Needed:   Which Pharmacy is filling the prescription: [id number] - [place], [place] - [address] AT [facility] [address] [facility] [address]  Pharmacy Notified: Yes  Patient Notified: Yes

## 2024-05-16 NOTE — PROGRESS NOTES
Bariatric Care Clinic Non Surgical Follow up Visit   Date of visit: 5/22/2024  Physician: JUDSON Grace MD, MD  Primary Care is Lyndon Velez.  Laila Powell   33 year old  female     Initial Weight: 143#  Today's Weight:   Wt Readings from Last 1 Encounters:   05/22/24 55.8 kg (123 lb)     Body mass index is 24.83 kg/m .           Assessment and Plan   Assessment: Laila is a 33 year old year old female who presents for medical weight management.      Plan:    1. Hyperphagia  Patient was congratulated on her success thus far. Healthy habits to assist with further weight loss were discussed. She will try to add some muscle building activity. She will continue the wegovy..     2. Polycystic ovaries  This may improve with healthy habits and weight loss.     3. Gastroesophageal reflux disease, unspecified whether esophagitis present  This may improve with healthy habits and weight loss.      Follow up in 3 months with myself           INTERIM HISTORY  Patient is taking wegovy for appetite and craving control and she thinks it is helping. She does get headaches from the injections.    DIETARY HISTORY  Meals Per Day: 3  Eating Protein First?: usually  Food Diary: B:egg and toast and ham L:stir siddiqui with vegetables or cold sandwich D:similar to lunch  Typical Snack: not discussed  Portion Control: yes    Positive Changes Since Last Visit: portion control, decreased snacking  Struggling With: muscle loss, no muscle building activity    Knowledgeable in Reading Food Labels: yes  Getting Adequate Protein: sometimes      PHYSICAL ACTIVITY PATTERNS:  Walking or jogging 3-4 days per weekcx    REVIEW OF SYSTEMS  GENERAL/CONSTITUTIONAL:  Fatigue: sometimes  HEENT:   glaucoma: no  CARDIOVASCULAR:  History of heart disease: no  GI:  Pancreatitis: no  PSYCHIATRIC:  Moods: stable  MUSCULOSKELETAL/RHEUMATOLOGIC  Arthralgias: no  Myalgias: no  ENDOCRINE:  Monitoring Blood Sugars: no  Sugars Well Controlled: na  No personal or family history  "of medullary thyroid cancer no       Patient Profile   Social History     Social History Narrative    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         Past Medical History   Past Medical History:   Diagnosis Date    Acute cystitis     Anemia 11/4/2013    Anemia     Candidiasis of vulva and vagina     Disease of thyroid gland     Dizziness     Headache     Hypothyroidism 11/4/2013    Hypothyroidism     Microscopic hematuria     Pharyngitis     Reflux gastritis     UTI (urinary tract infection)      Patient Active Problem List   Diagnosis    Health Care Home    Microscopic hematuria    Hypothyroidism    Gastroesophageal reflux disease, esophagitis presence not specified    Vitamin D deficiency    Class 1 obesity due to excess calories without serious comorbidity with body mass index (BMI) of 31.0 to 31.9 in adult    Polycystic ovaries    Female infertility associated with anovulation    Autoimmune thyroiditis    Missed period    Nutritional counseling       Past Surgical History  She has a past surgical history that includes Cholecystectomy (2013); ------------other------------- (10/06/2020); IR Abscess Drain Insertion (3/1/2021); IR Abscess Tube Change (3/9/2021); IR Abscess Drain Insertion (3/31/2021); Cholecystectomy; c/section, low transverse; Combined Augmentation Mammaplasty And Abdominoplasty; other surgical history; Ir Abscess Drain Insertion (3/1/2021); IR Abscess Drain Exchange (3/9/2021); and Ir Abscess Drain Insertion (3/31/2021).     Examination   Ht 1.499 m (4' 11.02\")   Wt 55.8 kg (123 lb)   BMI 24.83 kg/m    Wt Readings from Last 4 Encounters:   05/22/24 55.8 kg (123 lb)   01/29/24 55.8 kg (123 lb)   10/18/23 57.2 kg (126 lb)   07/17/23 59.9 kg (132 lb)      BP Readings from Last 3 Encounters:   07/30/21 112/78   06/02/21 120/81   05/24/21 130/80    GENERAL: alert and no distress  EYES: Eyes grossly normal to inspection.  No discharge or " erythema, or obvious scleral/conjunctival abnormalities.  RESP: No audible wheeze, cough, or visible cyanosis.    SKIN: Visible skin clear. No significant rash, abnormal pigmentation or lesions.  NEURO: Cranial nerves grossly intact.  Mentation and speech appropriate for age.  PSYCH: Appropriate affect, tone, and pace of words        Counseling:   We reviewed the important post op bariatric recommendations:  -eating 3 meals daily  -eating protein first, getting >60gm protein daily  -eating slowly, chewing food well  -avoiding/limiting calorie containing beverages  -limiting starchy vegetables and carbohydrates, choosing wheat, not white with breads,   crackers, pastas, barbara, bagels, tortillas, rice  -limiting restaurant or cafeteria eating to twice a week or less    We discussed the importance of restorative sleep and stress management in maintaining a healthy weight.  We discussed the National Weight Control Registry healthy weight maintenance strategies and ways to optimize metabolism.  We discussed the importance of physical activity including cardiovascular and strength training in maintaining a healthier weight.    Total time spent on the date of this encounter doing: chart review, review of test results, patient visit, physical exam, education, counseling, developing plan of care and documenting = 20 minutes.         JUDSON Grace MD  [facility]

## 2024-05-22 ENCOUNTER — VIRTUAL VISIT (OUTPATIENT)
Dept: SURGERY | Facility: CLINIC | Age: 34
End: 2024-05-22
Payer: COMMERCIAL

## 2024-05-22 VITALS — HEIGHT: 59 IN | BODY MASS INDEX: 24.8 KG/M2 | WEIGHT: 123 LBS

## 2024-05-22 DIAGNOSIS — K21.9 GASTROESOPHAGEAL REFLUX DISEASE, UNSPECIFIED WHETHER ESOPHAGITIS PRESENT: ICD-10-CM

## 2024-05-22 DIAGNOSIS — R63.2 HYPERPHAGIA: Primary | ICD-10-CM

## 2024-05-22 DIAGNOSIS — E28.2 POLYCYSTIC OVARIES: ICD-10-CM

## 2024-05-22 PROCEDURE — 99213 OFFICE O/P EST LOW 20 MIN: CPT | Mod: 93 | Performed by: FAMILY MEDICINE

## 2024-05-22 ASSESSMENT — PAIN SCALES - GENERAL: PAINLEVEL: NO PAIN (0)

## 2024-05-22 NOTE — NURSING NOTE
Is the patient currently in the state of [place]? YES    Visit mode:TELEPHONE    If the visit is dropped, the patient can be reconnected by: TELEPHONE VISIT: Phone number: [phone]    Will anyone else be joining the visit? NO  (If patient encounters technical issues they should call [phone]    How would you like to obtain your AVS? MyChart    Are changes needed to the allergy or medication list? Pt stated no changes to allergies and Pt stated no med changes    Reason for visit: Follow Up    Terese VALLEJO

## 2024-05-22 NOTE — LETTER
5/22/2024         RE: Laila Powell  [address]  [place] [place] [address]        Dear Colleague,    Thank you for referring your patient, Laila Powell, to the [facility] [place]. Please see a copy of my visit note below.    Bariatric Care Clinic Non Surgical Follow up Visit   Date of visit: 5/22/2024  Physician: JUDSON Grace MD, MD  Primary Care is Lyndon Velez.  Laila Powell   33 year old  female     Initial Weight: 143#  Today's Weight:   Wt Readings from Last 1 Encounters:   05/22/24 55.8 kg (123 lb)     Body mass index is 24.83 kg/m .           Assessment and Plan   Assessment: Laila is a 33 year old year old female who presents for medical weight management.      Plan:    1. Hyperphagia  Patient was congratulated on her success thus far. Healthy habits to assist with further weight loss were discussed. She will try to add some muscle building activity. She will continue the wegovy..     2. Polycystic ovaries  This may improve with healthy habits and weight loss.     3. Gastroesophageal reflux disease, unspecified whether esophagitis present  This may improve with healthy habits and weight loss.      Follow up in 3 months with myself           INTERIM HISTORY  Patient is taking wegovy for appetite and craving control and she thinks it is helping. She does get headaches from the injections.    DIETARY HISTORY  Meals Per Day: 3  Eating Protein First?: usually  Food Diary: B:egg and toast and ham L:stir siddiqui with vegetables or cold sandwich D:similar to lunch  Typical Snack: not discussed  Portion Control: yes    Positive Changes Since Last Visit: portion control, decreased snacking  Struggling With: muscle loss, no muscle building activity    Knowledgeable in Reading Food Labels: yes  Getting Adequate Protein: sometimes      PHYSICAL ACTIVITY PATTERNS:  Walking or jogging 3-4 days per weekcx    REVIEW OF SYSTEMS  GENERAL/CONSTITUTIONAL:  Fatigue: sometimes  HEENT:    "glaucoma: no  CARDIOVASCULAR:  History of heart disease: no  GI:  Pancreatitis: no  PSYCHIATRIC:  Moods: stable  MUSCULOSKELETAL/RHEUMATOLOGIC  Arthralgias: no  Myalgias: no  ENDOCRINE:  Monitoring Blood Sugars: no  Sugars Well Controlled: na  No personal or family history of medullary thyroid cancer no       Patient Profile   Social History     Social History Narrative    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         Past Medical History   Past Medical History:   Diagnosis Date     Acute cystitis      Anemia 11/4/2013     Anemia      Candidiasis of vulva and vagina      Disease of thyroid gland      Dizziness      Headache      Hypothyroidism 11/4/2013     Hypothyroidism      Microscopic hematuria      Pharyngitis      Reflux gastritis      UTI (urinary tract infection)      Patient Active Problem List   Diagnosis     Health Care Home     Microscopic hematuria     Hypothyroidism     Gastroesophageal reflux disease, esophagitis presence not specified     Vitamin D deficiency     Class 1 obesity due to excess calories without serious comorbidity with body mass index (BMI) of 31.0 to 31.9 in adult     Polycystic ovaries     Female infertility associated with anovulation     Autoimmune thyroiditis     Missed period     Nutritional counseling       Past Surgical History  She has a past surgical history that includes Cholecystectomy (2013); ------------other------------- (10/06/2020); IR Abscess Drain Insertion (3/1/2021); IR Abscess Tube Change (3/9/2021); IR Abscess Drain Insertion (3/31/2021); Cholecystectomy; c/section, low transverse; Combined Augmentation Mammaplasty And Abdominoplasty; other surgical history; Ir Abscess Drain Insertion (3/1/2021); IR Abscess Drain Exchange (3/9/2021); and Ir Abscess Drain Insertion (3/31/2021).     Examination   Ht 1.499 m (4' 11.02\")   Wt 55.8 kg (123 lb)   BMI 24.83 kg/m    Wt Readings from Last 4 Encounters: "   05/22/24 55.8 kg (123 lb)   01/29/24 55.8 kg (123 lb)   10/18/23 57.2 kg (126 lb)   07/17/23 59.9 kg (132 lb)      BP Readings from Last 3 Encounters:   07/30/21 112/78   06/02/21 120/81   05/24/21 130/80    GENERAL: alert and no distress  EYES: Eyes grossly normal to inspection.  No discharge or erythema, or obvious scleral/conjunctival abnormalities.  RESP: No audible wheeze, cough, or visible cyanosis.    SKIN: Visible skin clear. No significant rash, abnormal pigmentation or lesions.  NEURO: Cranial nerves grossly intact.  Mentation and speech appropriate for age.  PSYCH: Appropriate affect, tone, and pace of words        Counseling:   We reviewed the important post op bariatric recommendations:  -eating 3 meals daily  -eating protein first, getting >60gm protein daily  -eating slowly, chewing food well  -avoiding/limiting calorie containing beverages  -limiting starchy vegetables and carbohydrates, choosing wheat, not white with breads,   crackers, pastas, barbara, bagels, tortillas, rice  -limiting restaurant or cafeteria eating to twice a week or less    We discussed the importance of restorative sleep and stress management in maintaining a healthy weight.  We discussed the National Weight Control Registry healthy weight maintenance strategies and ways to optimize metabolism.  We discussed the importance of physical activity including cardiovascular and strength training in maintaining a healthier weight.    Total time spent on the date of this encounter doing: chart review, review of test results, patient visit, physical exam, education, counseling, developing plan of care and documenting = 20 minutes.         JUDSON Grace MD  [facility]           Virtual Visit Details    Type of service:  Telephone Visit   Phone call duration: 9 minutes   Originating Location (pt. Location): Home    Distant Location (provider location):  Off-site        Again, thank you for allowing me to  participate in the care of your patient.        Sincerely,        JUDSON Grace MD

## 2024-05-22 NOTE — PROGRESS NOTES
Virtual Visit Details    Type of service:  Telephone Visit   Phone call duration: 9 minutes   Originating Location (pt. Location): Home    Distant Location (provider location):  Off-site

## 2024-06-09 ENCOUNTER — HEALTH MAINTENANCE LETTER (OUTPATIENT)
Age: 34
End: 2024-06-09

## 2025-02-28 ENCOUNTER — TELEPHONE (OUTPATIENT)
Dept: SURGERY | Facility: CLINIC | Age: 35
End: 2025-02-28

## 2025-02-28 NOTE — TELEPHONE ENCOUNTER
Prior Authorization Retail Medication Request    Medication/Dose: Wegovy 0.25mg/0.5ml Auto-injectors.   New/renewal/insurance change PA/secondary ins. PA:  Previously Tried and Failed:  Was previously taking but had to stop for awhile. Now starting up again.     Key: [id number]    Pharmacy Information (if different than what is on RX)  Name:  [id number] [place], [place]   Phone:  [phone]   Fax:  [phone]     Clinic Information  Preferred routing pool for dept communication: Bariatric Surgery Support Pool [place]

## 2025-03-05 NOTE — TELEPHONE ENCOUNTER
Retail Pharmacy Prior Authorization Team   Phone: [phone]    PA Initiation    Medication: WEGOVY 0.25 MG/0.5ML SC SOAJ  Insurance Company: [employer] - Phone [phone] Fax [phone]  Pharmacy Filling the Rx: [facility] #[id number] - [place], [place] - [address] AT [facility] [address]  Filling Pharmacy Phone: [phone]  Filling Pharmacy Fax:    Start Date: 3/5/2025    Note: Due to record-high volumes, our turn-around time is taking longer than usual . We are currently 4  business days behind in the pools.   We are working diligently to submit all requests in a timely manner and in the order they are received. Please only flag TRUE URGENT requests as high priority to the pool at this time.   If you have questions on status of PA's,  please send a note/message in the active PA encounter and send back to the [facility] PA pool [[id number]].    If you have questions about the turn-around time or about our process, please reach out to our supervisor Sherri Lima.   Thank you!   RPPA (Retail Pharmacy Prior Authorization) team    [id number]

## 2025-03-06 NOTE — TELEPHONE ENCOUNTER
PRIOR AUTHORIZATION DENIED    Medication: WEGOVY 0.25 MG/0.5ML SC SOAJ  Insurance Company: [employer] - Phone [phone] Fax [phone]  Denial Date: 3/6/2025  Denial Reason(s):             Appeal Information:         Patient Notified: No

## 2025-04-01 ENCOUNTER — MYC MEDICAL ADVICE (OUTPATIENT)
Dept: SURGERY | Facility: CLINIC | Age: 35
End: 2025-04-01
Payer: MEDICAID

## 2025-04-01 DIAGNOSIS — R63.2 HYPERPHAGIA: Primary | ICD-10-CM

## 2025-04-30 ENCOUNTER — TELEPHONE (OUTPATIENT)
Dept: SURGERY | Facility: CLINIC | Age: 35
End: 2025-04-30
Payer: MEDICAID

## 2025-04-30 ENCOUNTER — MYC MEDICAL ADVICE (OUTPATIENT)
Dept: SURGERY | Facility: CLINIC | Age: 35
End: 2025-04-30
Payer: MEDICAID

## 2025-04-30 NOTE — TELEPHONE ENCOUNTER
Called pharmacy and was informed that pt's Wegovy needs a PA even though she was able to get the first 2 months without needing one. Will get that started asap.     Martha Jacinto RN, CBN  [facility]  P [phone]  F [phone]

## 2025-04-30 NOTE — TELEPHONE ENCOUNTER
PA Initiation    Medication: WEGOVY 1 MG/0.5ML SC SOAJ  Insurance Company: [employer] - Phone [phone] Fax [phone]  Pharmacy Filling the Rx: [id number] - [place] - [address] AT [facility] [address]  Filling Pharmacy Phone: [phone]  Filling Pharmacy Fax: [phone]  Start Date: 4/30/2025

## 2025-04-30 NOTE — TELEPHONE ENCOUNTER
Prior Authorization Retail Medication Request    Medication/Dose: Wegovy 1mg/0.5ml Auto-injectors.   New/renewal/insurance change PA/secondary ins. PA:  Previously Tried and Failed:  Metformin-made her sleepy, Saxenda  Rationale:  Pt has been taking this medication for 2 months and is ready to move up to the next dosage, pharmacy is saying that a PA is needed.     Insurance   Primary: [facility]   Insurance ID:  [id number]    Key: [id number]    Pharmacy Information (if different than what is on RX)  Name:  [facility] #[id number] [place], [place]   Phone:  [phone]   Fax:  [phone]    Clinic Information  Preferred routing pool for dept communication: Bariatric Surgery Support Pool East

## 2025-05-01 NOTE — TELEPHONE ENCOUNTER
PRIOR AUTHORIZATION DENIED    Medication: WEGOVY 1 MG/0.5ML SC SOAJ  Insurance Company: [employer] - Phone [phone] Fax [phone]  Denial Date: 4/30/2025  Denial Reason(s):     Appeal Information: NA  Patient Notified: NO

## 2025-05-01 NOTE — TELEPHONE ENCOUNTER
MEDICATION APPEAL APPROVED    Medication: WEGOVY 1 MG/0.5ML SC SOAJ  Authorization Effective Date: 3/1/2025  Authorization Expiration Date: 5/1/2026  Reference #: [id number]   Appeal Insurance Company: [employer]  Filling Pharmacy: [facility] #[id number] - [place], [place] - [address] AT [facility] [address]  Patient Notified: NO    Comments:

## 2025-05-13 DIAGNOSIS — E66.811 CLASS 1 OBESITY DUE TO EXCESS CALORIES WITHOUT SERIOUS COMORBIDITY WITH BODY MASS INDEX (BMI) OF 31.0 TO 31.9 IN ADULT: ICD-10-CM

## 2025-05-13 DIAGNOSIS — E66.09 CLASS 1 OBESITY DUE TO EXCESS CALORIES WITHOUT SERIOUS COMORBIDITY WITH BODY MASS INDEX (BMI) OF 31.0 TO 31.9 IN ADULT: ICD-10-CM

## 2025-06-15 ENCOUNTER — HEALTH MAINTENANCE LETTER (OUTPATIENT)
Age: 35
End: 2025-06-15

## 2025-07-22 NOTE — PROGRESS NOTES
Virtual Visit Details    Type of service:  Video Visit   Video Start Time: 2:17 PM  Video End Time:2:27 PM    Originating Location (pt. Location): Home    Distant Location (provider location):  Off-site  Platform used for Video Visit: [facility]    Bariatric Care Clinic Non Surgical Follow up Visit   Date of visit: 7/23/25  Physician: JUDSON Grace MD, MD  Primary Care is Lyndon Velez Mai   34 year old  female     Initial Weight: 143#    Today's Weight:   Wt Readings from Last 1 Encounters:   07/23/25 61.2 kg (135 lb)     Body mass index is 27.25 kg/m .           Assessment and Plan   Assessment: Laila is a 34 year old year old female who presents for medical weight management.      Plan:    1. Overweight (BMI 25.0-29.9) (Primary)  Patient was congratulated on her success thus far. Healthy habits to assist with further weight loss were discussed. She will  start drinking a protein shake for breakfast and elis try to cut back on baked goods and starches. She will add some muscle building activity. She will continue the wegovy. We discussed the patient's co-morbid conditions including PCOS and GERD. These likely will improve with healthy habits and weight loss.     2. Polycystic ovaries  This may improve with healthy habits and weight loss.     3. Gastroesophageal reflux disease, unspecified whether esophagitis present  This may improve with healthy habits and weight loss.      Follow up next available with myself            INTERIM HISTORY  Patient is taking wegovy 2.4 mg weekly for appetite and craving control and she now thinks it is helping to control her appetite. She didn't notice effects until she got to the 2.4 mg dose.     DIETARY HISTORY  Meals Per Day: 2-3  Eating Protein First?: sometimes  Food Diary: B:granola bar and cereal and milk and muffins, sometimes yogurt and fruit L:vegetables and meat or fish,or sandwich D:similar to lunch  Snacks Per Day: occasional  Typical Snack: granola  bar,  fruit  Fluid Intake: water 64  Portion Control: improved  Calorie Containing Beverages:occasional juice  Meals at Restaurant per week:1-2    Positive Changes Since Last Visit: decreased fast food, portion control  Struggling With: protein in the mornings, muscle building exercise    Knowledgeable in Reading Food Labels: not sure  Getting Adequate Protein: sometimes    PHYSICAL ACTIVITY PATTERNS:  Gong to the park, walking    REVIEW OF SYSTEMS  GENERAL/CONSTITUTIONAL:  Fatigue: sometimes  HEENT:   glaucoma: no  CARDIOVASCULAR:  History of heart disease: no  GI:  Pancreatitis: no  NEUROLOGIC:  Paresthesias: no  History of migraine headaches: history of  PSYCHIATRIC:  Moods: stable  ENDOCRINE:  Monitoring Blood Sugars: no  Sugars Well Controlled: na  No personal or family history of medullary thyroid cancer no  No personal or family history of MEN2   :  Birth control: patches  History of kidney stones: Not discussed      Patient Profile   Social History     Social History Narrative    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         Past Medical History   Past Medical History:   Diagnosis Date    Acute cystitis     Anemia 11/4/2013    Anemia     Candidiasis of vulva and vagina     Disease of thyroid gland     Dizziness     Headache     Hypothyroidism 11/4/2013    Hypothyroidism     Microscopic hematuria     Pharyngitis     Reflux gastritis     UTI (urinary tract infection)      Patient Active Problem List   Diagnosis    Microscopic hematuria    Hypothyroidism    Gastroesophageal reflux disease, esophagitis presence not specified    Vitamin D deficiency    Class 1 obesity due to excess calories without serious comorbidity with body mass index (BMI) of 31.0 to 31.9 in adult    Polycystic ovaries    Female infertility associated with anovulation    Autoimmune thyroiditis    Missed period    Nutritional counseling       Past Surgical History  She has a past  "surgical history that includes Cholecystectomy (2013); ------------other------------- (10/06/2020); IR Abscess Drain Insertion (3/1/2021); IR Abscess Tube Change (3/9/2021); IR Abscess Drain Insertion (3/31/2021); Cholecystectomy; c/section, low transverse; Combined Augmentation Mammaplasty And Abdominoplasty; other surgical history; Ir Abscess Drain Insertion (3/1/2021); IR Abscess Drain Exchange (3/9/2021); and Ir Abscess Drain Insertion (3/31/2021).     Examination   Ht 1.499 m (4' 11.02\")   Wt 61.2 kg (135 lb)   BMI 27.25 kg/m    Wt Readings from Last 4 Encounters:   07/23/25 61.2 kg (135 lb)   02/28/25 59.4 kg (131 lb)   05/22/24 55.8 kg (123 lb)   01/29/24 55.8 kg (123 lb)      BP Readings from Last 3 Encounters:   07/30/21 112/78   06/02/21 120/81   05/24/21 130/80      GENERAL: alert and no distress  EYES: Eyes grossly normal to inspection.  No discharge or erythema, or obvious scleral/conjunctival abnormalities.  RESP: No audible wheeze, cough, or visible cyanosis.    SKIN: Visible skin clear. No significant rash, abnormal pigmentation or lesions.  NEURO: Cranial nerves grossly intact.  Mentation and speech appropriate for age.  PSYCH: Appropriate affect, tone, and pace of words        Counseling:   We reviewed the important post op bariatric recommendations:  -eating 3 meals daily  -eating protein first, getting >60gm protein daily  -eating slowly, chewing food well  -avoiding/limiting calorie containing beverages  -limiting starchy vegetables and carbohydrates, choosing wheat, not white with breads,   crackers, pastas, barbara, bagels, tortillas, rice  -limiting restaurant or cafeteria eating to twice a week or less    We discussed the importance of restorative sleep and stress management in maintaining a healthy weight.  We discussed the National Weight Control Registry healthy weight maintenance strategies and ways to optimize metabolism.  We discussed the importance of physical activity including " cardiovascular and strength training in maintaining a healthier weight.    Total time spent on the date of this encounter doing: chart review, review of test results, patient visit, physical exam, education, counseling, developing plan of care and documenting = 24 minutes.         JUDSON Grace MD  [facility]

## 2025-07-23 ENCOUNTER — VIRTUAL VISIT (OUTPATIENT)
Dept: SURGERY | Facility: CLINIC | Age: 35
End: 2025-07-23
Payer: COMMERCIAL

## 2025-07-23 VITALS — HEIGHT: 59 IN | BODY MASS INDEX: 27.21 KG/M2 | WEIGHT: 135 LBS

## 2025-07-23 DIAGNOSIS — E66.09 CLASS 1 OBESITY DUE TO EXCESS CALORIES WITHOUT SERIOUS COMORBIDITY WITH BODY MASS INDEX (BMI) OF 31.0 TO 31.9 IN ADULT: ICD-10-CM

## 2025-07-23 DIAGNOSIS — E66.811 CLASS 1 OBESITY DUE TO EXCESS CALORIES WITHOUT SERIOUS COMORBIDITY WITH BODY MASS INDEX (BMI) OF 31.0 TO 31.9 IN ADULT: ICD-10-CM

## 2025-07-23 PROCEDURE — 98005 SYNCH AUDIO-VIDEO EST LOW 20: CPT | Performed by: FAMILY MEDICINE

## 2025-07-23 ASSESSMENT — PAIN SCALES - GENERAL: PAINLEVEL_OUTOF10: NO PAIN (0)

## 2025-07-23 NOTE — PATIENT INSTRUCTIONS

## 2025-07-23 NOTE — LETTER
7/23/2025      Laila Powell  [address] [place] [address]      Dear Colleague,    Thank you for referring your patient, Laila Powell, to the [facility] [place]. Please see a copy of my visit note below.    Virtual Visit Details    Type of service:  Video Visit   Video Start Time: 2:17 PM  Video End Time:2:27 PM    Originating Location (pt. Location): Home    Distant Location (provider location):  Off-site  Platform used for Video Visit: [facility]    Bariatric Care Clinic Non Surgical Follow up Visit   Date of visit: 7/23/25  Physician: JUDSON Grace MD, MD  Primary Care is Lyndon Velez  Laila Powell   34 year old  female     Initial Weight: 143#    Today's Weight:   Wt Readings from Last 1 Encounters:   07/23/25 61.2 kg (135 lb)     Body mass index is 27.25 kg/m .           Assessment and Plan   Assessment: Laila is a 34 year old year old female who presents for medical weight management.      Plan:    1. Overweight (BMI 25.0-29.9) (Primary)  Patient was congratulated on her success thus far. Healthy habits to assist with further weight loss were discussed. She will  start drinking a protein shake for breakfast and elis try to cut back on baked goods and starches. She will add some muscle building activity. She will continue the wegovy. We discussed the patient's co-morbid conditions including PCOS and GERD. These likely will improve with healthy habits and weight loss.     2. Polycystic ovaries  This may improve with healthy habits and weight loss.     3. Gastroesophageal reflux disease, unspecified whether esophagitis present  This may improve with healthy habits and weight loss.      Follow up next available with myself            INTERIM HISTORY  Patient is taking wegovy 2.4 mg weekly for appetite and craving control and she now thinks it is helping to control her appetite. She didn't notice effects until she got to the 2.4 mg dose.     DIETARY HISTORY  Meals Per  Day: 2-3  Eating Protein First?: sometimes  Food Diary: B:granola bar and cereal and milk and muffins, sometimes yogurt and fruit L:vegetables and meat or fish,or sandwich D:similar to lunch  Snacks Per Day: occasional  Typical Snack: granola  bar, fruit  Fluid Intake: water 64  Portion Control: improved  Calorie Containing Beverages:occasional juice  Meals at Restaurant per week:1-2    Positive Changes Since Last Visit: decreased fast food, portion control  Struggling With: protein in the mornings, muscle building exercise    Knowledgeable in Reading Food Labels: not sure  Getting Adequate Protein: sometimes    PHYSICAL ACTIVITY PATTERNS:  Gong to the park, walking    REVIEW OF SYSTEMS  GENERAL/CONSTITUTIONAL:  Fatigue: sometimes  HEENT:   glaucoma: no  CARDIOVASCULAR:  History of heart disease: no  GI:  Pancreatitis: no  NEUROLOGIC:  Paresthesias: no  History of migraine headaches: history of  PSYCHIATRIC:  Moods: stable  ENDOCRINE:  Monitoring Blood Sugars: no  Sugars Well Controlled: na  No personal or family history of medullary thyroid cancer no  No personal or family history of MEN2   :  Birth control: patches  History of kidney stones: Not discussed      Patient Profile   Social History     Social History Narrative    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         Past Medical History   Past Medical History:   Diagnosis Date     Acute cystitis      Anemia 11/4/2013     Anemia      Candidiasis of vulva and vagina      Disease of thyroid gland      Dizziness      Headache      Hypothyroidism 11/4/2013     Hypothyroidism      Microscopic hematuria      Pharyngitis      Reflux gastritis      UTI (urinary tract infection)      Patient Active Problem List   Diagnosis     Microscopic hematuria     Hypothyroidism     Gastroesophageal reflux disease, esophagitis presence not specified     Vitamin D deficiency     Class 1 obesity due to excess calories  "without serious comorbidity with body mass index (BMI) of 31.0 to 31.9 in adult     Polycystic ovaries     Female infertility associated with anovulation     Autoimmune thyroiditis     Missed period     Nutritional counseling       Past Surgical History  She has a past surgical history that includes Cholecystectomy (2013); ------------other------------- (10/06/2020); IR Abscess Drain Insertion (3/1/2021); IR Abscess Tube Change (3/9/2021); IR Abscess Drain Insertion (3/31/2021); Cholecystectomy; c/section, low transverse; Combined Augmentation Mammaplasty And Abdominoplasty; other surgical history; Ir Abscess Drain Insertion (3/1/2021); IR Abscess Drain Exchange (3/9/2021); and Ir Abscess Drain Insertion (3/31/2021).     Examination   Ht 1.499 m (4' 11.02\")   Wt 61.2 kg (135 lb)   BMI 27.25 kg/m    Wt Readings from Last 4 Encounters:   07/23/25 61.2 kg (135 lb)   02/28/25 59.4 kg (131 lb)   05/22/24 55.8 kg (123 lb)   01/29/24 55.8 kg (123 lb)      BP Readings from Last 3 Encounters:   07/30/21 112/78   06/02/21 120/81   05/24/21 130/80      GENERAL: alert and no distress  EYES: Eyes grossly normal to inspection.  No discharge or erythema, or obvious scleral/conjunctival abnormalities.  RESP: No audible wheeze, cough, or visible cyanosis.    SKIN: Visible skin clear. No significant rash, abnormal pigmentation or lesions.  NEURO: Cranial nerves grossly intact.  Mentation and speech appropriate for age.  PSYCH: Appropriate affect, tone, and pace of words        Counseling:   We reviewed the important post op bariatric recommendations:  -eating 3 meals daily  -eating protein first, getting >60gm protein daily  -eating slowly, chewing food well  -avoiding/limiting calorie containing beverages  -limiting starchy vegetables and carbohydrates, choosing wheat, not white with breads,   crackers, pastas, barbara, bagels, tortillas, rice  -limiting restaurant or cafeteria eating to twice a week or less    We discussed the " importance of restorative sleep and stress management in maintaining a healthy weight.  We discussed the National Weight Control Registry healthy weight maintenance strategies and ways to optimize metabolism.  We discussed the importance of physical activity including cardiovascular and strength training in maintaining a healthier weight.    Total time spent on the date of this encounter doing: chart review, review of test results, patient visit, physical exam, education, counseling, developing plan of care and documenting = 24 minutes.         JUDSON Grace MD  [facility]             Again, thank you for allowing me to participate in the care of your patient.        Sincerely,        JUDSON Grace MD    Electronically signed

## 2025-07-23 NOTE — NURSING NOTE
Is the patient currently in the state of [place]? yes    Location: home    Visit mode:VIDEO    If the visit is dropped, the patient can be reconnected by: VIDEO VISIT: Text to cell phone:   Telephone Information:   Mobile [phone]       Will anyone else be joining the visit? NO  (If patient encounters technical issues they should call [phone]    Are changes needed to the allergy or medication list? No    Are refills needed on medications prescribed by this physician? NO    Reason for visit: IDA Cantu, Virtual Visit Facilitator    QNR Status: completed